# Patient Record
Sex: MALE | Race: BLACK OR AFRICAN AMERICAN | NOT HISPANIC OR LATINO | Employment: FULL TIME | URBAN - METROPOLITAN AREA
[De-identification: names, ages, dates, MRNs, and addresses within clinical notes are randomized per-mention and may not be internally consistent; named-entity substitution may affect disease eponyms.]

---

## 2017-01-31 DIAGNOSIS — M54.2 NECK PAIN: ICD-10-CM

## 2017-01-31 RX ORDER — METHYLPREDNISOLONE 4 MG/1
TABLET ORAL
Qty: 1 EACH | Refills: 0 | Status: SHIPPED | OUTPATIENT
Start: 2017-01-31 | End: 2017-02-10

## 2017-01-31 NOTE — TELEPHONE ENCOUNTER
Patient called, he said for the past 5 days he has been waking up with the right side of his neck swollen and right shoulder and arm aching.   He has been using ice which does help bring the swelling down.      He request a MDP through his pharmacy.  Can we refill this for him?  His last one was early December along with x rays that we ordered.  Also, should we order any new scans for him?

## 2017-02-10 ENCOUNTER — OFFICE VISIT (OUTPATIENT)
Dept: NEUROSURGERY | Facility: CLINIC | Age: 47
End: 2017-02-10

## 2017-02-10 VITALS
WEIGHT: 175 LBS | SYSTOLIC BLOOD PRESSURE: 101 MMHG | HEIGHT: 70 IN | BODY MASS INDEX: 25.05 KG/M2 | DIASTOLIC BLOOD PRESSURE: 65 MMHG | HEART RATE: 85 BPM

## 2017-02-10 DIAGNOSIS — M50.31 DEGENERATION OF INTERVERTEBRAL DISC OF HIGH CERVICAL REGION: ICD-10-CM

## 2017-02-10 DIAGNOSIS — M54.2 NECK PAIN: Primary | ICD-10-CM

## 2017-02-10 PROCEDURE — 99213 OFFICE O/P EST LOW 20 MIN: CPT | Performed by: NEUROLOGICAL SURGERY

## 2017-02-10 NOTE — PROGRESS NOTES
Subjective   Patient ID: Dave Flynn is a 47 y.o. male is here today for follow-up after ACDF C4-C7 on 2/9/16.      Over the past few months, the patient has had increased neck pain and has been treated with MDP x2.  X-rays of the cervical spine were also ordered which were negative per Renetta.    Today, the patient notes that he continues to have pain in his neck and right shoulder and arm.    He notes that MDP really helped with his pain symptoms when he was taking the medication but his pain symptoms eventually returned and have been getting worse.    Neck Pain    This is a chronic problem. The current episode started more than 1 month ago. The problem occurs daily. The problem has been gradually worsening. Pertinent negatives include no fever.       The following portions of the patient's history were reviewed and updated as appropriate: allergies, current medications, past family history, past medical history, past social history, past surgical history and problem list.    Review of Systems   Constitutional: Negative for fever.   Musculoskeletal: Positive for neck pain.   Neurological: Negative for syncope.   All other systems reviewed and are negative.    It is been about a year since I did a ACDF at C4-C5, C5-C6, C6-C7.  He's been having more pain in the right side of his neck.  The right arm really doesn't bother him I could once did.  He had some questions about his surgery which I answered.  He still has a little bit of mild difficulty with his voice.  He doesn't feel he can speak as forcefully as before.  I still think there is time for that to get better.  He doesn't have any swallowing problems anymore.  He works with Dr. Mercado.  Most of his problems of the right side of the neck.  He has a job which does involve a lot of moving of the head and looking up that contributes to it.  He is a .  Dr. Mercado had talked about doing facet injections which I think are good idea.  And possibly a  radiofrequency ablation.  He will work with Dr. Mercado.  I stressed with him that we are dealing with a chronic lifelong condition that is certainly treatable but not curable.  I think he understands that better.  We'll see him in 6 months to see how his voice is doing.  Objective   Physical Exam   Constitutional: He is oriented to person, place, and time. He appears well-developed and well-nourished.   HENT:   Head: Normocephalic and atraumatic.   Eyes: Conjunctivae and EOM are normal. Pupils are equal, round, and reactive to light.   Fundoscopic exam:       The right eye shows no papilledema. The right eye shows venous pulsations.        The left eye shows no papilledema. The left eye shows venous pulsations.   Neck: Carotid bruit is not present.   Neurological: He is oriented to person, place, and time. He has a normal Finger-Nose-Finger Test and a normal Heel to Shin Test. Gait normal.   Reflex Scores:       Tricep reflexes are 2+ on the right side and 2+ on the left side.       Bicep reflexes are 2+ on the right side and 2+ on the left side.       Brachioradialis reflexes are 2+ on the right side and 2+ on the left side.       Patellar reflexes are 2+ on the right side and 2+ on the left side.       Achilles reflexes are 2+ on the right side and 2+ on the left side.  Psychiatric: His speech is normal.     Neurologic Exam     Mental Status   Oriented to person, place, and time.   Registration of memory: Good recent and remote memory.   Attention: normal. Concentration: normal.   Speech: speech is normal   Level of consciousness: alert  Knowledge: consistent with education.     Cranial Nerves     CN II   Visual fields full to confrontation.   Visual acuity: normal    CN III, IV, VI   Pupils are equal, round, and reactive to light.  Extraocular motions are normal.     CN V   Facial sensation intact.   Right corneal reflex: normal  Left corneal reflex: normal    CN VII   Facial expression full, symmetric.   Right  facial weakness: none  Left facial weakness: none    CN VIII   Hearing: intact    CN IX, X   Palate: symmetric    CN XI   Right sternocleidomastoid strength: normal  Left sternocleidomastoid strength: normal    CN XII   Tongue: not atrophic  Tongue deviation: none    Motor Exam   Muscle bulk: normal  Right arm tone: normal  Left arm tone: normal  Right leg tone: normal  Left leg tone: normal    Strength   Strength 5/5 except as noted.     Sensory Exam   Light touch normal.     Gait, Coordination, and Reflexes     Gait  Gait: normal    Coordination   Finger to nose coordination: normal  Heel to shin coordination: normal    Reflexes   Right brachioradialis: 2+  Left brachioradialis: 2+  Right biceps: 2+  Left biceps: 2+  Right triceps: 2+  Left triceps: 2+  Right patellar: 2+  Left patellar: 2+  Right achilles: 2+  Left achilles: 2+  Right : 2+  Left : 2+      Assessment/Plan   Independent Review of Radiographic Studies:    Plain x-rays done 12 systems 7/16 show good placement of the cages the plates and screws for 5 to C5 6 to C6-C7.  I agree with the report.      Medical Decision Making:    He will continue working with Dr. Mercado.  Facet injections are reasonable idea and possibly an ablation afterwards.  He will take tizanidine as needed.  We'll see him in 6 months manily to see how the voice is doing.  He doesn't feel it is returning to normal.      Dave was seen today for neck pain.    Diagnoses and all orders for this visit:    Neck pain    Degeneration of intervertebral disc of high cervical region    Return in about 6 months (around 8/10/2017).

## 2017-02-10 NOTE — PATIENT INSTRUCTIONS
Steps to Quit Smoking   Smoking tobacco can be harmful to your health and can affect almost every organ in your body. Smoking puts you, and those around you, at risk for developing many serious chronic diseases. Quitting smoking is difficult, but it is one of the best things that you can do for your health. It is never too late to quit.  WHAT ARE THE BENEFITS OF QUITTING SMOKING?  When you quit smoking, you lower your risk of developing serious diseases and conditions, such as:  · Lung cancer or lung disease, such as COPD.  · Heart disease.  · Stroke.  · Heart attack.  · Infertility.  · Osteoporosis and bone fractures.  Additionally, symptoms such as coughing, wheezing, and shortness of breath may get better when you quit. You may also find that you get sick less often because your body is stronger at fighting off colds and infections. If you are pregnant, quitting smoking can help to reduce your chances of having a baby of low birth weight.  HOW DO I GET READY TO QUIT?  When you decide to quit smoking, create a plan to make sure that you are successful. Before you quit:  · Pick a date to quit. Set a date within the next two weeks to give you time to prepare.  · Write down the reasons why you are quitting. Keep this list in places where you will see it often, such as on your bathroom mirror or in your car or wallet.  · Identify the people, places, things, and activities that make you want to smoke (triggers) and avoid them. Make sure to take these actions:    Throw away all cigarettes at home, at work, and in your car.    Throw away smoking accessories, such as ashtrays and lighters.    Clean your car and make sure to empty the ashtray.    Clean your home, including curtains and carpets.  · Tell your family, friends, and coworkers that you are quitting. Support from your loved ones can make quitting easier.  · Talk with your health care provider about your options for quitting smoking.  · Find out what treatment  "options are covered by your health insurance.  WHAT STRATEGIES CAN I USE TO QUIT SMOKING?   Talk with your healthcare provider about different strategies to quit smoking. Some strategies include:  · Quitting smoking altogether instead of gradually lessening how much you smoke over a period of time. Research shows that quitting \"cold turkey\" is more successful than gradually quitting.  · Attending in-person counseling to help you build problem-solving skills. You are more likely to have success in quitting if you attend several counseling sessions. Even short sessions of 10 minutes can be effective.  · Finding resources and support systems that can help you to quit smoking and remain smoke-free after you quit. These resources are most helpful when you use them often. They can include:    Online chats with a counselor.    Telephone quitlines.    Printed self-help materials.    Support groups or group counseling.    Text messaging programs.    Mobile phone applications.  · Taking medicines to help you quit smoking. (If you are pregnant or breastfeeding, talk with your health care provider first.) Some medicines contain nicotine and some do not. Both types of medicines help with cravings, but the medicines that include nicotine help to relieve withdrawal symptoms. Your health care provider may recommend:    Nicotine patches, gum, or lozenges.    Nicotine inhalers or sprays.    Non-nicotine medicine that is taken by mouth.  Talk with your health care provider about combining strategies, such as taking medicines while you are also receiving in-person counseling. Using these two strategies together makes you more likely to succeed in quitting than if you used either strategy on its own.  If you are pregnant or breastfeeding, talk with your health care provider about finding counseling or other support strategies to quit smoking. Do not take medicine to help you quit smoking unless told to do so by your health care " provider.  WHAT THINGS CAN I DO TO MAKE IT EASIER TO QUIT?  Quitting smoking might feel overwhelming at first, but there is a lot that you can do to make it easier. Take these important actions:  · Reach out to your family and friends and ask that they support and encourage you during this time. Call telephone quitlines, reach out to support groups, or work with a counselor for support.  · Ask people who smoke to avoid smoking around you.  · Avoid places that trigger you to smoke, such as bars, parties, or smoke-break areas at work.  · Spend time around people who do not smoke.  · Lessen stress in your life, because stress can be a smoking trigger for some people. To lessen stress, try:    Exercising regularly.    Deep-breathing exercises.    Yoga.    Meditating.    Performing a body scan. This involves closing your eyes, scanning your body from head to toe, and noticing which parts of your body are particularly tense. Purposefully relax the muscles in those areas.  · Download or purchase mobile phone or tablet apps (applications) that can help you stick to your quit plan by providing reminders, tips, and encouragement. There are many free apps, such as QuitGuide from the CDC (Centers for Disease Control and Prevention). You can find other support for quitting smoking (smoking cessation) through smokefree.gov and other websites.  HOW WILL I FEEL WHEN I QUIT SMOKING?  Within the first 24 hours of quitting smoking, you may start to feel some withdrawal symptoms. These symptoms are usually most noticeable 2-3 days after quitting, but they usually do not last beyond 2-3 weeks. Changes or symptoms that you might experience include:  · Mood swings.  · Restlessness, anxiety, or irritation.  · Difficulty concentrating.  · Dizziness.  · Strong cravings for sugary foods in addition to nicotine.  · Mild weight gain.  · Constipation.  · Nausea.  · Coughing or a sore throat.  · Changes in how your medicines work in your  body.  · A depressed mood.  · Difficulty sleeping (insomnia).  After the first 2-3 weeks of quitting, you may start to notice more positive results, such as:  · Improved sense of smell and taste.  · Decreased coughing and sore throat.  · Slower heart rate.  · Lower blood pressure.  · Clearer skin.  · The ability to breathe more easily.  · Fewer sick days.  Quitting smoking is very challenging for most people. Do not get discouraged if you are not successful the first time. Some people need to make many attempts to quit before they achieve long-term success. Do your best to stick to your quit plan, and talk with your health care provider if you have any questions or concerns.     This information is not intended to replace advice given to you by your health care provider. Make sure you discuss any questions you have with your health care provider.     Document Released: 12/12/2002 Document Revised: 05/03/2016 Document Reviewed: 05/03/2016  [employer] Interactive Patient Education ©2016 [employer]  Obesity  Obesity is defined as having too much total body fat and a body mass index (BMI) of 30 or more. BMI is an estimate of body fat and is calculated from your height and weight. BMI is typically calculated by your health care provider during regular wellness visits. Obesity happens when you consume more calories than you can burn by exercising or performing daily physical tasks. Prolonged obesity can cause major illnesses or emergencies, such as:  · Stroke.  · Heart disease.  · Diabetes.  · Cancer.  · Arthritis.  · High blood pressure (hypertension).  · High cholesterol.  · Sleep apnea.  · Erectile dysfunction.  · Infertility problems.  CAUSES   · Regularly eating unhealthy foods.  · Physical inactivity.  · Certain disorders, such as an underactive thyroid (hypothyroidism), Cushing's syndrome, and polycystic ovarian syndrome.  · Certain medicines, such as steroids, some depression medicines, and  antipsychotics.  · Genetics.  · Lack of sleep.  DIAGNOSIS  A health care provider can diagnose obesity after calculating your BMI. Obesity will be diagnosed if your BMI is 30 or higher.  There are other methods of measuring obesity levels. Some other methods include measuring your skinfold thickness, your waist circumference, and comparing your hip circumference to your waist circumference.  TREATMENT   A healthy treatment program includes some or all of the following:  · Long-term dietary changes.  · Exercise and physical activity.  · Behavioral and lifestyle changes.  · Medicine only under the supervision of your health care provider. Medicines may help, but only if they are used with diet and exercise programs.  If your BMI is 40 or higher, your health care provider may recommend specialized surgery or programs to help with weight loss.  An unhealthy treatment program includes:  · Fasting.  · Fad diets.  · Supplements and drugs.  These choices do not succeed in long-term weight control.  HOME CARE INSTRUCTIONS  · Exercise and perform physical activity as directed by your health care provider. To increase physical activity, try the following:    Use stairs instead of elevators.    Park farther away from store entrances.    Garden, bike, or walk instead of watching television or using the computer.  · Eat healthy, low-calorie foods and drinks on a regular basis. Eat more fruits and vegetables. Use low-calorie cookbooks or take healthy cooking classes.  · Limit fast food, sweets, and processed snack foods.  · Eat smaller portions.  · Keep a daily journal of everything you eat. There are many free websites to help you with this. It may be helpful to measure your foods so you can determine if you are eating the correct portion sizes.  · Avoid drinking alcohol. Drink more water and drinks without calories.  · Take vitamins and supplements only as recommended by your health care provider.  · Weight-loss support groups,  registered dietitians, counselors, and stress reduction education can also be very helpful.  SEEK IMMEDIATE MEDICAL CARE IF:  · You have chest pain or tightness.  · You have trouble breathing or feel short of breath.  · You have weakness or leg numbness.  · You feel confused or have trouble talking.  · You have sudden changes in your vision.     This information is not intended to replace advice given to you by your health care provider. Make sure you discuss any questions you have with your health care provider.     Document Released: 01/25/2006 Document Revised: 01/08/2016 Document Reviewed: 01/23/2013  [employer] Interactive Patient Education ©2016 [employer]

## 2017-03-13 ENCOUNTER — TRANSCRIBE ORDERS (OUTPATIENT)
Dept: ADMINISTRATIVE | Facility: HOSPITAL | Age: 47
End: 2017-03-13

## 2017-03-13 DIAGNOSIS — R10.9 BILATERAL FLANK PAIN: Primary | ICD-10-CM

## 2017-08-14 ENCOUNTER — OFFICE VISIT (OUTPATIENT)
Dept: NEUROSURGERY | Facility: CLINIC | Age: 47
End: 2017-08-14

## 2017-08-14 VITALS
HEIGHT: 70 IN | DIASTOLIC BLOOD PRESSURE: 79 MMHG | HEART RATE: 78 BPM | BODY MASS INDEX: 22.19 KG/M2 | SYSTOLIC BLOOD PRESSURE: 128 MMHG | WEIGHT: 155 LBS

## 2017-08-14 DIAGNOSIS — M50.31 DEGENERATION OF INTERVERTEBRAL DISC OF HIGH CERVICAL REGION: ICD-10-CM

## 2017-08-14 DIAGNOSIS — M54.2 NECK PAIN: Primary | ICD-10-CM

## 2017-08-14 DIAGNOSIS — R49.0 HOARSENESS OF VOICE: ICD-10-CM

## 2017-08-14 PROCEDURE — 99213 OFFICE O/P EST LOW 20 MIN: CPT | Performed by: NEUROLOGICAL SURGERY

## 2017-08-14 NOTE — PATIENT INSTRUCTIONS
Steps to Quit Smoking   Smoking tobacco can be harmful to your health and can affect almost every organ in your body. Smoking puts you, and those around you, at risk for developing many serious chronic diseases. Quitting smoking is difficult, but it is one of the best things that you can do for your health. It is never too late to quit.  WHAT ARE THE BENEFITS OF QUITTING SMOKING?  When you quit smoking, you lower your risk of developing serious diseases and conditions, such as:  · Lung cancer or lung disease, such as COPD.  · Heart disease.  · Stroke.  · Heart attack.  · Infertility.  · Osteoporosis and bone fractures.  Additionally, symptoms such as coughing, wheezing, and shortness of breath may get better when you quit. You may also find that you get sick less often because your body is stronger at fighting off colds and infections. If you are pregnant, quitting smoking can help to reduce your chances of having a baby of low birth weight.  HOW DO I GET READY TO QUIT?  When you decide to quit smoking, create a plan to make sure that you are successful. Before you quit:  · Pick a date to quit. Set a date within the next two weeks to give you time to prepare.  · Write down the reasons why you are quitting. Keep this list in places where you will see it often, such as on your bathroom mirror or in your car or wallet.  · Identify the people, places, things, and activities that make you want to smoke (triggers) and avoid them. Make sure to take these actions:    Throw away all cigarettes at home, at work, and in your car.    Throw away smoking accessories, such as ashtrays and lighters.    Clean your car and make sure to empty the ashtray.    Clean your home, including curtains and carpets.  · Tell your family, friends, and coworkers that you are quitting. Support from your loved ones can make quitting easier.  · Talk with your health care provider about your options for quitting smoking.  · Find out what treatment  "options are covered by your health insurance.  WHAT STRATEGIES CAN I USE TO QUIT SMOKING?   Talk with your healthcare provider about different strategies to quit smoking. Some strategies include:  · Quitting smoking altogether instead of gradually lessening how much you smoke over a period of time. Research shows that quitting \"cold turkey\" is more successful than gradually quitting.  · Attending in-person counseling to help you build problem-solving skills. You are more likely to have success in quitting if you attend several counseling sessions. Even short sessions of 10 minutes can be effective.  · Finding resources and support systems that can help you to quit smoking and remain smoke-free after you quit. These resources are most helpful when you use them often. They can include:    Online chats with a counselor.    Telephone quitlines.    Printed self-help materials.    Support groups or group counseling.    Text messaging programs.    Mobile phone applications.  · Taking medicines to help you quit smoking. (If you are pregnant or breastfeeding, talk with your health care provider first.) Some medicines contain nicotine and some do not. Both types of medicines help with cravings, but the medicines that include nicotine help to relieve withdrawal symptoms. Your health care provider may recommend:    Nicotine patches, gum, or lozenges.    Nicotine inhalers or sprays.    Non-nicotine medicine that is taken by mouth.  Talk with your health care provider about combining strategies, such as taking medicines while you are also receiving in-person counseling. Using these two strategies together makes you more likely to succeed in quitting than if you used either strategy on its own.  If you are pregnant or breastfeeding, talk with your health care provider about finding counseling or other support strategies to quit smoking. Do not take medicine to help you quit smoking unless told to do so by your health care " provider.  WHAT THINGS CAN I DO TO MAKE IT EASIER TO QUIT?  Quitting smoking might feel overwhelming at first, but there is a lot that you can do to make it easier. Take these important actions:  · Reach out to your family and friends and ask that they support and encourage you during this time. Call telephone quitlines, reach out to support groups, or work with a counselor for support.  · Ask people who smoke to avoid smoking around you.  · Avoid places that trigger you to smoke, such as bars, parties, or smoke-break areas at work.  · Spend time around people who do not smoke.  · Lessen stress in your life, because stress can be a smoking trigger for some people. To lessen stress, try:    Exercising regularly.    Deep-breathing exercises.    Yoga.    Meditating.    Performing a body scan. This involves closing your eyes, scanning your body from head to toe, and noticing which parts of your body are particularly tense. Purposefully relax the muscles in those areas.  · Download or purchase mobile phone or tablet apps (applications) that can help you stick to your quit plan by providing reminders, tips, and encouragement. There are many free apps, such as QuitGuide from the CDC (Centers for Disease Control and Prevention). You can find other support for quitting smoking (smoking cessation) through smokefree.gov and other websites.  HOW WILL I FEEL WHEN I QUIT SMOKING?  Within the first 24 hours of quitting smoking, you may start to feel some withdrawal symptoms. These symptoms are usually most noticeable 2-3 days after quitting, but they usually do not last beyond 2-3 weeks. Changes or symptoms that you might experience include:  · Mood swings.  · Restlessness, anxiety, or irritation.  · Difficulty concentrating.  · Dizziness.  · Strong cravings for sugary foods in addition to nicotine.  · Mild weight gain.  · Constipation.  · Nausea.  · Coughing or a sore throat.  · Changes in how your medicines work in your  body.  · A depressed mood.  · Difficulty sleeping (insomnia).  After the first 2-3 weeks of quitting, you may start to notice more positive results, such as:  · Improved sense of smell and taste.  · Decreased coughing and sore throat.  · Slower heart rate.  · Lower blood pressure.  · Clearer skin.  · The ability to breathe more easily.  · Fewer sick days.  Quitting smoking is very challenging for most people. Do not get discouraged if you are not successful the first time. Some people need to make many attempts to quit before they achieve long-term success. Do your best to stick to your quit plan, and talk with your health care provider if you have any questions or concerns.     This information is not intended to replace advice given to you by your health care provider. Make sure you discuss any questions you have with your health care provider.     Document Released: 12/12/2002 Document Revised: 05/03/2016 Document Reviewed: 05/03/2016  [employer] Interactive Patient Education ©2017 [employer]

## 2017-08-14 NOTE — PROGRESS NOTES
Subjective   Patient ID: Dave Flynn is a 47 y.o. male is here today for follow-up of neck pain.    At the patient's last visit, he was encouraged to continue working with Dr. Mercado.  He was encouraged to continue his Tizanidine.    Today, the patient notes that he has been treated with BASSAM and RFA of his cervical spine, which seemed to help for 3-4 months.  He notes that his pain symptoms have begun to return.  He notes that he is not currently scheduled for a followup with Dr. Mercado.     He notes that his right hand fingers are numb and the left hand fingers tingle.  He notes that his symptoms have began to increase since last week.      Neck Pain    This is a chronic problem. The current episode started more than 1 month ago. The problem occurs daily. The problem has been unchanged. Pertinent negatives include no fever.       The following portions of the patient's history were reviewed and updated as appropriate: allergies, current medications, past family history, past medical history, past social history, past surgical history and problem list.    Review of Systems   Constitutional: Negative for fever.   Musculoskeletal: Positive for neck pain.   Neurological: Negative for syncope.   All other systems reviewed and are negative.    It's been about 18 months since I did an ACDF at C4-C5, C5-C6, and C6-C7.  The arms are generally doing well.  He still has persistent right-sided neck pain.  He got an ablation of the neck by Dr. Mercado and it helped.  But that was about 6 months ago.  The effects have started to wear off.  He did not want to return to see Dr. Mercado and asked me if he can be referred to another pain physician.  I told him I would do so.  He still also has hoarseness of voice.  He can swallow just fine.  The quality of his voice has changed he said.  I don't know if there is really a whole lot to do about that but we will send him to ENT for direct laryngoscopy and for further advice.  Again, I  don't know if there is much else to do.  His neck on the right side is the main source of his pain.  He continues to work as a  which is a physical job.  I stressed the importance of doing exercises and showed him how to do the chin tuck and the wall stretch.  He will do those.  We'll have him come back in a few months to re-group.      Objective   Physical Exam   Constitutional: He is oriented to person, place, and time. He appears well-developed and well-nourished.   HENT:   Head: Normocephalic and atraumatic.   Eyes: Conjunctivae and EOM are normal. Pupils are equal, round, and reactive to light.   Fundoscopic exam:       The right eye shows no papilledema. The right eye shows venous pulsations.        The left eye shows no papilledema. The left eye shows venous pulsations.   Neck: Carotid bruit is not present.   Neurological: He is oriented to person, place, and time. He has a normal Finger-Nose-Finger Test and a normal Heel to Shin Test. Gait normal.   Reflex Scores:       Tricep reflexes are 2+ on the right side and 2+ on the left side.       Bicep reflexes are 2+ on the right side and 2+ on the left side.       Brachioradialis reflexes are 2+ on the right side and 2+ on the left side.       Patellar reflexes are 2+ on the right side and 2+ on the left side.       Achilles reflexes are 2+ on the right side and 2+ on the left side.  Psychiatric: His speech is normal.     Neurologic Exam     Mental Status   Oriented to person, place, and time.   Registration of memory: Good recent and remote memory.   Attention: normal. Concentration: normal.   Speech: speech is normal   Level of consciousness: alert  Knowledge: consistent with education.     Cranial Nerves     CN II   Visual fields full to confrontation.   Visual acuity: normal    CN III, IV, VI   Pupils are equal, round, and reactive to light.  Extraocular motions are normal.     CN V   Facial sensation intact.   Right corneal reflex:  normal  Left corneal reflex: normal    CN VII   Facial expression full, symmetric.   Right facial weakness: none  Left facial weakness: none    CN VIII   Hearing: intact    CN IX, X   Palate: symmetric    CN XI   Right sternocleidomastoid strength: normal  Left sternocleidomastoid strength: normal    CN XII   Tongue: not atrophic  Tongue deviation: none    Motor Exam   Muscle bulk: normal  Right arm tone: normal  Left arm tone: normal  Right leg tone: normal  Left leg tone: normal    Strength   Strength 5/5 except as noted.     Sensory Exam   Light touch normal.     Gait, Coordination, and Reflexes     Gait  Gait: normal    Coordination   Finger to nose coordination: normal  Heel to shin coordination: normal    Reflexes   Right brachioradialis: 2+  Left brachioradialis: 2+  Right biceps: 2+  Left biceps: 2+  Right triceps: 2+  Left triceps: 2+  Right patellar: 2+  Left patellar: 2+  Right achilles: 2+  Left achilles: 2+  Right : 2+  Left : 2+      Assessment/Plan   Independent Review of Radiographic Studies:    I reviewed plain x-rays done 12/17/16 show good position of the cages and the plate set C4-C5 C5-C6 and C6 7.      Medical Decision Making:    We will refer him to a new pain group for possible repeat ablation of the neck.  We'll also send him to the ENT doctors for evaluation of his vocal cords.  I don't think there is really much to do other than to wait it out but he doesn't feel his voice has improved well over a year from surgery.      Dave was seen today for neck pain.    Diagnoses and all orders for this visit:    Neck pain  -     Ambulatory Referral to Pain Management    Hoarseness of voice  -     Ambulatory Referral to ENT (Otolaryngology)    Degeneration of intervertebral disc of high cervical region  -     Ambulatory Referral to Pain Management    Return in about 2 months (around 10/14/2017).

## 2017-08-24 ENCOUNTER — OFFICE VISIT (OUTPATIENT)
Dept: PAIN MEDICINE | Facility: CLINIC | Age: 47
End: 2017-08-24

## 2017-08-24 VITALS
WEIGHT: 171 LBS | TEMPERATURE: 98.5 F | SYSTOLIC BLOOD PRESSURE: 124 MMHG | HEART RATE: 84 BPM | DIASTOLIC BLOOD PRESSURE: 83 MMHG | OXYGEN SATURATION: 98 % | RESPIRATION RATE: 16 BRPM | HEIGHT: 70 IN | BODY MASS INDEX: 24.48 KG/M2

## 2017-08-24 DIAGNOSIS — M47.812 FACET ARTHROPATHY, CERVICAL: ICD-10-CM

## 2017-08-24 DIAGNOSIS — G89.29 OTHER CHRONIC PAIN: Primary | ICD-10-CM

## 2017-08-24 DIAGNOSIS — M50.30 DEGENERATIVE CERVICAL DISC: ICD-10-CM

## 2017-08-24 LAB
POC AMPHETAMINES: NEGATIVE
POC BARBITURATES: NEGATIVE
POC BENZODIAZEPHINES: NEGATIVE
POC COCAINE: NEGATIVE
POC METHADONE: NEGATIVE
POC METHAMPHETAMINE SCREEN URINE: NEGATIVE
POC OPIATES: NEGATIVE
POC OXYCODONE: NEGATIVE
POC PHENCYCLIDINE: NEGATIVE
POC PROPOXYPHENE: NEGATIVE
POC THC: NEGATIVE
POC TRICYCLIC ANTIDEPRESSANTS: NEGATIVE

## 2017-08-24 PROCEDURE — 99204 OFFICE O/P NEW MOD 45 MIN: CPT | Performed by: NURSE PRACTITIONER

## 2017-08-24 PROCEDURE — 80305 DRUG TEST PRSMV DIR OPT OBS: CPT | Performed by: NURSE PRACTITIONER

## 2017-08-24 NOTE — PROGRESS NOTES
"CHIEF COMPLAINT  Pt is referred here per Dr Martinez for neck pain,having had a cervical fusion per Juan about 18 months ago. Pt does report moderate relief,from a \"pressure\" pain with head pain to an aching/throbbing pain now.  Pt has had 1 series of  Cervical facets and a Cervical RF in 2/2017 with significant relief for 6 months.  Pt states did not feel \"safe\"in a \"non hospital environment\" after RF,having had elevated blood pressure post RF on day of procedure.  Hx of PT post fusion,now doing at home exercises.  Hx of hydrocodone for about 1 year per Dr Mercado until 1/2017.    Subjective   Dave Flynn is a 47 y.o. male.   He presents to the office for evaluation of neck pain. He was referred here by Dr. Fred Martinez.     Patient reports 4-5 year history of neck pain.  Initially he went to his PCP and was referred to Dr. Martinez.  He was sent to physical therapy and also a series of cervical BASSAM's without improvement in his pain.  Ultimately he underwent a 3 level ACDF C4-C7 by Dr. Martinez 2/9/16.  Following surgery he reports that the pain is different, the Following surgery he worked with Dr. Mercado and completed both cervical BASSAM's as well as cervical MBB and facet block. He reports 6 months of significant benefit from cervical MBB but the benefit has started to wane.  He has not been back to Dr. Mercado since his procedure over 6 months ago and does not wish to return as he was not happy with the office environment. He has been referred to our office to repeat cervical radiofrequency ablation.      He continues to report neck pain, right side is worse than left side.  He also reports tingling a bilateral fingers again the right side is worse than the left, symptom has been present since prior to surgery.  He also reports aching and headaches in the occipital region.  Pain is exacerbated by his job.  He works as a  and does perform a physical job.  Specific triggers include " looking up and stretching his neck.  Pain is improved some with rest.  He was previously prescribed hydrocodone by Dr. Mercado when she reports did seem to help dull the pain but only provided temporary relief.  He takes over-the-counter naproxen and/or ibuprofen and has previously refused stronger NSAIDs due to fear what he read online about cardiovascular risks.  He was prescribed Neurontin prior to surgery which did seem to help some with nerve pain.  Overall he does not seem to be interested in medication management.    Patient works full-time as a .  Patient is a previous smoker, he quit about a year ago.  Drinks alcohol only socially.  Denies use of marijuana and other illicit drugs.    Neck Pain    This is a chronic problem. The current episode started more than 1 year ago. The problem occurs constantly. The pain is associated with nothing. The pain is present in the left side, right side, midline and occipital region. The quality of the pain is described as aching and burning. The pain is at a severity of 3/10. The symptoms are aggravated by position and twisting. Associated symptoms include headaches and numbness (bilat. hands/fingers). Pertinent negatives include no chest pain. He has tried NSAIDs, oral narcotics, neck support, muscle relaxants, ice and heat for the symptoms. The treatment provided mild relief.        Current Outpatient Prescriptions:   •  atenolol (TENORMIN) 100 MG tablet, Take by mouth., Disp: , Rfl:   •  atenolol-chlorthalidone (TENORETIC) 50-25 MG per tablet, Take by mouth., Disp: , Rfl:   •  atorvastatin (LIPITOR) 20 MG tablet, Take by mouth., Disp: , Rfl:   •  sildenafil (VIAGRA) 100 MG tablet, Take by mouth., Disp: , Rfl:   •  tiZANidine (ZANAFLEX) 4 MG tablet, Take 4 mg by mouth at night as needed for muscle spasms., Disp: , Rfl:   •  zolpidem (AMBIEN) 10 MG tablet, Take by mouth., Disp: , Rfl:   •  HYDROcodone-acetaminophen (NORCO)  MG per tablet, , Disp: ,  Rfl:   •  ibuprofen (ADVIL,MOTRIN) 800 MG tablet, Take 1 tablet by mouth every 8 (eight) hours as needed for mild pain (1-3)., Disp: 90 tablet, Rfl: 0    The following portions of the patient's history were reviewed and updated as appropriate: allergies, current medications, past family history, past medical history, past social history, past surgical history and problem list.    REVIEW OF PERTINENT MEDICAL DATA    The patient referral received from Dr. Fred Martinez for neck pain and cervical disc degeneration.  Referral for possible repeat for radio frequency ablation of the neck.    Reviewed the office visit note from 8/14/2017 with Dr. Fred Martinez  Patient following up for neck pain.  Has been treated with cervical epidurals as well as RFA in the past which seemed to help 3-4 months.  He is noting his maintenance returning today.  Previously was seen by Dr. Mercado.  Patient actually 18 months out from anterior cervical discectomy and fusion at C4-C5, C5-C6, and C6-C7 arms are generally doing well but continues to have persistent right-sided neck pain.  Patient reported a cervical radiofrequency ablation with Dr. Rogelio Butler 6 months ago and Effexor started to wear off.  Patient is not wanting to return to Dr. Mercado and wants to be referred elsewhere.    Beck Depression Inventory is 4      SINAN          X-ray of the cervical spine 12/7/2016 showed cervical fusion hardware unchanged in alignment is normal.    The myelogram cervical spine from 5/15/2015 report  Multilevel cervical degenerative disc disease.    Review of Systems   Constitutional: Positive for activity change (decreased). Negative for appetite change.   Respiratory: Negative for apnea and chest tightness.    Cardiovascular: Negative for chest pain.   Gastrointestinal: Negative for constipation, diarrhea and nausea.   Musculoskeletal: Positive for neck pain.   Neurological: Positive for numbness (bilat. hands/fingers) and headaches.  "Negative for dizziness and light-headedness.   Psychiatric/Behavioral: Positive for sleep disturbance. Negative for suicidal ideas.     Vitals:    08/24/17 1258   BP: 124/83   Pulse: 84   Resp: 16   Temp: 98.5 °F (36.9 °C)   SpO2: 98%   Weight: 171 lb (77.6 kg)   Height: 69.5\" (176.5 cm)   PainSc: 3  Comment: neck pain ranges from 3-8/10   PainLoc: Neck       Objective   Physical Exam   Constitutional: He is oriented to person, place, and time. He appears well-developed and well-nourished. He is cooperative. No distress.   HENT:   Head: Normocephalic and atraumatic.   Right Ear: External ear normal.   Left Ear: External ear normal.   Nose: Nose normal.   Eyes: Conjunctivae and lids are normal. Pupils are equal, round, and reactive to light.   Neck: Trachea normal and normal range of motion. Neck supple.   Cardiovascular: Normal rate, regular rhythm and normal heart sounds.    Pulmonary/Chest: Effort normal and breath sounds normal. No respiratory distress.   Abdominal: Soft. Normal appearance.   Musculoskeletal: He exhibits no deformity.        Cervical back: He exhibits decreased range of motion, tenderness, bony tenderness, pain and spasm.   Occipital area tenderness  +cervical facet tenderness/loading   Neurological: He is alert and oriented to person, place, and time. He has normal strength and normal reflexes. No cranial nerve deficit. Coordination and gait normal.   Reflex Scores:       Tricep reflexes are 2+ on the right side and 2+ on the left side.       Bicep reflexes are 2+ on the right side and 2+ on the left side.       Brachioradialis reflexes are 2+ on the right side and 2+ on the left side.  Skin: Skin is warm, dry and intact. He is not diaphoretic.   Psychiatric: He has a normal mood and affect. His speech is normal and behavior is normal. Judgment and thought content normal. Cognition and memory are normal.   Nursing note and vitals reviewed.      Assessment/Plan   Dave was seen today for neck " "pain.    Diagnoses and all orders for this visit:    Other chronic pain    Degenerative cervical disc    Facet arthropathy, cervical  -     Case Request      --- Bilateral C2-C5 MBB X 1. Goal will be to repeat cervical RFL if diagnostically positive.  No blood thinners.    -------  Education about Medial Branch Blockade and RF Therapy:    This medial branch blockade (MBB) suggested is intended for diagnostic purposes, with the intent of offering the patient Radiofrequency thermal rhizotomy (RF) if the MBB is diagnostically effective.  The diagnostic blockade is necessary to determine the likelihood that RF therapy could be efficacious in providing long term relief to the patient.    Medial branches are sensory nerve branches that connect to a facet joint and transmit sensations & pain signals from that joint.  Facet is a term for the type of joints found in the spine.  Medial branches are the nerves that go to a facet, and therefore are also sometimes called \"facet joint nerves\" (FJNs).      In a medial branch blockade procedure, xray fluoroscopy is used to verify the locations of the outside of the joint lines which are being targeted.  Under xray guidance, needles are placed to these areas.  Contrast dye is injected to confirm proper placement, with dye flowing over the joint area, and to ensure that the dye does not flow into unintended areas such as a vein.  When this is confirmed, local anesthetic is injected to block the medial branch at that joint level.      If MBBs are diagnostically successful in blocking pain, then the patient is most likely a great candidate for Radiofrequency of those facet joint nerves.  In the RF procedure, needles are placed to the joint lines in the same fashion, and after testing, the needle tips are heated to thermally treat the nerves, blocking the nerves by in essence damaging the nerves with the heat treatment.       Medically, a successful RF procedure should provide a patient " with 50% pain relief or more for at least 6 months.  Clinical experience suggests that successful patients receive relief more in the range of 12 months on average.  We also discussed that a fortunate minority of patients receive therapeutic success from the MBB, and may not require RF ablation.  If a patient receives more than 8 weeks of relief from MBB, then occasional repeat MBB for therapeutic purposes is a very reasonable alternative therapy.  This course of therapy is consistent with our LCDs according to our CMS  in the area, and therefore other insurance providers should follow accordingly.  We will monitor our patients to screen for these therapeutic responders and will offer RF therapy only when necessary.      We discussed that MBB & RF are not without risks.  Guidelines regarding anticoagulant use & neuraxial procedures will be respected.  Patients that are ill or otherwise may be at risk for sepsis will not have their spines accessed by neuraxial injections of any type.  This patient will not be offered these therapies if there is an increased risk.   We discussed that there is a risk of postprocedural pain and also a risk of worsening of clinical picture with these procedures as with any neuraxial procedure.    -------    --- Routine UDS in office today as part of new patient evaluation.  The specimen was viewed and the immunoassay result reviewed and is negative.  This specimen will be sent to [facility] for confirmation.     --- Could consider stronger NSAID or re-trial of Gabapentin in the future   --- Follow-up after procedure         SINAN REPORT  SINAN report has been reviewed and scanned into the patient's chart.  Date of last SINAN : 8/23/2017

## 2017-09-07 ENCOUNTER — DOCUMENTATION (OUTPATIENT)
Dept: PAIN MEDICINE | Facility: CLINIC | Age: 47
End: 2017-09-07

## 2017-09-07 ENCOUNTER — OUTSIDE FACILITY SERVICE (OUTPATIENT)
Dept: PAIN MEDICINE | Facility: CLINIC | Age: 47
End: 2017-09-07

## 2017-09-07 PROCEDURE — 64491 INJ PARAVERT F JNT C/T 2 LEV: CPT | Performed by: ANESTHESIOLOGY

## 2017-09-07 PROCEDURE — 64490 INJ PARAVERT F JNT C/T 1 LEV: CPT | Performed by: ANESTHESIOLOGY

## 2017-09-07 NOTE — PROGRESS NOTES
Bilateral C2-4 Cervical Medial Branch Blockade  [facility]      PREOPERATIVE DIAGNOSIS:  Cervical spondylosis without myelopathy   POSTOPERATIVE DIAGNOSIS:  Same as preoperative diagnosis    PROCEDURE:    Cervical Facet Nerve (medial branch) Blocks, with Fluoroscopy:  LEVELS C2, C3, C4 to block facet joints C2-3, C3-4 bilaterally  • [id number]  - Bilateral Cervical Facet blocks, 1st level  • 41492-08  - Bilateral Cervical Facet blocks, 2nd level    PRE-PROCEDURE DISCUSSION WITH PATIENT:    Risks and complications were discussed with the patient prior to starting the procedure and informed consent was obtained.    SURGEON:  Toby Barrientos MD    REASON FOR PROCEDURE:    The patient complains of pain that seems to have a significant axial component Previous clinically significant therapeutic effect after prior Radiofrequency procedure Tenderness of the affected facet joints on exam under fluoroscopy Headaches are noted which seem to have a cervicogenic component    SEDATION:  Versed 8mg and Fentanyl 200 mcg IV  ANESTHETIC:   Marcaine 0.5%  STEROID:  Dexamethasone 6mg  TOTAL VOLUME OF SOLUTION:  6mL    DESCRIPTON OF PROCEDURE:  After obtaining informed consent, the patient was placed in the prone position. IV access was obtained.  EKG, blood pressure, and pulse oximeter were monitored and all sedation was administered by an RN under my direct guidance.  The cervical area was prepped with Chloraprep and draped with sterile barrier. Under fluoroscopic guidance the waists of the C2 through C4 vertebra on each side were identified. Skin and subcutaneous tissue was then anesthetized with 1% lidocaine 1mL at each point. Then spinal needles were introduced under fluoroscopic guidance at the waist of these vertebra on one side. After confirming the position of the needle under fluoroscopic PA and lateral views, and confirming negative aspiration of blood and CSF, 0.25 mL of Omnipaque was injected.  Proper  spread and lack of vascular uptake was demonstrated.  A solution was prepared as above, and 1 mL of that solution was injected very slowly each level.   In similar fashion, this procedure was repeated at C2, C3, and C4 levels on the contralateral side.  Needles were removed intact from all levels.  Vitals were stable throughout.     ESTIMATED BLOOD LOSS:  minimal  SPECIMENS:  None    COMPLICATIONS:    No complications were noted., There was no indication of vascular uptake on live injection of contrast dye. and There was no indication of intrathecal uptake on live injection of contrast dye.    TOLERANCE & DISCHARGE CONDITION:    The patient tolerated the procedure well.  The patient was transported to the recovery area without difficulties.  The patient was discharged to home under the care of family in stable and satisfactory condition.  The patient did notice improvement in pain on extension and rotation of the cervical spine.    PLAN OF CARE:  1. The patient was given our standard instruction sheet.  2. We discussed that Cervical Medial Branch Blockade is a diagnostic procedure in consideration for radiofrequency ablation if two diagnostic procedures proved to be positive for significant benefit.  If sustained relief of six to eight weeks is obtained, then an alternative plan could be therapeutic cervical medial branch blocks on an as-needed basis.  3. The patient is asked to keep a pain log hourly for 8 hours postoperatively today.  4. The patient will Return to clinic 1 wk.  5. The patient will resume all medications as per the medication reconciliation sheet.

## 2017-09-21 ENCOUNTER — OFFICE VISIT (OUTPATIENT)
Dept: PAIN MEDICINE | Facility: CLINIC | Age: 47
End: 2017-09-21

## 2017-09-21 VITALS
HEIGHT: 70 IN | SYSTOLIC BLOOD PRESSURE: 119 MMHG | HEART RATE: 90 BPM | OXYGEN SATURATION: 95 % | TEMPERATURE: 98.2 F | WEIGHT: 176.8 LBS | BODY MASS INDEX: 25.31 KG/M2 | RESPIRATION RATE: 16 BRPM | DIASTOLIC BLOOD PRESSURE: 75 MMHG

## 2017-09-21 DIAGNOSIS — M50.30 DEGENERATIVE CERVICAL DISC: ICD-10-CM

## 2017-09-21 DIAGNOSIS — M50.31 DEGENERATION OF INTERVERTEBRAL DISC OF HIGH CERVICAL REGION: ICD-10-CM

## 2017-09-21 DIAGNOSIS — M47.812 FACET ARTHROPATHY, CERVICAL: ICD-10-CM

## 2017-09-21 DIAGNOSIS — G89.29 OTHER CHRONIC PAIN: Primary | ICD-10-CM

## 2017-09-21 DIAGNOSIS — G56.03 BILATERAL CARPAL TUNNEL SYNDROME: ICD-10-CM

## 2017-09-21 PROCEDURE — 99213 OFFICE O/P EST LOW 20 MIN: CPT | Performed by: NURSE PRACTITIONER

## 2017-09-21 RX ORDER — ZOLPIDEM TARTRATE 12.5 MG/1
TABLET, FILM COATED, EXTENDED RELEASE ORAL
COMMUNITY
Start: 2017-08-31 | End: 2018-05-04

## 2017-09-21 RX ORDER — BROMPHENIRAMINE MALEATE, PSEUDOEPHEDRINE HYDROCHLORIDE, AND DEXTROMETHORPHAN HYDROBROMIDE 2; 30; 10 MG/5ML; MG/5ML; MG/5ML
SYRUP ORAL
COMMUNITY
Start: 2017-09-18 | End: 2017-10-16

## 2017-09-21 NOTE — PROGRESS NOTES
CHIEF COMPLAINT    F/U neck pain. Post CMBB, pt states he had one week of significant relief. It has since come back a little but not to where the pain once was. The numbness in his hands is getting worse and radiating up his forearms.     Subjective   Dave Flynn is a 47 y.o. male  who presents to the office for follow-up of procedure.  He completed a Bilateral CMBB   on  9-07-17 performed by Dr. Barrientos for management of neck pain. Patient reports 80% relief from the procedure for one week. He has had previous positive response with cervical RFL.      Patient also reporting bilateral carpal tunnel symptoms. Left side much worse than right. Has history of release surgery in 2008 with a hand surgeon in [place].  He is requesting to establish care with someone locally.      Neck Pain    This is a chronic problem. The current episode started more than 1 year ago. The problem occurs constantly. The pain is associated with nothing. The pain is present in the left side, right side, midline and occipital region. The quality of the pain is described as aching and burning. The pain is at a severity of 2/10. The symptoms are aggravated by position and twisting. Associated symptoms include headaches and numbness (bilat. hands/fingers). Pertinent negatives include no chest pain or fever. He has tried NSAIDs, oral narcotics, neck support, muscle relaxants, ice and heat (cervical MBB--temporary significant relief) for the symptoms. The treatment provided mild relief.      PEG Assessment   What number best describes your pain on average in the past week?4  What number best describes how, during the past week, pain has interfered with your enjoyment of life?3  What number best describes how, during the past week, pain has interfered with your general activity?  3    The following portions of the patient's history were reviewed and updated as appropriate: allergies, current medications, past family history, past medical history,  "past social history, past surgical history and problem list.    Review of Systems   Constitutional: Negative for activity change, appetite change, chills, fatigue and fever.   Respiratory: Negative for apnea, chest tightness and shortness of breath.    Cardiovascular: Negative for chest pain.   Gastrointestinal: Negative for constipation, diarrhea and nausea.   Genitourinary: Negative for difficulty urinating.   Musculoskeletal: Positive for neck pain.   Neurological: Positive for numbness (bilat. hands/fingers) and headaches. Negative for dizziness and light-headedness.   Psychiatric/Behavioral: Positive for sleep disturbance (occ). Negative for agitation, confusion, hallucinations and suicidal ideas. The patient is not nervous/anxious.        Vitals:    09/21/17 1435   BP: 119/75   Pulse: 90   Resp: 16   Temp: 98.2 °F (36.8 °C)   SpO2: 95%   Weight: 176 lb 12.8 oz (80.2 kg)   Height: 69.5\" (176.5 cm)   PainSc:   2   PainLoc: Neck     Objective   Physical Exam   Constitutional: He is oriented to person, place, and time. He appears well-developed and well-nourished.   HENT:   Head: Normocephalic.   Eyes: Conjunctivae are normal.   Cardiovascular: Normal rate.    Pulmonary/Chest: Effort normal. No respiratory distress.   Musculoskeletal:        Cervical back: He exhibits decreased range of motion, tenderness and pain.   +tenderness cervical facets/+cervical facet loading, +bilateral occipital tenderness    Neurological: He is alert and oriented to person, place, and time. He has normal strength. Gait normal.   Skin: Skin is warm and dry.   Psychiatric: He has a normal mood and affect. His behavior is normal.   Nursing note and vitals reviewed.      Assessment/Plan   Dave was seen today for neck pain.    Diagnoses and all orders for this visit:    Other chronic pain    Facet arthropathy, cervical  -     Case Request    Degenerative cervical disc    Degeneration of intervertebral disc of high cervical " region    Bilateral carpal tunnel syndrome  -     Ambulatory Referral to Hand Surgery      --- Bilateral C2-C4 RFL   --------  Education about Radiofrequency Lesioning of Medial Branches:    The medial branch blockade was intended for diagnostic purposes, with the intent of offering the patient Radiofrequency thermal rhizotomy if the MBB is diagnostically effective.  The diagnostic blockade is necessary to determine the likelihood that RF therapy could be efficacious in providing long term relief to the patient.  As indicated above, diagnostic efficacy was established.      In the RF procedure, needles are placed to the joint lines in the same fashion, and after testing, the needle tips are heated to thermally treat the nerves, blocking the nerves by in essence damaging the nerves with the heat treatment.      Medically, a successful RF procedure should provide a patient with 50% pain relief or more for at least 6 months.  We estimate a likelihood of about an 80% chance that medical success will be realized.  We discussed & educated once again that not all patients have a medically successful result, and the patient voices understanding.  However, our clinical experience suggests that successful patients receive relief more in the range of 12 months on average.  (We also discussed that a fortunate minority of patients receive therapeutic success from the MBB, and may not require RF ablation.  If a patient receives more than 8 weeks of relief from MBB, then occasional repeat MBB for therapeutic purposes is a very reasonable alternative therapy.  This course of therapy is consistent with our LCDs according to our CMS  in the area, and therefore other insurance providers should follow accordingly.  We will monitor our patients to screen for these therapeutic responders and will offer RF therapy only when necessary.  However, in this clinical scenario, this therapeutic result was not realized, and therefore  Radiofrequency Lesioning is medically necessary.)      We discussed that MBB & RF are not without risks.  Guidelines regarding anticoagulant use & neuraxial procedures will be respected.  Patients that are ill or otherwise may be at risk for sepsis will not have their spines accessed by neuraxial injections of any type.  This patient will not be offered these therapies if there is an increased risk.   We discussed that there is a risk of postprocedural pain and also a risk of worsening of clinical picture with these procedures as with any neuraxial procedure.  All invasive procedures have risks including but not limited to bleeding, infection, injury, nerve injury, paralysis, coma, death, lack of pain relief, and worsening of clinical picture.      In this education session, all of these topics were covered and the patient voiced understanding.    ---------    --- Follow-up after procedure         SINAN REPORT    As part of the patient's treatment plan, I am prescribing controlled substances. The patient has been made aware of appropriate use of such medications, including potential risk of somnolence, limited ability to drive and/or work safely, and the potential for dependence or overdose. It has also bee made clear that these medications are for use by this patient only, without concomitant use of alcohol or other substances unless prescribed.     Patient has completed prescribing agreement detailing terms of continued prescribing of controlled substances, including monitoring SINAN reports, urine drug screening, and pill counts if necessary. The patient is aware that inappropriate use will results in cessation of prescribing such medications.    SINAN report has been reviewed and scanned into the patient's chart.    Date of last SINAN : 9-19-17    History and physical exam exhibit continued safe and appropriate use of controlled substances.       EMR Dragon/Transcription disclaimer:   Much of this encounter  note is an electronic transcription/translation of spoken language to printed text. The electronic translation of spoken language may permit erroneous, or at times, nonsensical words or phrases to be inadvertently transcribed; Although I have reviewed the note for such errors, some may still exist.

## 2017-10-16 ENCOUNTER — OFFICE VISIT (OUTPATIENT)
Dept: ORTHOPEDIC SURGERY | Facility: CLINIC | Age: 47
End: 2017-10-16

## 2017-10-16 VITALS — BODY MASS INDEX: 25.34 KG/M2 | TEMPERATURE: 99.4 F | WEIGHT: 177 LBS | HEIGHT: 70 IN

## 2017-10-16 DIAGNOSIS — M79.641 BILATERAL HAND PAIN: Primary | ICD-10-CM

## 2017-10-16 DIAGNOSIS — M79.642 BILATERAL HAND PAIN: Primary | ICD-10-CM

## 2017-10-16 DIAGNOSIS — G56.03 BILATERAL CARPAL TUNNEL SYNDROME: ICD-10-CM

## 2017-10-16 PROCEDURE — 20526 THER INJECTION CARP TUNNEL: CPT | Performed by: ORTHOPAEDIC SURGERY

## 2017-10-16 PROCEDURE — 73130 X-RAY EXAM OF HAND: CPT | Performed by: ORTHOPAEDIC SURGERY

## 2017-10-16 PROCEDURE — 99203 OFFICE O/P NEW LOW 30 MIN: CPT | Performed by: ORTHOPAEDIC SURGERY

## 2017-10-16 RX ORDER — VARENICLINE TARTRATE 1 MG/1
TABLET, FILM COATED ORAL
Refills: 5 | COMMUNITY
Start: 2017-09-22 | End: 2018-03-09

## 2017-10-16 RX ADMIN — METHYLPREDNISOLONE ACETATE 80 MG: 80 INJECTION, SUSPENSION INTRA-ARTICULAR; INTRALESIONAL; INTRAMUSCULAR; SOFT TISSUE at 10:27

## 2017-10-16 RX ADMIN — LIDOCAINE HYDROCHLORIDE 1 ML: 10 INJECTION, SOLUTION INFILTRATION; PERINEURAL at 10:27

## 2017-10-16 RX ADMIN — BUPIVACAINE HYDROCHLORIDE 1 ML: 5 INJECTION, SOLUTION PERINEURAL at 10:27

## 2017-10-17 RX ORDER — BUPIVACAINE HYDROCHLORIDE 5 MG/ML
1 INJECTION, SOLUTION PERINEURAL
Status: COMPLETED | OUTPATIENT
Start: 2017-10-16 | End: 2017-10-16

## 2017-10-17 RX ORDER — LIDOCAINE HYDROCHLORIDE 10 MG/ML
1 INJECTION, SOLUTION INFILTRATION; PERINEURAL
Status: COMPLETED | OUTPATIENT
Start: 2017-10-16 | End: 2017-10-16

## 2017-10-17 RX ORDER — METHYLPREDNISOLONE ACETATE 80 MG/ML
80 INJECTION, SUSPENSION INTRA-ARTICULAR; INTRALESIONAL; INTRAMUSCULAR; SOFT TISSUE
Status: COMPLETED | OUTPATIENT
Start: 2017-10-16 | End: 2017-10-16

## 2017-10-17 NOTE — PROGRESS NOTES
Patient: Dave Flynn    YOB: 1970    Medical Record Number: [id number]    Chief Complaints:  Bilateral hand pain and numbness    History of Present Illness:     47 y.o. male patient who presents for evaluation of both hands.  He has a several month history of intermittent numbness and tingling in his thumb, index, and middle fingers bilaterally.  He tells me the left is worse than the right.  He denies any precipitating event or factor.  He describes his pain as moderate, intermittent, and burning.  The symptoms tend to be worse at night, in the morning, and while driving.  He has not noticed any alleviating factors.  Of note, he has a history of right carpal tunnel syndrome for which he underwent surgery a number of years ago in [place].  The surgery was successful for him until recently.  He tells me his current symptoms are similar to those that he was having prior to his surgery.    Allergies: No Known Allergies    Home Medications:      Current Outpatient Prescriptions:   •  atenolol-chlorthalidone (TENORETIC) 50-25 MG per tablet, Take by mouth., Disp: , Rfl:   •  atorvastatin (LIPITOR) 20 MG tablet, Take by mouth., Disp: , Rfl:   •  CHANTIX CONTINUING MONTH RICARDA 1 MG tablet, TK 1 T PO  BID, Disp: , Rfl: 5  •  zolpidem CR (AMBIEN CR) 12.5 MG CR tablet, , Disp: , Rfl:     Past Medical History:   Diagnosis Date   • Hypercholesterolemia    • Hypertension    • Migraine    • Neck pain    • Peripheral neuropathy        Past Surgical History:   Procedure Laterality Date   • HAND SURGERY Right 05/2008   • NECK SURGERY  02/09/2016    [facility]- C4-C5,C5-C6,C6-C7-Dr. Martinez        Social History     Occupational History   • Not on file.     Social History Main Topics   • Smoking status: Former Smoker     Packs/day: 0.50     Quit date: 12/2016   • Smokeless tobacco: Never Used   • Alcohol use Yes      Comment: social   • Drug use: No   • Sexual activity: Not on file      Social History     Social History  "Narrative       Family History   Problem Relation Age of Onset   • No Known Problems Other        Review of Systems:      Constitutional: Denies fever, shaking or chills   Eyes: Denies change in visual acuity   HEENT: Denies nasal congestion or sore throat   Respiratory: Denies cough or shortness of breath   Cardiovascular: Denies chest pain or edema  Endocrine: Denies tremors, palpitations, intolerance of heat or cold, polyuria, polydipsia.  GI: Denies abdominal pain, nausea, vomiting, bloody stools or diarrhea  : Denies frequency, urgency, incontinence, retention, or nocturia.  Musculoskeletal: Denies numbness tingling or loss of motor function except as above  Integument: Denies rash, lesion or ulceration   Neurologic: Denies headache or focal weakness, deficits  Heme: Denies epistaxis, spontaneous or excessive bleeding, epistaxis, hematuria, melena, fatigue, enlarged or tender lymph nodes.      All other pertinent positives and negatives as noted above in HPI.    Physical Exam: 47 y.o. male  Vitals:    10/16/17 1525   Temp: 99.4 °F (37.4 °C)   TempSrc: Temporal Artery    Weight: 177 lb (80.3 kg)   Height: 69.5\" (176.5 cm)       General:  Patient is awake and alert.  Appears in no acute distress or discomfort.    Psych:  Affect and demeanor are appropriate.    Eyes:  Conjunctiva and sclera appear grossly normal.  Eyes track well and EOM seem to be intact.    Ears:  No gross abnormalities.  Hearing adequate for the exam.    Cardiovascular:  Regular rate and rhythm.    Lungs:  Good chest expansion.  Breathing unlabored.    Lymph:  No palpable masses or adenopathy in Either upper extremity    Left upper extremity:   Skin is benign.  No obvious swellings, masses or atrophy.  No palpable masses or adenopathy.  No focal areas of exquisite TTP.  Full elbow, wrist ROM.  No instability.  Positive Tinel's, negative Phalen's over carpal tunnel.   Does not have weakness with thumb abduction, .  Normal sensation in " median nerve distribution.  Good radial pulse.  Brisk cap refill    The right upper extremity is also examined.  He has a well-healed surgical scar.  Again, he has a positive Tinel's over the carpal tunnel but a negative Phalen's.  No weakness.  Sensation is normal.  Brisk cap refill.         Radiology:   AP, oblique, and lateral views of both hands are ordered by myself and reviewed to evaluate the patient's complaint.  No comparison films are immediately available.  The x-rays show no obvious acute abnormalities, lesions, masses, significant degenerative changes, or other concerning findings.    Assessment:  Bilateral carpal tunnel syndrome    Plan:  We discussed all available treatments for this condition in detail, both surgical and non-surgical.  He has only had symptoms for the last month or so and it sounds like they are intermittent.  As such, I think he is a good candidate for conservative treatment.  He was very interested in discussing surgical options.  I have advised him against that at this time.  I recommend that we try injections of both carpal tunnels as well as use of night splints.  He was fitted for the splints today.  The risks, benefits, and alternatives to the injections were discussed.  Both injections were performed as described below without complication.  Going forward, I will release him to follow-up as needed.  If his symptoms persist or recur, I told him to let me know and I will refer him for nerve studies.    Medium Joint Arthrocentesis  Date/Time: 10/16/2017 10:27 AM  Consent given by: patient  Site marked: site marked  Timeout: Immediately prior to procedure a time out was called to verify the correct patient, procedure, equipment, support staff and site/side marked as required   Supporting Documentation  Indications: pain   Procedure Details  Location: wrist (Right carpal tunnel) -   Preparation: Patient was prepped and draped in the usual sterile fashion  Needle size: 25  G  Approach: volar  Medications administered: 1 mL lidocaine 1 %; 80 mg methylPREDNISolone acetate 80 MG/ML; 1 mL bupivacaine  Patient tolerance: patient tolerated the procedure well with no immediate complications    Medium Joint Arthrocentesis  Date/Time: 10/16/2017 10:27 AM  Consent given by: patient  Site marked: site marked  Timeout: Immediately prior to procedure a time out was called to verify the correct patient, procedure, equipment, support staff and site/side marked as required   Supporting Documentation  Indications: pain   Procedure Details  Location: wrist (Left carpal tunnel) -   Preparation: Patient was prepped and draped in the usual sterile fashion  Needle size: 25 G  Approach: volar  Medications administered: 1 mL lidocaine 1 %; 80 mg methylPREDNISolone acetate 80 MG/ML; 1 mL bupivacaine  Patient tolerance: patient tolerated the procedure well with no immediate complications            Edilberto Virk MD    10/16/2017    CC to Miriam Mercado Reyes, MD

## 2017-10-31 ENCOUNTER — OUTSIDE FACILITY SERVICE (OUTPATIENT)
Dept: PAIN MEDICINE | Facility: CLINIC | Age: 47
End: 2017-10-31

## 2017-10-31 ENCOUNTER — DOCUMENTATION (OUTPATIENT)
Dept: PAIN MEDICINE | Facility: CLINIC | Age: 47
End: 2017-10-31

## 2017-10-31 PROCEDURE — 99152 MOD SED SAME PHYS/QHP 5/>YRS: CPT | Performed by: ANESTHESIOLOGY

## 2017-10-31 PROCEDURE — 64634 DESTROY C/TH FACET JNT ADDL: CPT | Performed by: ANESTHESIOLOGY

## 2017-10-31 PROCEDURE — 64633 DESTROY CERV/THOR FACET JNT: CPT | Performed by: ANESTHESIOLOGY

## 2017-11-02 NOTE — PROGRESS NOTES
Cervical Facet Nerve (Medial Branch) Radiofrequency Lesioning  (Two Needles Per Level technique)    [facility]      PREOPERATIVE DIAGNOSIS:  Cervical spondylosis without myelopathy   POSTOPERATIVE DIAGNOSIS:  Same as preoperative diagnosis    PROCEDURE:    Cervical Facet Nerve (medial branch) RF, with Fluoroscopy:      LEVELS: bilateral  C2, C3 and C4    PRE-PROCEDURE DISCUSSION WITH PATIENT:    Risks and complications were discussed with the patient prior to starting the procedure and informed consent was obtained.  We discussed various topics, including but not limited to bleeding, infection, injury, nerve injury, paralysis, coma, death, postprocedural soreness or painful flare, worsening of clinical picture, lack of pain relief.  We discussed the previous positive diagnostic nature of medial branch blockades.      SURGEON:  Toby Barrientos MD    REASON FOR PROCEDURE:    This patient had previously diagnostic CMBB of the same levels.  Neck pain and headache.    SEDATION:  Versed 8mg and Fentanyl 200 mcg IV and Ketorolac 30 mg IV  TIME OF PROCEDURE:  After administration of the IV sedative, the intraoperative procedure time was 29 minutes.     ANESTHETIC:   Lidocaine 2%  STEROID:   NONE  TOTAL VOLUME OF SOLUTION:  12 mL      DESCRIPTON OF PROCEDURE:  After obtaining informed consent, the patient was placed in the prone position. IV access was obtained.  EKG, blood pressure, and pulse oximeter were monitored and any & all sedation was administered by an RN under my direct guidance.  The cervical area was prepped with Chloraprep and draped with sterile barrier.     Under fluoroscopic guidance the waists of the above mentioned vertebra were identified and marked.  Skin and subcutaneous tissue were then anesthetized with 1% lidocaine 1mL at each point.  Then RF cannula needles were sequentially introduced under fluoroscopic guidance to the waists of these vertebrae contacting periosteum on the  lateral edge of the vertebra on the AP fluroscopic view. After confirming the position of the needle under fluoroscopic PA and lateral views, confirming the needle position in the center of the trapezoid at each level, a second needle was placed in parallel about 2 mm from the first at each level.  Similar approach was used, and the two needles were confirmed to be in the center of each trapezoid, parallel and about 2mm apart, and after confirming negative aspiration of blood and CSF, testing was initiated.  There was a lack of radicular stimulation on each needle at 3v and 2Hz.      Then, in each needle, 1mL of the aforementioned local anesthetic was instilled.  After 2 minutes had elapsed, Radiofrequency thermal lesioning was initiated for 2 minutes at 80 degrees Celsius.      Needles were removed intact from all levels.  Vitals were stable throughout.       ESTIMATED BLOOD LOSS:  minimal  SPECIMENS:  None    COMPLICATIONS:    No complications were noted.    TOLERANCE & DISCHARGE CONDITION:    The patient tolerated the procedure well.  The patient was transported to the recovery area without difficulties.  The patient was discharged to home under the care of family in stable and satisfactory condition.  The patient did notice improvement in pain on extension and rotation of the cervical spine.      PLAN OF CARE:  1. The patient was given our standard instruction sheet.  2. We discussed that Cervical Medial Branch Blockade is a diagnostic procedure in consideration for radiofrequency ablation if two diagnostic procedures proved to be positive for significant benefit.  If sustained relief of six to eight weeks is obtained, then an alternative plan could be therapeutic cervical medial branch blocks on an as-needed basis.  3. The patient is asked to keep a pain log hourly for 8 hours postoperatively today.  4. The patient will Return to clinic 6 wks.  5. The patient will resume all medications as per the medication  reconciliation sheet.

## 2017-11-27 ENCOUNTER — TELEPHONE (OUTPATIENT)
Dept: NEUROSURGERY | Facility: CLINIC | Age: 47
End: 2017-11-27

## 2017-11-27 ENCOUNTER — APPOINTMENT (OUTPATIENT)
Dept: MRI IMAGING | Facility: HOSPITAL | Age: 47
End: 2017-11-27

## 2017-11-27 ENCOUNTER — HOSPITAL ENCOUNTER (EMERGENCY)
Facility: HOSPITAL | Age: 47
Discharge: HOME | End: 2017-11-27
Attending: EMERGENCY MEDICINE | Admitting: EMERGENCY MEDICINE

## 2017-11-27 VITALS
DIASTOLIC BLOOD PRESSURE: 93 MMHG | OXYGEN SATURATION: 93 % | RESPIRATION RATE: 15 BRPM | SYSTOLIC BLOOD PRESSURE: 133 MMHG | TEMPERATURE: 98.3 F | WEIGHT: 170 LBS | HEART RATE: 69 BPM | BODY MASS INDEX: 25.18 KG/M2 | HEIGHT: 69 IN

## 2017-11-27 DIAGNOSIS — M50.20 CERVICAL DISC HERNIATION: Primary | ICD-10-CM

## 2017-11-27 LAB
ALBUMIN SERPL-MCNC: 3.9 G/DL (ref 3.5–5.2)
ALBUMIN/GLOB SERPL: 1.3 G/DL
ALP SERPL-CCNC: 59 U/L (ref 39–117)
ALT SERPL W P-5'-P-CCNC: 24 U/L (ref 1–41)
ANION GAP SERPL CALCULATED.3IONS-SCNC: 12.3 MMOL/L
AST SERPL-CCNC: 21 U/L (ref 1–40)
BASOPHILS # BLD AUTO: 0.01 10*3/MM3 (ref 0–0.2)
BASOPHILS NFR BLD AUTO: 0.2 % (ref 0–1.5)
BILIRUB SERPL-MCNC: 0.3 MG/DL (ref 0.1–1.2)
BUN BLD-MCNC: 15 MG/DL (ref 6–20)
BUN/CREAT SERPL: 16.7 (ref 7–25)
CALCIUM SPEC-SCNC: 8.8 MG/DL (ref 8.6–10.5)
CHLORIDE SERPL-SCNC: 102 MMOL/L (ref 98–107)
CO2 SERPL-SCNC: 24.7 MMOL/L (ref 22–29)
CREAT BLD-MCNC: 0.9 MG/DL (ref 0.76–1.27)
DEPRECATED RDW RBC AUTO: 45.6 FL (ref 37–54)
EOSINOPHIL # BLD AUTO: 0.12 10*3/MM3 (ref 0–0.7)
EOSINOPHIL NFR BLD AUTO: 2.1 % (ref 0.3–6.2)
ERYTHROCYTE [DISTWIDTH] IN BLOOD BY AUTOMATED COUNT: 14 % (ref 11.5–14.5)
GFR SERPL CREATININE-BSD FRML MDRD: 110 ML/MIN/1.73
GLOBULIN UR ELPH-MCNC: 3 GM/DL
GLUCOSE BLD-MCNC: 100 MG/DL (ref 65–99)
HCT VFR BLD AUTO: 39.8 % (ref 40.4–52.2)
HGB BLD-MCNC: 13.8 G/DL (ref 13.7–17.6)
IMM GRANULOCYTES # BLD: 0 10*3/MM3 (ref 0–0.03)
IMM GRANULOCYTES NFR BLD: 0 % (ref 0–0.5)
LYMPHOCYTES # BLD AUTO: 1.94 10*3/MM3 (ref 0.9–4.8)
LYMPHOCYTES NFR BLD AUTO: 34.7 % (ref 19.6–45.3)
MCH RBC QN AUTO: 30.5 PG (ref 27–32.7)
MCHC RBC AUTO-ENTMCNC: 34.7 G/DL (ref 32.6–36.4)
MCV RBC AUTO: 87.9 FL (ref 79.8–96.2)
MONOCYTES # BLD AUTO: 0.75 10*3/MM3 (ref 0.2–1.2)
MONOCYTES NFR BLD AUTO: 13.4 % (ref 5–12)
NEUTROPHILS # BLD AUTO: 2.77 10*3/MM3 (ref 1.9–8.1)
NEUTROPHILS NFR BLD AUTO: 49.6 % (ref 42.7–76)
PLATELET # BLD AUTO: 238 10*3/MM3 (ref 140–500)
PMV BLD AUTO: 9.9 FL (ref 6–12)
POTASSIUM BLD-SCNC: 4 MMOL/L (ref 3.5–5.2)
PROT SERPL-MCNC: 6.9 G/DL (ref 6–8.5)
RBC # BLD AUTO: 4.53 10*6/MM3 (ref 4.6–6)
SODIUM BLD-SCNC: 139 MMOL/L (ref 136–145)
WBC NRBC COR # BLD: 5.59 10*3/MM3 (ref 4.5–10.7)

## 2017-11-27 PROCEDURE — 96374 THER/PROPH/DIAG INJ IV PUSH: CPT

## 2017-11-27 PROCEDURE — 80053 COMPREHEN METABOLIC PANEL: CPT | Performed by: NURSE PRACTITIONER

## 2017-11-27 PROCEDURE — 99284 EMERGENCY DEPT VISIT MOD MDM: CPT

## 2017-11-27 PROCEDURE — 85025 COMPLETE CBC W/AUTO DIFF WBC: CPT | Performed by: NURSE PRACTITIONER

## 2017-11-27 PROCEDURE — 0 GADOBENATE DIMEGLUMINE 529 MG/ML SOLUTION: Performed by: EMERGENCY MEDICINE

## 2017-11-27 PROCEDURE — 72156 MRI NECK SPINE W/O & W/DYE: CPT

## 2017-11-27 PROCEDURE — A9577 INJ MULTIHANCE: HCPCS | Performed by: EMERGENCY MEDICINE

## 2017-11-27 PROCEDURE — 25010000002 KETOROLAC TROMETHAMINE PER 15 MG: Performed by: NURSE PRACTITIONER

## 2017-11-27 RX ORDER — SODIUM CHLORIDE 0.9 % (FLUSH) 0.9 %
10 SYRINGE (ML) INJECTION AS NEEDED
Status: DISCONTINUED | OUTPATIENT
Start: 2017-11-27 | End: 2017-11-27 | Stop reason: HOSPADM

## 2017-11-27 RX ORDER — OXYCODONE AND ACETAMINOPHEN 10; 325 MG/1; MG/1
1 TABLET ORAL EVERY 4 HOURS PRN
Qty: 12 TABLET | Refills: 0 | Status: SHIPPED | OUTPATIENT
Start: 2017-11-27 | End: 2017-11-28 | Stop reason: SDUPTHER

## 2017-11-27 RX ORDER — METHYLPREDNISOLONE 4 MG/1
TABLET ORAL
Qty: 21 EACH | Refills: 0 | Status: SHIPPED | OUTPATIENT
Start: 2017-11-27 | End: 2017-12-11

## 2017-11-27 RX ORDER — MELOXICAM 15 MG/1
15 TABLET ORAL DAILY
COMMUNITY
Start: 2017-10-24 | End: 2018-04-19

## 2017-11-27 RX ORDER — GABAPENTIN 600 MG/1
600 TABLET ORAL 3 TIMES DAILY
Qty: 9 TABLET | Refills: 0 | Status: SHIPPED | OUTPATIENT
Start: 2017-11-27 | End: 2017-11-28 | Stop reason: SDUPTHER

## 2017-11-27 RX ORDER — KETOROLAC TROMETHAMINE 30 MG/ML
30 INJECTION, SOLUTION INTRAMUSCULAR; INTRAVENOUS ONCE
Status: COMPLETED | OUTPATIENT
Start: 2017-11-27 | End: 2017-11-27

## 2017-11-27 RX ADMIN — GADOBENATE DIMEGLUMINE 15 ML: 529 INJECTION, SOLUTION INTRAVENOUS at 14:09

## 2017-11-27 RX ADMIN — KETOROLAC TROMETHAMINE 30 MG: 30 INJECTION, SOLUTION INTRAMUSCULAR at 12:00

## 2017-11-27 NOTE — DISCHARGE INSTRUCTIONS
Medications as ordered  Ice to neck  Keep scheduled appointment with Dr Barrientos on Thursday  You have an appointment with Dr Martinez on December 13th at 12:30- call the office if you develop weakness, loss of bowel/bladder function or any changes in symptoms  Return to er for increased pain, weakness, loss of bowel/bladder function or any new or worsening symptoms

## 2017-11-27 NOTE — ED PROVIDER NOTES
EMERGENCY DEPARTMENT ENCOUNTER    CHIEF COMPLAINT  Chief Complaint: neck pain  History given by: patient  History limited by: N/A  Room Number: 27/27  PMD: Miriam Mercado Reyes, MD  Pain Management- Dr Barrientos (has appt on 11/30/17)  Orthopedic surgeon- Dr Virk    HPI:  Pt is a 47 y.o. male who presents with worsening of chronic neck pain that started about 4 weeks ago after he underwent bilateral cervical facet nerve radiofrequency ablation on 10/31/17 at Dr Barrientos's office for treatment of cervical spondylosis. It radiates to the occiput and BUE and is exacerbated by head/neck movement and BUE movement. He denies any new injury or trauma, any other headache, vision changes, dizziness, back pain, acute change in chronic intermittent BUE numbness/tingling, motor loss, bladder dysfunction, bowel dysfunction, saddle anesthesia, fevers, chills, chest pain, trouble breathing, N/V/D, pain and difficulty with urination, and abd pain. He notes that the neck pain has not characteristically changed since prior to the procedure but is now more intense. He also reports that he underwent 2 epidurals to his neck prior to the cervical facet nerve radiofrequency ablation. Pt states that he called Dr Barrientos's office this morning and was referred to ED for further evaluation. He also notes that he is not on any pain medication. Past Medical History of chronic neck pain, chronic intermittent BUE numbness/tingling, and HTN.     Duration: started about 4 weeks ago   Timing: constant  Location: neck  Radiation: occiput, BUE  Quality: pain  Intensity/Severity: moderate  Progression: worsening  Associated Symptoms: none  Aggravating Factors: head/neck movement, BUE movement  Alleviating Factors: none  Previous Episodes: Pt notes that the neck pain has not characteristically changed since prior to the cervical facet nerve radiofrequency ablation on 10/31/17 but is now more intense.  Treatment before arrival: Pt states that he called  Dr Barrientos's office this morning and was referred to ED for further evaluation.    PAST MEDICAL HISTORY  Active Ambulatory Problems     Diagnosis Date Noted   • Degeneration of intervertebral disc of mid-cervical region 05/17/2016   • Neck pain 02/10/2017   • Degeneration of intervertebral disc of high cervical region 02/10/2017   • Hoarseness of voice 08/14/2017   • Chronic pain 08/24/2017   • Degenerative cervical disc 08/24/2017   • Facet arthropathy, cervical 08/24/2017     Resolved Ambulatory Problems     Diagnosis Date Noted   • Follow-up examination, following other surgery 02/23/2016     Past Medical History:   Diagnosis Date   • Hypercholesterolemia    • Hypertension    • Migraine    • Neck pain    • Peripheral neuropathy        PAST SURGICAL HISTORY  Past Surgical History:   Procedure Laterality Date   • HAND SURGERY Right 05/2008   • NECK SURGERY  02/09/2016    ACDF- C4-C5,C5-C6,C6-C7-Dr. Martinez        FAMILY HISTORY  Family History   Problem Relation Age of Onset   • No Known Problems Other        SOCIAL HISTORY  Social History     Social History   • Marital status:      Spouse name: N/A   • Number of children: N/A   • Years of education: N/A     Occupational History   • Not on file.     Social History Main Topics   • Smoking status: Former Smoker     Packs/day: 0.50     Quit date: 12/2016   • Smokeless tobacco: Never Used   • Alcohol use Yes      Comment: social   • Drug use: No   • Sexual activity: Not on file     Other Topics Concern   • Not on file     Social History Narrative         ALLERGIES  Review of patient's allergies indicates no known allergies.    REVIEW OF SYSTEMS  Review of Systems   Constitutional: Negative for chills and fever.   HENT: Negative for sore throat.    Eyes: Negative for visual disturbance.   Respiratory: Negative for shortness of breath.    Cardiovascular: Negative for chest pain.   Gastrointestinal: Negative for abdominal pain, diarrhea, nausea and vomiting.         No bowel dysfunction   Genitourinary: Negative for difficulty urinating and dysuria.        No bladder dysfunction   Musculoskeletal: Positive for neck pain (worsening of chronic neck pain that radiates to occiput and BUE). Negative for back pain.   Skin: Negative for rash.   Neurological: Positive for headaches (worsening of chronic neck pain radiates to occiput, no other headache). Negative for dizziness and weakness. Numbness: chronic intermittent BUE numbness and tingling (no acute change)        No saddle anesthesia   Psychiatric/Behavioral: The patient is not nervous/anxious.        PHYSICAL EXAM  ED Triage Vitals   Temp Heart Rate Resp BP SpO2   11/27/17 0945 11/27/17 0945 11/27/17 0945 11/27/17 1006 11/27/17 0945   98.3 °F (36.8 °C) 75 18 129/88 100 % WNL       Physical Exam   Constitutional: He is oriented to person, place, and time and well-developed, well-nourished, and in no distress. No distress.   HENT:   Head: Normocephalic and atraumatic.   Mouth/Throat: Mucous membranes are normal.   Eyes: EOM are normal. Pupils are equal, round, and reactive to light. No scleral icterus.   Neck: Neck supple.   c-spine tenderness   Cardiovascular: Normal rate, regular rhythm and normal heart sounds.    Pulses:       Radial pulses are 2+ on the right side, and 2+ on the left side.   Pulmonary/Chest: Effort normal and breath sounds normal. No respiratory distress.   Musculoskeletal: Normal range of motion.   No t-spine or l-spine tenderness, normal capillary refill to BUE, NV intact distally to BUE   Neurological: He is alert and oriented to person, place, and time. He has normal motor skills and normal sensation.   Equal  strength bilaterally, sensation and motor function intact to BUE   Skin: Skin is warm and dry.   Psychiatric: Mood and affect normal.   Nursing note and vitals reviewed.      LAB RESULTS  Recent Results (from the past 24 hour(s))   Comprehensive Metabolic Panel    Collection Time: 11/27/17  12:01 PM   Result Value Ref Range    Glucose 100 (H) 65 - 99 mg/dL    BUN 15 6 - 20 mg/dL    Creatinine 0.90 0.76 - 1.27 mg/dL    Sodium 139 136 - 145 mmol/L    Potassium 4.0 3.5 - 5.2 mmol/L    Chloride 102 98 - 107 mmol/L    CO2 24.7 22.0 - 29.0 mmol/L    Calcium 8.8 8.6 - 10.5 mg/dL    Total Protein 6.9 6.0 - 8.5 g/dL    Albumin 3.90 3.50 - 5.20 g/dL    ALT (SGPT) 24 1 - 41 U/L    AST (SGOT) 21 1 - 40 U/L    Alkaline Phosphatase 59 39 - 117 U/L    Total Bilirubin 0.3 0.1 - 1.2 mg/dL    eGFR  African Amer 110 >60 mL/min/1.73    Globulin 3.0 gm/dL    A/G Ratio 1.3 g/dL    BUN/Creatinine Ratio 16.7 7.0 - 25.0    Anion Gap 12.3 mmol/L   CBC Auto Differential    Collection Time: 11/27/17 12:01 PM   Result Value Ref Range    WBC 5.59 4.50 - 10.70 10*3/mm3    RBC 4.53 (L) 4.60 - 6.00 10*6/mm3    Hemoglobin 13.8 13.7 - 17.6 g/dL    Hematocrit 39.8 (L) 40.4 - 52.2 %    MCV 87.9 79.8 - 96.2 fL    MCH 30.5 27.0 - 32.7 pg    MCHC 34.7 32.6 - 36.4 g/dL    RDW 14.0 11.5 - 14.5 %    RDW-SD 45.6 37.0 - 54.0 fl    MPV 9.9 6.0 - 12.0 fL    Platelets 238 140 - 500 10*3/mm3    Neutrophil % 49.6 42.7 - 76.0 %    Lymphocyte % 34.7 19.6 - 45.3 %    Monocyte % 13.4 (H) 5.0 - 12.0 %    Eosinophil % 2.1 0.3 - 6.2 %    Basophil % 0.2 0.0 - 1.5 %    Immature Grans % 0.0 0.0 - 0.5 %    Neutrophils, Absolute 2.77 1.90 - 8.10 10*3/mm3    Lymphocytes, Absolute 1.94 0.90 - 4.80 10*3/mm3    Monocytes, Absolute 0.75 0.20 - 1.20 10*3/mm3    Eosinophils, Absolute 0.12 0.00 - 0.70 10*3/mm3    Basophils, Absolute 0.01 0.00 - 0.20 10*3/mm3    Immature Grans, Absolute 0.00 0.00 - 0.03 10*3/mm3       I ordered the above labs and reviewed the results      RADIOLOGY         MRI Cervical Spine With & Without Contrast (Final result) Result time: 11/27/17 16:18:28     Procedure changed from MRI Cervical Spine With Contrast          Final result by Axel Estevez MD (11/27/17 16:18:28)     Narrative:     MR SCAN OF THE CERVICAL SPINE WITHOUT AND WITH  INTRAVENOUS CONTRAST     HISTORY: Previous multilevel fusion. Has severe neck pain.     FINDINGS:  The MR scan was performed as an emergency procedure with  sagittal and axial images and includes T1 images without and with  intravenous contrast. The following findings are present:  1. The patient has previous anterior fusion extending from C4 to C7 with  interbody grafts and anterior plate with screws. At C3-4 above the  fusion, there is a large broad-based disc herniation causing flattening  deformity of the spinal cord. The T2 sequence shows some evidence of  myelomalacia and cord edema related to the cord compression at this  level. There is also slight bilateral foraminal encroachment with  lateral facet spurring, right greater than left.  2. At C2-3, there is a large right paracentral disc herniation extending  into the right neural foramen with very severe right foraminal  encroachment.  3. The cervicomedullary junction is normal in position. The remainder of  the spinal cord is unremarkable.  4. At C7-T1 below the fusion, there is a localized right paracentral  disc herniation causing localized flattening of the anterior margin of  the spinal cord on the right. There is no foraminal encroachment.     This report was finalized on 11/27/2017 4:18 PM by Dr. Forrest Estevez MD.              I ordered the above noted radiological studies and reviewed the images on the PACS system.  Spoke with Dr. Estevez (radiologist) regarding MRI c-spine scan results        MEDICAL RECORD REVIEW  Pt underwent cervical facet nerve radiofrequency ablation with fluoroscopy by Dr Barrientos on 10/31/17. Pt was seen by Dr Virk on 10/16/17 for bilateral hand pain and numbness and underwent steroid injections of bilateral carpal tunnels.         PROGRESS AND CONSULTS  11:13 AM- Reviewed pt's history and workup with Dr. Viera.  At bedside evaluation, they agree with the plan of care.  11:16 AM- Ordered toradol for pain.   3:44 PM-  MRI c-spine shows cervical disc herniations. Sent call out to on call neurosurgeon for consult.   4:06 PM- Discussed case with DREW Chicas for Dr Martinez (neurosurgeon)  Reviewed history, exam, results and treatments.  Discussed concerns and plan of care. Renetta has reviewed MRI c-spine. She will speak to Dr Martinez and then call me back.   4:18 PM- APRN Renetta Aviva states that they will see pt in the office on 12/13/17 at 1230. Renetta would like for pt to be prescribed neurontin, medrol dosepack, and enough pain medication until he can follow up with Dr Barrientos (pain management specialist) on 11/30/17. Renetta would also like for pt to call their office to arrange for an earlier appointment if he develops any new neurologic deficits or worsening sx. Renetta states that she will write a note stating that she has provided me with permission to prescribe pain medication from ED.   4:21 PM- Rechecked pt. He is resting comfortably and is in no acute distress. Reviewed implications of results (including stable labs, MRI c-spine findings (cervical disc herniations)), diagnosis, meds, responsibility to follow up, warning signs and symptoms of possible worsening, potential complications and reasons to return to ER with patient.  Discussed all results and noted any abnormalities with patient. Discussed the importance of and the absolute need to recheck abnormalities (cervical disc herniations) and condition with pain management specialist on 11/30/17 as scheduled and with referred neurosurgeon on 12/13/17 at 1230. Informed pt of plan to prescribe neurontin, steroid pack, and short course of pain medication for neck pain. Advised pt to apply ice to neck. Instructed pt to call referred neurosurgeon's office and to [facility] if he develops increased pain, weakness, loss of bowel/bladder function, or any new or worsening symptoms.  Discussed plan for discharge, as there is no emergent indication for admission.  Pt is agreeable and  "understands need for follow up and repeat testing.  Pt is aware that discharge does not mean that nothing is wrong but it indicates no emergency is present.  Pt is discharged with instructions to follow up with primary care doctor to have their blood pressure rechecked.       DIAGNOSIS  Final diagnoses:   Cervical disc herniation       FOLLOW UP   Toby Barrientos MD  [address]  [facility] [address]  [facility] [id number] [phone]      keep scheduled appointment on Thursday    Fred Martinez MD  [address] [facility] [address]  [facility] [id number] [phone]      You have an appointment on December 13th at 12:30      RX     Medication List         gabapentin 600 MG tablet   Commonly known as:  NEURONTIN   Take 1 tablet by mouth 3 (Three) Times a Day.       MethylPREDNISolone 4 MG tablet   Commonly known as:  MEDROL (RICARDA)   Take as directed on package instructions.       oxyCODONE-acetaminophen  MG per tablet   Commonly known as:  PERCOCET   Take 1 tablet by mouth Every 4 (Four) Hours As Needed for Moderate Pain .           [facility] report [id number] reviewed.  Risks, benefits, alternatives discussed with patient.  Pt consents to treatment and agrees to follow up with PMD tomorrow for further care and any other prescriptions.           COURSE & MEDICAL DECISION MAKING  Pertinent Labs and Imaging studies that were ordered and reviewed are noted above.  Results were reviewed/discussed with the patient and they were also made aware of online assess.   Pt also made aware that some labs, such as cultures, will not be resulted during ER visit and follow up with PMD is necessary.     MEDICATIONS GIVEN IN ER  Medications   sodium chloride 0.9 % flush 10 mL (not administered)   ketorolac (TORADOL) injection 30 mg (30 mg Intravenous Given 11/27/17 1200)   gadobenate dimeglumine (MULTIHANCE) injection 15 mL (15 mL Intravenous Given 11/27/17 1409)       /93  Pulse 69  Temp 98.3 °F (36.8 °C)  Resp 15  Ht 69\" (175.3 cm) "  Wt 170 lb (77.1 kg)  SpO2 93%  BMI 25.1 kg/m2      I personally reviewed the past medical history, past surgical history, social history, family history, current medications and allergies as they appear in this chart.  The scribe's note accurately reflects the work and decisions made by me.     Documentation assistance provided by rafi Patel for GIOVANNA Martínez on 11/27/2017 at 4:47 PM. Information recorded by the scribe was done at my direction and has been verified and validated by me.     Drew Patel  11/27/17 2384       Peggy Nielsen, DREW  11/27/17 1150

## 2017-11-27 NOTE — ED NOTES
"Pt to ED for chronic neck pain x2 years, states he sees pain management. States on 10/31 \"they burned the nerves in my neck\" and it hasn't been the same since then. Pt c/o mostly right sided neck pain and stiffness with pain moving down right arm. Pt on Mobic, denies improvement of symptoms. Pt unable to get into pain management Dr. Singleton until this Thursday. Pt also called Dr. Gregorio's office and unable to get in, referred to ED     Bonnie Craft RN  11/27/17 7999    "

## 2017-11-27 NOTE — ED PROVIDER NOTES
"History    The patient presents to the ED complaining of progressively worsening acute-on-chronic \"dull\" diffuse neck pain which seemed to intensify after he underwent bilateral cervical facet nerve radiofrequency ablation on 10/31/17.  Patient explains that he has chronic numbness of his bilateral hands but he denies any recent fever, nausea, vomiting, incontinence of bowel / bladder or saddle anesthesia. The patient has h/o cervical fusion by  and he has no other complaints at this time.    On physical exam, AOx3, normal heart and lungs,  diffuse tenderness c spine and posterior neck, equal sensation BLE and BUE, no focal weakness     Given the recent procedure on the patient's neck and intensified pain, I plan to MRI the patient's C spine for further evaluation.      1542  Reviewed MRI C spine - The patient has previous anterior fusion extending from C4 to C7 with interbody grafts and anterior plate with screws. At C3-4 above the fusion, there is a large broad-based disc herniation causing flattening  deformity of the spinal cord. The T2 sequence shows some evidence of myelomalacia and cord edema related to the cord compression at this level. There is also slight bilateral foraminal encroachment with lateral facet spurring, right greater than left. At C2-3, there is a large right paracentral disc herniation extending into the right neural foramen with very severe right foraminal encroachment. The cervicomedullary junction is normal in position. The remainder of  the spinal cord is unremarkable. At C7-T1 below the fusion, there is a localized right paracentral disc herniation causing localized flattening of the anterior margin of the spinal cord on the right. There is no foraminal encroachment.. Independently viewed by me. Interpreted by radiologist.     1544  I suggested consulting  (neurosurgery).       I supervised care provided by the midlevel provider.  We have discussed this " patient's history, physical exam, and treatment plan. I have reviewed the note and personally saw and examined the patient and agree with the plan of care.    Documentation assistance provided by rafi Fernández for .  Information recorded by the dutchibmagui was done at my direction and has been verified and validated by me.          Jeffrey Fernández  11/27/17 1544       Bradford Viera MD  11/27/17 1947

## 2017-11-27 NOTE — TELEPHONE ENCOUNTER
Got a call from ER.  Pt presented with increased neck pain. No arm pain, no weakness. Has some hand N/T.  No bowel or bladder incontinence.      MRI done today. Shows progression of cervical stenosis above his previous fusion, some cord compression. Dr. Martinez reviewed the films as well.     Ok for pt to go home. He has an appt this week with pain mgmt.  ER is going to give him some MDP, gabapentin.  Will offer some pain meds until he can see Dr. Barrientos.  Otherwise pt will call with any arm pain, weakness, increased N/T or incontinence and follow up with Dr. Martinez on 12/13 at 12:30pm.

## 2017-11-28 ENCOUNTER — OFFICE VISIT (OUTPATIENT)
Dept: PAIN MEDICINE | Facility: CLINIC | Age: 47
End: 2017-11-28

## 2017-11-28 VITALS
DIASTOLIC BLOOD PRESSURE: 85 MMHG | RESPIRATION RATE: 18 BRPM | BODY MASS INDEX: 25.2 KG/M2 | HEART RATE: 78 BPM | WEIGHT: 176 LBS | HEIGHT: 70 IN | OXYGEN SATURATION: 98 % | SYSTOLIC BLOOD PRESSURE: 138 MMHG | TEMPERATURE: 98.1 F

## 2017-11-28 DIAGNOSIS — M50.30 DEGENERATIVE CERVICAL DISC: ICD-10-CM

## 2017-11-28 DIAGNOSIS — M50.320 DEGENERATION OF INTERVERTEBRAL DISC OF MID-CERVICAL REGION, UNSPECIFIED SPINAL LEVEL: ICD-10-CM

## 2017-11-28 DIAGNOSIS — G89.29 OTHER CHRONIC PAIN: Primary | ICD-10-CM

## 2017-11-28 DIAGNOSIS — M50.20 CERVICAL DISC HERNIATION: ICD-10-CM

## 2017-11-28 DIAGNOSIS — M47.812 FACET ARTHROPATHY, CERVICAL: ICD-10-CM

## 2017-11-28 PROCEDURE — 99214 OFFICE O/P EST MOD 30 MIN: CPT | Performed by: NURSE PRACTITIONER

## 2017-11-28 RX ORDER — OXYCODONE AND ACETAMINOPHEN 10; 325 MG/1; MG/1
1 TABLET ORAL EVERY 4 HOURS PRN
Qty: 18 TABLET | Refills: 0 | Status: SHIPPED | OUTPATIENT
Start: 2017-11-28 | End: 2017-12-06 | Stop reason: SDUPTHER

## 2017-11-28 RX ORDER — GABAPENTIN 600 MG/1
600 TABLET ORAL 3 TIMES DAILY
Qty: 90 TABLET | Refills: 1 | Status: SHIPPED | OUTPATIENT
Start: 2017-11-28 | End: 2018-04-19 | Stop reason: SDUPTHER

## 2017-11-28 NOTE — PROGRESS NOTES
"CHIEF COMPLAINT  Patient states that his neck pain has increased since the RF. He was in the ER last night and states the MRI shows a herniated disc C3-C4.  Initial Evaluation by Flaquita RUSSELL    Subjective   Dave Flynn is a 47 y.o. male  who presents to the office for follow-up of procedure.  He completed a Cervical RF   on  10/31/17 performed by Dr. Da Silva for management of neck pain. Patient reports no relief from the procedure.     Patient was initially evaluated by Karen RUSSELL on 8-24-17 for neck pain. Was ordered to have bilateral C2-C5 MBB x1 with consideration for RFL (previous RFL with significant relief). Dr. Da Silva performed bilateral C2-C4 RFL on 10-31-17. Patient presented to ED yesterday complaining of progressive worsening of dull diffuse neck pain which seemed to intensify after RFA on 10-31-17.  He has chronic numbness in bilateral hands.  Denies any fever.  Has a history of cervical fusion by Dr. Martinez.  Ordered MRI of cervical spine.  MRI cervical spine shows cervical disc herniations.  [place] called to neurosurgery for consult.  Discussed with Renetta EATON from Dr. Martinez. She would like for patient to be prescribed neurontin, MDP and pain medication until he can see Dr. da silva. Also call and arrange earlier appointment with Dr. Martinez.  Scheduled with Dr. Martinez on 12-13-17.     Reviewed with staff this morning. He called yesterday morning saying he had intense pain. He was offered an appointment for today. However, he was NOT referred to the ER by this office. He also called Dr. Martinez's office and was referred to ER. He also called PCP, but they called not get into their office until tomorrow.     He states following the procedure, he did not have much relief. His pain started to increase the week of Thanksgiving. Does not remember any specific trauma to area. He states \"thanksgiving day,i could barely hold my head up because of the pain.\"     He " "went to Er last night. Was given steroids, gabapentin and oxycodone.  Is no longer taking Meloxicam(previously prescribed by Dr. Reyes).     Complains of pain in his neck.  Also also complains of numbness in RUE. Today his pain is 7/10VAS. Describes the pain as continuous. Pain increases with certain movements; pain decreases with rest and medication.  He was taking meloxicam 15 mg daily but is no longer taking this. He started gabapentin 600 mg TID and is also taking percocet 10/325 1q4hrs., as well as the steroids as directed.  Denies any side effects from the regimen. It does help his pain minimally. ADL's by self. Denies any fever or chills.     Has been seen by hand specialist and had injections in both hands.     He did state \"Did this RFA cause my herniated disc? I didn't have this problem before the procedure.\"  Neck Pain    This is a chronic problem. The current episode started more than 1 year ago. The problem occurs constantly. Progression since onset: worse since last office visit. The pain is associated with nothing. The pain is present in the left side, right side, midline and occipital region. The quality of the pain is described as aching and burning. The pain is at a severity of 7/10. The pain is severe. The symptoms are aggravated by position and twisting. Associated symptoms include headaches, numbness and weakness. Pertinent negatives include no chest pain or fever. He has tried NSAIDs, oral narcotics, neck support, muscle relaxants, ice and heat (cervical MBB--temporary significant relief) for the symptoms. The treatment provided mild relief.      PEG Assessment   What number best describes your pain on average in the past week?7  What number best describes how, during the past week, pain has interfered with your enjoyment of life?10  What number best describes how, during the past week, pain has interfered with your general activity?  10    The following portions of the patient's history were " "reviewed and updated as appropriate: allergies, current medications, past family history, past medical history, past social history, past surgical history and problem list.    Review of Systems   Constitutional: Negative for chills and fever.   Respiratory: Negative for shortness of breath.    Cardiovascular: Negative for chest pain.   Gastrointestinal: Negative for constipation, diarrhea, nausea and vomiting.   Genitourinary: Negative for difficulty urinating, dysuria and enuresis.   Musculoskeletal: Positive for neck pain.   Neurological: Positive for weakness, numbness and headaches. Negative for dizziness and light-headedness.   Psychiatric/Behavioral: Positive for sleep disturbance. Negative for confusion, hallucinations, self-injury and suicidal ideas. The patient is not nervous/anxious.        Vitals:    11/28/17 0803   BP: 138/85   Pulse: 78   Resp: 18   Temp: 98.1 °F (36.7 °C)   SpO2: 98%   Weight: 176 lb (79.8 kg)   Height: 69.5\" (176.5 cm)   PainSc:   7   PainLoc: Neck     Objective   Physical Exam   Constitutional: He is oriented to person, place, and time. He appears well-developed and well-nourished.   HENT:   Head: Normocephalic.   Eyes: Conjunctivae are normal.   Neck: Trachea normal. Neck supple. Spinous process tenderness present. No muscular tenderness present. Decreased range of motion present.   Cardiovascular: Normal rate, regular rhythm and normal heart sounds.    Pulmonary/Chest: Effort normal and breath sounds normal. No respiratory distress.   Musculoskeletal:        Cervical back: He exhibits decreased range of motion, tenderness and pain.   +tenderness cervical facets/+cervical facet loading, +bilateral occipital tenderness    Neurological: He is alert and oriented to person, place, and time. No cranial nerve deficit. Gait normal.   Reflex Scores:       Bicep reflexes are 1+ on the right side and 1+ on the left side.       Brachioradialis reflexes are 1+ on the right side and 1+ on the " left side.  Skin: Skin is warm and dry.   Psychiatric: He has a normal mood and affect. His behavior is normal.   Nursing note and vitals reviewed.        Assessment/Plan   Dave was seen today for neck pain.    Diagnoses and all orders for this visit:    Other chronic pain    Facet arthropathy, cervical    Degenerative cervical disc    Degeneration of intervertebral disc of mid-cervical region, unspecified spinal level    Cervical disc herniation    Other orders  -     oxyCODONE-acetaminophen (PERCOCET)  MG per tablet; Take 1 tablet by mouth Every 4 (Four) Hours As Needed for Moderate Pain .  -     gabapentin (NEURONTIN) 600 MG tablet; Take 1 tablet by mouth 3 (Three) Times a Day.      --- The urine drug screen confirmation from 8-24-17 has been reviewed and the result is appropriate based on patient history and SINAN report  --- Acute supply of Percocet 10/325 1q4hrs #18 which includes only a 3 day supply by [place] statutes. Discussed medication with the patient.  Included in this discussion was the potential for side effects and adverse events.  Patient verbalized understanding and wished to proceed.  Prescription will be given to patient. This is not the long-term plan of care.  --- Continue with MDP as prescribed.  --- Prescription for gabapentin 600 mg TID. Discussed medication with the patient.  Included in this discussion was the potential for side effects and adverse events.  Patient verbalized understanding and wished to proceed.  Prescription will be given to patient.  The patient will be started on a trial of gabapentin. he will be started on gabapentin 300 mg once nightly for one week. Will then increase to twice daily for one week. Patient will then increase to three times daily as tolerated. Reviewed potential side effects, including somnolence and dizziness.   --- Follow-up with Dr. Martinez as scheduled.   --- Could consider SCS if no surgery warranted with Dr. Martinez.    --- Reviewed  extensively the RFL procedure.   --- Follow-up as scheduled. Or sooner if needed.         SINAN REPORT    As part of the patient's treatment plan, I am prescribing controlled substances. The patient has been made aware of appropriate use of such medications, including potential risk of somnolence, limited ability to drive and/or work safely, and the potential for dependence or overdose. It has also bee made clear that these medications are for use by this patient only, without concomitant use of alcohol or other substances unless prescribed.     Patient has completed prescribing agreement detailing terms of continued prescribing of controlled substances, including monitoring SINAN reports, urine drug screening, and pill counts if necessary. The patient is aware that inappropriate use will results in cessation of prescribing such medications.    SINAN report has been reviewed and scanned into the patient's chart.    Date of last SINAN : 11-27-17    History and physical exam exhibit continued safe and appropriate use of controlled substances.       EMR Dragon/Transcription disclaimer:   Much of this encounter note is an electronic transcription/translation of spoken language to printed text. The electronic translation of spoken language may permit erroneous, or at times, nonsensical words or phrases to be inadvertently transcribed; Although I have reviewed the note for such errors, some may still exist.

## 2017-12-06 RX ORDER — OXYCODONE AND ACETAMINOPHEN 10; 325 MG/1; MG/1
1 TABLET ORAL EVERY 4 HOURS PRN
Qty: 18 TABLET | Refills: 0 | Status: SHIPPED | OUTPATIENT
Start: 2017-12-06 | End: 2017-12-11 | Stop reason: SDUPTHER

## 2017-12-06 NOTE — TELEPHONE ENCOUNTER
Medication Refill Request    Date of phone call: 12/6/17    Medication being requested: Oxycodone-apap 10 sig: q4hrs PRN  Qty: 18    Date of last visit: 11/28/17    Date of last refill: 11/28/17    SINAN up to date?: 11/27/17    Next Follow up?: 12/11/17    Any new pertinent information? (i.e, new medication allergies, new use of medications, change in patient's health or condition, non-compliance or inconsistency with prescribing agreement?): n/a

## 2017-12-11 ENCOUNTER — OFFICE VISIT (OUTPATIENT)
Dept: PAIN MEDICINE | Facility: CLINIC | Age: 47
End: 2017-12-11

## 2017-12-11 VITALS
WEIGHT: 171 LBS | TEMPERATURE: 98.3 F | SYSTOLIC BLOOD PRESSURE: 117 MMHG | DIASTOLIC BLOOD PRESSURE: 80 MMHG | HEART RATE: 80 BPM | OXYGEN SATURATION: 97 % | RESPIRATION RATE: 16 BRPM | HEIGHT: 69 IN | BODY MASS INDEX: 25.33 KG/M2

## 2017-12-11 DIAGNOSIS — M54.2 NECK PAIN: ICD-10-CM

## 2017-12-11 DIAGNOSIS — M50.20 CERVICAL DISC HERNIATION: ICD-10-CM

## 2017-12-11 DIAGNOSIS — G89.29 OTHER CHRONIC PAIN: Primary | ICD-10-CM

## 2017-12-11 PROCEDURE — 99213 OFFICE O/P EST LOW 20 MIN: CPT | Performed by: ANESTHESIOLOGY

## 2017-12-11 RX ORDER — OXYCODONE AND ACETAMINOPHEN 10; 325 MG/1; MG/1
1 TABLET ORAL EVERY 4 HOURS PRN
Qty: 126 TABLET | Refills: 0 | Status: SHIPPED | OUTPATIENT
Start: 2017-12-11 | End: 2017-12-27 | Stop reason: SDUPTHER

## 2017-12-11 NOTE — PROGRESS NOTES
CHIEF COMPLAINT  Pt continues to have no relief of neck pain since 10/31/17 cervical RF;  Pt had a cervical MRI on 11/28/17 at [facility]    Subjective   Dave Flynn is a 47 y.o. male  who presents to the office for follow-up.He has a history of chronic neck pain, which has worsened. Patient was diagnosed with new cervical disc herniation on 11-28-17. Was treated with MDP and Neurontin. Was also given a temporary prescription for Percocet from ER and then again from Flaquita John APRN. Was evaluated by Flaquita John on 11-28-17. Has an appointment with Dr. Martinez 12-13-17.      Neck Pain    This is a chronic problem. The current episode started more than 1 year ago. The problem occurs constantly. Progression since onset: worse since last office visit. The pain is associated with nothing. The pain is present in the left side, right side, midline and occipital region. The quality of the pain is described as aching and burning. The pain is at a severity of 8/10 (ranges from 4-9/10VAS). The pain is severe. The symptoms are aggravated by position and twisting. Associated symptoms include headaches, numbness (hands bilat.) and weakness (hands bilat.). Pertinent negatives include no chest pain or fever. He has tried NSAIDs, oral narcotics, neck support, muscle relaxants, ice and heat (cervical MBB--temporary significant relief) for the symptoms. The treatment provided mild relief.        MR SCAN OF THE CERVICAL SPINE WITHOUT AND WITH INTRAVENOUS CONTRAST  11-27-17      HISTORY: Previous multilevel fusion. Has severe neck pain.      FINDINGS:  The MR scan was performed as an emergency procedure with  sagittal and axial images and includes T1 images without and with  intravenous contrast. The following findings are present:  1. The patient has previous anterior fusion extending from C4 to C7 with  interbody grafts and anterior plate with screws. At C3-4 above the  fusion, there is a large broad-based disc herniation causing  flattening  deformity of the spinal cord. The T2 sequence shows some evidence of  myelomalacia and cord edema related to the cord compression at this  level. There is also slight bilateral foraminal encroachment with  lateral facet spurring, right greater than left.  2. At C2-3, there is a large right paracentral disc herniation extending  into the right neural foramen with very severe right foraminal  encroachment.  3. The cervicomedullary junction is normal in position. The remainder of  the spinal cord is unremarkable.  4. At C7-T1 below the fusion, there is a localized right paracentral  disc herniation causing localized flattening of the anterior margin of  the spinal cord on the right. There is no foraminal encroachment.      This report was finalized on 11/27/2017 4:18 PM by Dr. Forrest Estevez MD.    PEG Assessment   What number best describes your pain on average in the past week?9  What number best describes how, during the past week, pain has interfered with your enjoyment of life?9  What number best describes how, during the past week, pain has interfered with your general activity?  9    The following portions of the patient's history were reviewed and updated as appropriate: allergies, current medications, past family history, past medical history, past social history, past surgical history and problem list.    Review of Systems   Constitutional: Positive for activity change (decreased). Negative for appetite change, chills and fever.   Respiratory: Negative for apnea and shortness of breath.    Cardiovascular: Negative for chest pain.   Gastrointestinal: Negative for constipation, diarrhea, nausea and vomiting.   Genitourinary: Negative for difficulty urinating, dysuria and enuresis.   Musculoskeletal: Positive for neck pain.   Neurological: Positive for dizziness (episodic,since taking gabapentin), weakness (hands bilat.), light-headedness, numbness (hands bilat.) and headaches.  "  Psychiatric/Behavioral: Positive for sleep disturbance. Negative for confusion, hallucinations, self-injury and suicidal ideas. The patient is not nervous/anxious.        Vitals:    12/11/17 1540   BP: 117/80   Pulse: 80   Resp: 16   Temp: 98.3 °F (36.8 °C)   SpO2: 97%   Weight: 77.6 kg (171 lb)   Height: 176 cm (69.29\")   PainSc: 8  Comment: neck pain ranges from 4-9/10   PainLoc: Neck     Objective   Physical Exam        Assessment/Plan   Dave was seen today for neck pain.    Diagnoses and all orders for this visit:    Other chronic pain    Cervical disc herniation    Neck pain    Other orders  -     oxyCODONE-acetaminophen (PERCOCET)  MG per tablet; Take 1 tablet by mouth Every 4 (Four) Hours As Needed for Severe Pain .      --- The urine drug screen confirmation from 8-24-17 has been reviewed and the result is appropriate based on patient history and SINAN report  --- Reviewed cervical MRI.. See above.   --- Follow-up 3 wks  -- he will see Dr. Martinez on Wednesday         SINAN REPORT    As part of the patient's treatment plan, I am prescribing controlled substances. The patient has been made aware of appropriate use of such medications, including potential risk of somnolence, limited ability to drive and/or work safely, and the potential for dependence or overdose. It has also bee made clear that these medications are for use by this patient only, without concomitant use of alcohol or other substances unless prescribed.     Patient has completed prescribing agreement detailing terms of continued prescribing of controlled substances, including monitoring SINAN reports, urine drug screening, and pill counts if necessary. The patient is aware that inappropriate use will results in cessation of prescribing such medications.    SINAN report has been reviewed and scanned into the patient's chart.    Date of last SINAN : 12-7-17    History and physical exam exhibit continued safe and appropriate use " of controlled substances.      EMR Dragon/Transcription disclaimer:   Much of this encounter note is an electronic transcription/translation of spoken language to printed text. The electronic translation of spoken language may permit erroneous, or at times, nonsensical words or phrases to be inadvertently transcribed; Although I have reviewed the note for such errors, some may still exist.

## 2017-12-13 ENCOUNTER — OFFICE VISIT (OUTPATIENT)
Dept: NEUROSURGERY | Facility: CLINIC | Age: 47
End: 2017-12-13

## 2017-12-13 VITALS
HEIGHT: 69 IN | DIASTOLIC BLOOD PRESSURE: 83 MMHG | WEIGHT: 171 LBS | SYSTOLIC BLOOD PRESSURE: 123 MMHG | BODY MASS INDEX: 25.33 KG/M2 | HEART RATE: 79 BPM

## 2017-12-13 DIAGNOSIS — M50.31 DEGENERATION OF INTERVERTEBRAL DISC OF HIGH CERVICAL REGION: ICD-10-CM

## 2017-12-13 DIAGNOSIS — M54.2 NECK PAIN: Primary | ICD-10-CM

## 2017-12-13 DIAGNOSIS — M50.20 CERVICAL HERNIATED DISC: ICD-10-CM

## 2017-12-13 PROCEDURE — 99213 OFFICE O/P EST LOW 20 MIN: CPT | Performed by: NEUROLOGICAL SURGERY

## 2017-12-13 RX ORDER — TIZANIDINE 2 MG/1
2 TABLET ORAL EVERY 8 HOURS PRN
Qty: 90 TABLET | Refills: 3 | Status: SHIPPED | OUTPATIENT
Start: 2017-12-13 | End: 2018-04-19 | Stop reason: SDUPTHER

## 2017-12-13 RX ORDER — METHYLPREDNISOLONE 4 MG/1
TABLET ORAL
Qty: 21 TABLET | Refills: 0 | Status: SHIPPED | OUTPATIENT
Start: 2017-12-13 | End: 2017-12-27

## 2017-12-13 NOTE — PROGRESS NOTES
Subjective   Patient ID: Dave Flynn is a 47 y.o. male is here today for follow-up on neck pain.    At the patient's last visit he reported numbness in the fingers on the right hand and tingling in the fingers on the left hand. The patient was referred to pain management and ENT.    Today the patient reports that his neck pain has gotten worse. He states that the pain radiates into his shoulders. He has had one epidural and states that he has not gotten any relief.    He has seen the ENT since his last visit and states that he had a polyp removed.    Neck Pain    This is a chronic problem. The current episode started more than 1 month ago. The problem occurs daily. The problem has been gradually worsening. Associated symptoms include headaches, numbness and tingling.       The following portions of the patient's history were reviewed and updated as appropriate: allergies, current medications, past family history, past medical history, past social history, past surgical history and problem list.    Review of Systems   Musculoskeletal: Positive for neck pain.   Neurological: Positive for tingling, numbness and headaches.   All other systems reviewed and are negative.    I have not seen him in 4 months. He did go to see Dr. Cortez of ENT and had a vocal cord nodule removed and feels his voice is back to normal. In retrospect, then, his voice issues were related to that and not so much the retraction time of the surgery. He has been working with Dr. Barrientos and had a radiofrequency lesioning which actually hurt him more. He has been having mainly neck pain and a little bit of posterior shoulder pain, but no radiating arm pain. He went to the emergency room where he got a new MRI in late 11/2017, which I discussed with him. It did show adjacent level cervical disk disease and herniation with cord impingement, but no clinical myelopathy. He certainly has neck pain which is mechanical as well as some C4 pain which is  radicular, but he has got normal reflexes and he has good use of his hands. I do not think he needs surgery because of the concern of impending paralysis, but it might come to it because of increased pain. He has got a new job which is less physical and he does not feel like he is in a position that he can have another surgery which is understandable. The Medrol Dosepak he was given in the emergency room seemed to help and then he asked for another, which is not unreasonable. Will try some muscle relaxants and have him come back in 4 months. It would not surprise me if we had to do surgery at some point because of progressive pain and C4 radiculopathy. If that is the case, we could do it with an anterior approach using a diversions cage at C3-C4.       Objective   Physical Exam   Constitutional: He is oriented to person, place, and time. He appears well-developed and well-nourished.   HENT:   Head: Normocephalic and atraumatic.   Eyes: Conjunctivae and EOM are normal. Pupils are equal, round, and reactive to light.   Fundoscopic exam:       The right eye shows no papilledema. The right eye shows venous pulsations.        The left eye shows no papilledema. The left eye shows venous pulsations.   Neck: Carotid bruit is not present.   Neurological: He is oriented to person, place, and time. He has a normal Finger-Nose-Finger Test and a normal Heel to Shin Test. Gait normal.   Reflex Scores:       Tricep reflexes are 2+ on the right side and 2+ on the left side.       Bicep reflexes are 2+ on the right side and 2+ on the left side.       Brachioradialis reflexes are 2+ on the right side and 2+ on the left side.       Patellar reflexes are 2+ on the right side and 2+ on the left side.       Achilles reflexes are 2+ on the right side and 2+ on the left side.  Psychiatric: His speech is normal.     Neurologic Exam     Mental Status   Oriented to person, place, and time.   Registration of memory: Good recent and remote  memory.   Attention: normal. Concentration: normal.   Speech: speech is normal   Level of consciousness: alert  Knowledge: consistent with education.     Cranial Nerves     CN II   Visual fields full to confrontation.   Visual acuity: normal    CN III, IV, VI   Pupils are equal, round, and reactive to light.  Extraocular motions are normal.     CN V   Facial sensation intact.   Right corneal reflex: normal  Left corneal reflex: normal    CN VII   Facial expression full, symmetric.   Right facial weakness: none  Left facial weakness: none    CN VIII   Hearing: intact    CN IX, X   Palate: symmetric    CN XI   Right sternocleidomastoid strength: normal  Left sternocleidomastoid strength: normal    CN XII   Tongue: not atrophic  Tongue deviation: none    Motor Exam   Muscle bulk: normal  Right arm tone: normal  Left arm tone: normal  Right leg tone: normal  Left leg tone: normal    Strength   Strength 5/5 except as noted.     Sensory Exam   Light touch normal.     Gait, Coordination, and Reflexes     Gait  Gait: normal    Coordination   Finger to nose coordination: normal  Heel to shin coordination: normal    Reflexes   Right brachioradialis: 2+  Left brachioradialis: 2+  Right biceps: 2+  Left biceps: 2+  Right triceps: 2+  Left triceps: 2+  Right patellar: 2+  Left patellar: 2+  Right achilles: 2+  Left achilles: 2+  Right : 2+  Left : 2+      Assessment/Plan   Independent Review of Radiographic Studies:    The repeat MRI done 11/27/2017 was reviewed. He is fused from C4 to C7. He does have a herniated disk at C3-C4 with some bilateral root impingement and some central cord compression. There is even some discussion about some mild cord edema. I agree with the results.       Medical Decision Making:    Again, despite the herniated disk above his fusion at C3-C4, he is not myelopathic. He has mainly neck pain and some degree of C4 root compression. Both he and I would like to avoid another surgery,  particularly from the front. Will try another Medrol Dosepak, which seemed to help him from the emergency room. Will try muscle relaxants consisting of tizanidine and will come and see me in 4 months. If worse comes to worse, we can consider another ACDF at C3-C4 using a Divergence cage.       Dave was seen today for neck pain.    Diagnoses and all orders for this visit:    Neck pain    Cervical herniated disc    Degeneration of intervertebral disc of high cervical region    Other orders  -     tiZANidine (ZANAFLEX) 2 MG tablet; Take 1 tablet by mouth Every 8 (Eight) Hours As Needed for Muscle Spasms.  -     MethylPREDNISolone (MEDROL, RICARDA,) 4 MG tablet; Take as directed on package instructions.    Return in about 4 months (around 4/13/2018).

## 2017-12-27 ENCOUNTER — OFFICE VISIT (OUTPATIENT)
Dept: PAIN MEDICINE | Facility: CLINIC | Age: 47
End: 2017-12-27

## 2017-12-27 VITALS
BODY MASS INDEX: 26.07 KG/M2 | SYSTOLIC BLOOD PRESSURE: 127 MMHG | TEMPERATURE: 98.2 F | HEIGHT: 69 IN | HEART RATE: 80 BPM | DIASTOLIC BLOOD PRESSURE: 80 MMHG | RESPIRATION RATE: 16 BRPM | WEIGHT: 176 LBS | OXYGEN SATURATION: 97 %

## 2017-12-27 DIAGNOSIS — M47.812 FACET ARTHROPATHY, CERVICAL: ICD-10-CM

## 2017-12-27 DIAGNOSIS — M54.12 CERVICAL RADICULOPATHY: ICD-10-CM

## 2017-12-27 DIAGNOSIS — M50.31 DEGENERATION OF INTERVERTEBRAL DISC OF HIGH CERVICAL REGION: ICD-10-CM

## 2017-12-27 DIAGNOSIS — G89.29 OTHER CHRONIC PAIN: ICD-10-CM

## 2017-12-27 DIAGNOSIS — M50.320 DEGENERATION OF INTERVERTEBRAL DISC OF MID-CERVICAL REGION, UNSPECIFIED SPINAL LEVEL: Primary | ICD-10-CM

## 2017-12-27 DIAGNOSIS — M79.18 MYOFASCIAL PAIN SYNDROME, CERVICAL: ICD-10-CM

## 2017-12-27 DIAGNOSIS — M54.2 CERVICALGIA: ICD-10-CM

## 2017-12-27 PROCEDURE — 99214 OFFICE O/P EST MOD 30 MIN: CPT | Performed by: ANESTHESIOLOGY

## 2017-12-27 RX ORDER — OXYCODONE AND ACETAMINOPHEN 10; 325 MG/1; MG/1
1 TABLET ORAL EVERY 4 HOURS PRN
Qty: 126 TABLET | Refills: 0 | Status: SHIPPED | OUTPATIENT
Start: 2017-12-27 | End: 2018-02-14 | Stop reason: SDUPTHER

## 2017-12-27 RX ORDER — LIDOCAINE 50 MG/G
2 PATCH TOPICAL EVERY 24 HOURS
Qty: 60 PATCH | Refills: 0 | Status: SHIPPED | OUTPATIENT
Start: 2017-12-27

## 2017-12-27 NOTE — PROGRESS NOTES
CHIEF COMPLAINT  Pt is here to discuss treatment plan since being seen by Dr Martinez on 12/13/17.  Pt states has had no relief from 10/31/17 Cervical RF.    Subjective   Dave Flynn is a 47 y.o. male  who presents to the office for follow-up.He has a history of neck pain.      Neck Pain    This is a chronic problem. The current episode started more than 1 year ago. The problem occurs constantly. The problem has been unchanged. The pain is associated with nothing. The pain is present in the left side, right side, midline and occipital region. The quality of the pain is described as aching and burning. Pain scale: ranges from 4-9/10VAS. The pain is severe. The symptoms are aggravated by position and twisting. Associated symptoms include headaches, numbness (hands bilat.) and weakness (hands bilat.occasionally). Pertinent negatives include no chest pain or fever. He has tried NSAIDs, neck support, muscle relaxants, ice, heat, home exercises and acetaminophen (cervical MBB--temporary significant relief) for the symptoms. The treatment provided no relief (No relief after RF however).        PEG Assessment   What number best describes your pain on average in the past week?8  What number best describes how, during the past week, pain has interfered with your enjoyment of life?8  What number best describes how, during the past week, pain has interfered with your general activity?  8      The following portions of the patient's history were reviewed and updated as appropriate: allergies, current medications, past family history, past medical history, past social history, past surgical history and problem list.    Review of Systems   Constitutional: Positive for activity change (decreased). Negative for appetite change, chills and fever.   Respiratory: Negative for apnea and shortness of breath.    Cardiovascular: Negative for chest pain.   Gastrointestinal: Negative for constipation, diarrhea, nausea and vomiting.  "  Genitourinary: Negative for difficulty urinating, dysuria and enuresis.   Musculoskeletal: Positive for neck pain.   Neurological: Positive for weakness (hands bilat.occasionally), numbness (hands bilat.) and headaches. Negative for dizziness (episodic,since taking gabapentin) and light-headedness.   Psychiatric/Behavioral: Positive for sleep disturbance. Negative for confusion, hallucinations, self-injury and suicidal ideas. The patient is not nervous/anxious.          Vitals:    12/27/17 1430   BP: 127/80   Pulse: 80   Resp: 16   Temp: 98.2 °F (36.8 °C)   SpO2: 97%   Weight: 79.8 kg (176 lb)   Height: 176 cm (69.29\")   PainSc: 5  Comment: neck pain centrally ranges from 5-9/10   PainLoc: Neck         Objective   Physical Exam   Constitutional: He is oriented to person, place, and time. He appears well-developed and well-nourished.   HENT:   Head: Normocephalic and atraumatic.   Eyes: Conjunctivae are normal.   Neck: Trachea normal. Neck supple. Spinous process tenderness present. No muscular tenderness present. Decreased range of motion present.   Pulmonary/Chest: Effort normal.   Musculoskeletal:        Cervical back: He exhibits decreased range of motion, tenderness and pain. He exhibits no bony tenderness, no edema and no spasm.   Neurological: He is alert and oriented to person, place, and time. No cranial nerve deficit. Gait normal.   Reflex Scores:       Bicep reflexes are 1+ on the right side and 1+ on the left side.       Brachioradialis reflexes are 1+ on the right side and 1+ on the left side.  Pos spurling bilat which increases N/t in the hands     Psychiatric: He has a normal mood and affect.   Nursing note and vitals reviewed.          Assessment/Plan   Dave was seen today for neck pain.    Diagnoses and all orders for this visit:    Degeneration of intervertebral disc of mid-cervical region, unspecified spinal level    Degeneration of intervertebral disc of high cervical region    Other chronic " pain    Cervical radiculopathy  -     Case Request    Other orders  -     lidocaine (LIDODERM) 5 %; Place 2 patches on the skin Daily. Remove & Discard patch within 12 hours or as directed by MD  -     oxyCODONE-acetaminophen (PERCOCET)  MG per tablet; Take 1 tablet by mouth Every 4 (Four) Hours As Needed for Severe Pain .        --- Follow-up for procedure... Cervical epidural steroid injection, x2, 2 wks apart    -- acute treatment with Percocet    -- office in about a month (i.e. 2 wks after 2nd ABRAM)               SINAN REPORT    As part of the patient's treatment plan, I am prescribing controlled substances. The patient has been made aware of appropriate use of such medications, including potential risk of somnolence, limited ability to drive and/or work safely, and the potential for dependence or overdose. It has also bee made clear that these medications are for use by this patient only, without concomitant use of alcohol or other substances unless prescribed.     Patient has completed prescribing agreement detailing terms of continued prescribing of controlled substances, including monitoring SINAN reports, urine drug screening, and pill counts if necessary. The patient is aware that inappropriate use will results in cessation of prescribing such medications.    SINAN report has been reviewed and scanned into the patient's chart.    Date of last SINAN : as above    History and physical exam exhibit continued safe and appropriate use of controlled substances.      EMR Dragon/Transcription disclaimer:   Much of this encounter note is an electronic transcription/translation of spoken language to printed text. The electronic translation of spoken language may permit erroneous, or at times, nonsensical words or phrases to be inadvertently transcribed; Although I have reviewed the note for such errors, some may still exist.

## 2017-12-28 ENCOUNTER — PRIOR AUTHORIZATION (OUTPATIENT)
Dept: PAIN MEDICINE | Facility: CLINIC | Age: 47
End: 2017-12-28

## 2018-01-25 ENCOUNTER — OUTSIDE FACILITY SERVICE (OUTPATIENT)
Dept: PAIN MEDICINE | Facility: CLINIC | Age: 48
End: 2018-01-25

## 2018-01-25 ENCOUNTER — DOCUMENTATION (OUTPATIENT)
Dept: PAIN MEDICINE | Facility: CLINIC | Age: 48
End: 2018-01-25

## 2018-01-25 PROCEDURE — 62321 NJX INTERLAMINAR CRV/THRC: CPT | Performed by: ANESTHESIOLOGY

## 2018-01-29 NOTE — PROGRESS NOTES
Cervical Epidural Steroid Injection @ C3-C4  [facility]    PREOPERATIVE DIAGNOSIS:  Cervical Radiculopathy, Cervical Degenerative Disc Disease and Cervical Disc Displacement  POSTOPERATIVE DIAGNOSIS:  Same as preop diagnosis    PROCEDURE:   Cervical Epidural Steroid Injection, Therapeutic Translaminar Injection, with epidurogram, at  C3-C4 level    PRE-PROCEDURE DISCUSSION WITH PATIENT:    Risks and complications were discussed with the patient prior to starting the procedure and informed consent was obtained.  We discussed various topics including but not limited to bleeding, infection, injury, paralysis, nerve injury, dural puncture, coma, death, worsening of clinical picture, lack of pain relief, and postprocedural soreness.    SURGEON:  Toby Barrientos MD    REASON FOR PROCEDURE:   Degenerative changes are noted in the area., Radiating pattern of pain is likely consistent with degenerative changes in the area. and significant disc displacements at C23 and C34 are more c.w the painful area than the c7t1 herniation    SEDATION:  Versed 4mg & Fentanyl 100 mcg IV  ANESTHETIC:  Marcaine 0.25%  STEROID:   Methylprednisolone (DEPO MEDROL) 80mg/ml    DESCRIPTON OF PROCEDURE:    After obtaining informed consent, I.V. was started in the preop area.   The patient was taken to the operating room and placed in the prone position.  EKG, blood pressure, and pulse oximeter were monitored throughout, and sedation was provided as needed by the RN under my guidance. All pressure points were well padded.  The cervicothoracic spine area was prepped with Chloraprep and draped in a sterile fashion.  Under fluoroscopic guidance, the aforementioned interlaminar space was identified. Skin and subcutaneous tissues were anesthetized with 1% lidocaine in the middle of the space. A 20-gauge Tuohy needle was introduced through the skin and advanced to this interlaminar space and into the epidural space under fluoroscopic  guidance and verified with loss-of-resistance technique to air.  After confirming the position of the needle with the fluoroscope with all the views, and after aspiration was confirmed negative for blood and CSF, 1.5 mL of Omnipaque was injected.  After seeing appropriate epidurogram with lateral and PA views, a total of 3 cc solution was injected, consisting of 1cc of local anesthetic as above, with normal saline and injectable steroid as above.       ESTIMATED BLOOD LOSS:  <5 mL  SPECIMENS:  None    COMPLICATIONS:     No complications were noted., There was no indication of vascular uptake on live injection of contrast dye., There was no indication of intrathecal uptake on live injection of contrast dye. and There was indication that a dural puncture occurred.    TOLERANCE & DISCHARGE CONDITION:    The patient tolerated the procedure well.  The patient was transported to the recovery area without difficulties.  The patient was discharged to home under the care of family in stable and satisfactory condition.    PLAN OF CARE:  1. The patient was given our standard instruction sheet.  2. The patient will Repeat injection 2 wks.  3. The patient will resume all medications as per the medication reconciliation sheet.

## 2018-02-14 RX ORDER — OXYCODONE AND ACETAMINOPHEN 10; 325 MG/1; MG/1
1 TABLET ORAL EVERY 6 HOURS PRN
Qty: 84 TABLET | Refills: 0 | Status: SHIPPED | OUTPATIENT
Start: 2018-02-14 | End: 2018-04-04 | Stop reason: SDUPTHER

## 2018-02-14 NOTE — TELEPHONE ENCOUNTER
Medication Refill Request    Date of phone call: 2/14/18    Medication being requested: Oxycodone-apap 10 sig: q4hrs  Qty: 126    Date of last visit: 12/27/17    Date of last refill: 12/27/17    SINAN up to date?: 12/26/17    Next Follow up?: n/a    Any new pertinent information? (i.e, new medication allergies, new use of medications, change in patient's health or condition, non-compliance or inconsistency with prescribing agreement?): n/a

## 2018-02-28 ENCOUNTER — TELEPHONE (OUTPATIENT)
Dept: PAIN MEDICINE | Facility: CLINIC | Age: 48
End: 2018-02-28

## 2018-03-09 ENCOUNTER — OFFICE VISIT (OUTPATIENT)
Dept: PAIN MEDICINE | Facility: CLINIC | Age: 48
End: 2018-03-09

## 2018-03-09 VITALS
HEIGHT: 69 IN | OXYGEN SATURATION: 97 % | WEIGHT: 185.8 LBS | SYSTOLIC BLOOD PRESSURE: 142 MMHG | HEART RATE: 73 BPM | BODY MASS INDEX: 27.52 KG/M2 | DIASTOLIC BLOOD PRESSURE: 92 MMHG

## 2018-03-09 DIAGNOSIS — G89.29 OTHER CHRONIC PAIN: Primary | ICD-10-CM

## 2018-03-09 DIAGNOSIS — M50.30 DEGENERATIVE CERVICAL DISC: ICD-10-CM

## 2018-03-09 DIAGNOSIS — M50.20 CERVICAL DISC HERNIATION: ICD-10-CM

## 2018-03-09 PROCEDURE — 99213 OFFICE O/P EST LOW 20 MIN: CPT | Performed by: NURSE PRACTITIONER

## 2018-03-09 NOTE — PROGRESS NOTES
CHIEF COMPLAINT  Here for follow up for chronic neck pain; completed ABRAM on 1/25/18 by Dr. Toby Barrientos. He reports about 90% relief for approximately two weeks.     Subjective   Dave Flynn is a 48 y.o. male  who presents to the office for follow-up of procedure.  He completed a cervical BASSAM   on  1/25/2018 performed by Dr. Barrientos for management of neck pain. Patient reports 90% relief from the procedure X 2-3 weeks with some ongoing but waning benefit.  Unfortunately he was unable to repeat the procedure as was planned/ordered due to cost.  He is going to call about a payment plan.      No relief with cervical RFL.      New MRI 11/27/17 showed cervical disc herniation.  Wants to put off more neck surgery as long as possible.  History of cervical fusion 2/2016 with Dr. Martinez. He returns to Dr. Martinez again on April 11.      Has been taking Percocet over the past few months prescribed by Dr. Barrientos.  He reports he was able to stop after taking the epidural, but had to start back recently.  He is taking about 3/day of Percocet 10/325.  He does report at least moderate reduction in pain and helps him get through the work day.  He denies adverse reactions.      He works full time a     He also continues with Gabapentin 600 mg TID and Meloxicam 15 mg daily both of which are helpful     Neck Pain    This is a chronic problem. The current episode started more than 1 year ago. The problem occurs constantly. The problem has been unchanged. The pain is associated with nothing. The pain is present in the left side, right side, midline and occipital region. The quality of the pain is described as aching and burning. The pain is at a severity of 5/10. The pain is severe. The symptoms are aggravated by position and twisting. Associated symptoms include headaches, numbness (hands bilat.) and weakness (hands bilat.occasionally). Pertinent negatives include no chest pain, fever or photophobia. He has  "tried NSAIDs, neck support, muscle relaxants, ice, heat, home exercises and acetaminophen for the symptoms.      PEG Assessment   What number best describes your pain on average in the past week?6  What number best describes how, during the past week, pain has interfered with your enjoyment of life?6  What number best describes how, during the past week, pain has interfered with your general activity?  6    The following portions of the patient's history were reviewed and updated as appropriate: allergies, current medications, past family history, past medical history, past social history, past surgical history and problem list.    Review of Systems   Constitutional: Negative for fever.   HENT: Negative for ear pain, hearing loss and mouth sores.    Eyes: Negative for photophobia and pain.   Respiratory: Negative for cough and choking.    Cardiovascular: Negative for chest pain.   Gastrointestinal: Negative for abdominal pain, constipation and diarrhea.   Genitourinary: Negative for dysuria, flank pain and hematuria.   Musculoskeletal: Positive for neck pain and neck stiffness. Negative for back pain.   Neurological: Positive for weakness (hands bilat.occasionally), numbness (hands bilat.) and headaches. Negative for dizziness, seizures and light-headedness.   Psychiatric/Behavioral: Negative for confusion, hallucinations and self-injury.     Vitals:    03/09/18 1507   BP: 142/92   BP Location: Left arm   Patient Position: Sitting   Pulse: 73   SpO2: 97%   Weight: 84.3 kg (185 lb 12.8 oz)   Height: 175.3 cm (69\")   PainSc:   5   PainLoc: Neck     Objective   Physical Exam   Constitutional: He is oriented to person, place, and time. He appears well-developed and well-nourished.   HENT:   Head: Normocephalic and atraumatic.   Eyes: Conjunctivae are normal.   Neck: Trachea normal. Neck supple. No muscular tenderness present. Decreased range of motion present.   Pulmonary/Chest: Effort normal.   Musculoskeletal:        " Cervical back: He exhibits decreased range of motion, tenderness and pain. He exhibits no bony tenderness, no edema and no spasm.   Neurological: He is alert and oriented to person, place, and time. No cranial nerve deficit. Gait normal.   Reflex Scores:       Tricep reflexes are 2+ on the right side and 2+ on the left side.       Bicep reflexes are 2+ on the right side and 2+ on the left side.       Brachioradialis reflexes are 2+ on the right side and 2+ on the left side.  Psychiatric: He has a normal mood and affect.   Nursing note and vitals reviewed.    Assessment/Plan   Diagnoses and all orders for this visit:    Other chronic pain    Cervical disc herniation    Degenerative cervical disc      --- Repeat ABRAM   --- F/u with neurosurgery as scheduled  --- does not need refill of Percocet today. Discussed that this is not part of the long term plan of care and he agrees.  If any future refills requested we will need to update the UDS and obtain a prescribing agreement.  he does have a significant history of cervical fusion, cervical disc herniation and demonstrates moderate improvement with multimodal therapy including opioid therapy, which allows him to engage in ADLs and family activities and continue to work full time. The continuation of opioid therapy is therefore not contraindicated. We will however respect the March 2016 CDC Guidelines and will plan to avoid overexposure to opioids and avoid dose escalation.  --- Follow-up after procedure/6 weeks         SINAN REPORT  SINAN report has been reviewed and scanned into the patient's chart.    Date of last SINAN : 3/8/2018    EMR Dragon/Transcription disclaimer:   Much of this encounter note is an electronic transcription/translation of spoken language to printed text. The electronic translation of spoken language may permit erroneous, or at times, nonsensical words or phrases to be inadvertently transcribed; Although I have reviewed the note for such  errors, some may still exist.

## 2018-03-13 ENCOUNTER — OUTSIDE FACILITY SERVICE (OUTPATIENT)
Dept: PAIN MEDICINE | Facility: CLINIC | Age: 48
End: 2018-03-13

## 2018-03-13 ENCOUNTER — DOCUMENTATION (OUTPATIENT)
Dept: PAIN MEDICINE | Facility: CLINIC | Age: 48
End: 2018-03-13

## 2018-03-13 PROCEDURE — 62321 NJX INTERLAMINAR CRV/THRC: CPT | Performed by: ANESTHESIOLOGY

## 2018-03-14 NOTE — PROGRESS NOTES
Cervical Epidural Steroid Injection @ C3-C4  [facility]    PREOPERATIVE DIAGNOSIS:  Cervical Degenerative Disc Disease, Cervical Disc Displacement, Other Chronic Pain and Right Cervical Radiculopathy  POSTOPERATIVE DIAGNOSIS:  Same as preop diagnosis    PROCEDURE:   Cervical Epidural Steroid Injection, Therapeutic Translaminar Injection, with epidurogram, at  C3-C4 level    PRE-PROCEDURE DISCUSSION WITH PATIENT:    Risks and complications were discussed with the patient prior to starting the procedure and informed consent was obtained.  We discussed various topics including but not limited to bleeding, infection, injury, paralysis, nerve injury, dural puncture, coma, death, worsening of clinical picture, lack of pain relief, and postprocedural soreness.    SURGEON:  Toby Barrientos MD    REASON FOR PROCEDURE:   Making some clinical improvement after the previous procedure., Degenerative changes are noted in the area. and Stenotic area is noted, and is likely contributing to this chronic &/or recurrent pain.     SEDATION:  Versed 6mg IV  ANESTHETIC:  Marcaine 0.25%  STEROID:   Methylprednisolone (DEPO MEDROL) 80mg/ml    DESCRIPTON OF PROCEDURE:    After obtaining informed consent, I.V. was started in the preop area.   The patient was taken to the operating room and placed in the prone position.  EKG, blood pressure, and pulse oximeter were monitored throughout, and sedation was provided as needed by the RN under my guidance. All pressure points were well padded.  The cervicothoracic spine area was prepped with Chloraprep and draped in a sterile fashion.  Under fluoroscopic guidance, the aforementioned interlaminar space was identified. Skin and subcutaneous tissues were anesthetized with 1% lidocaine in the middle of the space. A 20-gauge Tuohy needle was introduced through the skin and advanced to this interlaminar space and into the epidural space under fluoroscopic guidance and verified with  loss-of-resistance technique to air.  After confirming the position of the needle with the fluoroscope with all the views, and after aspiration was confirmed negative for blood and CSF, 1.5 mL of Omnipaque was injected.  After seeing appropriate epidurogram with lateral and PA views, a total of 3 cc solution was injected, consisting of 1cc of local anesthetic as above, with normal saline and injectable steroid as above.       ESTIMATED BLOOD LOSS:  <5 mL  SPECIMENS:  None    COMPLICATIONS:     No complications were noted. and There was no indication of intrathecal uptake on live injection of contrast dye.    TOLERANCE & DISCHARGE CONDITION:    The patient tolerated the procedure well.  The patient was transported to the recovery area without difficulties.  The patient was discharged to home under the care of family in stable and satisfactory condition.    PLAN OF CARE:  1. The patient was given our standard instruction sheet.  2. The patient will Plan for office visit in one month with neurosurgery..  3. The patient will resume all medications as per the medication reconciliation sheet.

## 2018-04-02 NOTE — TELEPHONE ENCOUNTER
Patient called and wanted to know about having another injection. Should he have another one or an office visit.?

## 2018-04-02 NOTE — TELEPHONE ENCOUNTER
Medication Refill Request    Date of phone call: 4/2/18    Medication being requested: Oxycodone-apap 10 sig: q6hrs  Qty: 84    Date of last visit: 3/9/18    Date of last refill: 2/14/18    SINAN up to date?: 3/8/18    Next Follow up?: n/a    Any new pertinent information? (i.e, new medication allergies, new use of medications, change in patient's health or condition, non-compliance or inconsistency with prescribing agreement?): n/a

## 2018-04-03 RX ORDER — OXYCODONE AND ACETAMINOPHEN 10; 325 MG/1; MG/1
1 TABLET ORAL EVERY 6 HOURS PRN
Qty: 84 TABLET | Refills: 0 | OUTPATIENT
Start: 2018-04-03

## 2018-04-03 NOTE — TELEPHONE ENCOUNTER
He just needs to schedule an office visit.  We can refill his medication at that time, he needs a prescribing agreement and UDS before we can fill more pain medication.  He can repeat ABRAM X 1, but will then be done after 7/25.  We can discuss in the office.

## 2018-04-03 NOTE — TELEPHONE ENCOUNTER
Spoke to patient and he states he wanted to see Dr. Barrientos and has to have the latest appointment of the day, I offered him 4/25/18 @ 3:40 pm with Dr. Barrientos and he states he cannot wait that long because he is waking up with severe headaches.    I offered him an appointment tomorrow 4/4/18 @ 3:40 pm with Barbara Jane and he took that appointment.

## 2018-04-04 ENCOUNTER — OFFICE VISIT (OUTPATIENT)
Dept: PAIN MEDICINE | Facility: CLINIC | Age: 48
End: 2018-04-04

## 2018-04-04 VITALS
BODY MASS INDEX: 26.22 KG/M2 | TEMPERATURE: 97.7 F | HEART RATE: 93 BPM | OXYGEN SATURATION: 99 % | SYSTOLIC BLOOD PRESSURE: 133 MMHG | RESPIRATION RATE: 16 BRPM | DIASTOLIC BLOOD PRESSURE: 91 MMHG | WEIGHT: 177 LBS | HEIGHT: 69 IN

## 2018-04-04 DIAGNOSIS — M54.12 CERVICAL RADICULOPATHY: ICD-10-CM

## 2018-04-04 DIAGNOSIS — G89.29 OTHER CHRONIC PAIN: Primary | ICD-10-CM

## 2018-04-04 DIAGNOSIS — Z79.891 ENCOUNTER FOR MONITORING OPIOID MAINTENANCE THERAPY: ICD-10-CM

## 2018-04-04 DIAGNOSIS — M50.20 CERVICAL DISC HERNIATION: ICD-10-CM

## 2018-04-04 DIAGNOSIS — Z51.81 ENCOUNTER FOR MONITORING OPIOID MAINTENANCE THERAPY: ICD-10-CM

## 2018-04-04 PROCEDURE — 80305 DRUG TEST PRSMV DIR OPT OBS: CPT | Performed by: NURSE PRACTITIONER

## 2018-04-04 PROCEDURE — 99214 OFFICE O/P EST MOD 30 MIN: CPT | Performed by: NURSE PRACTITIONER

## 2018-04-04 RX ORDER — OXYCODONE AND ACETAMINOPHEN 10; 325 MG/1; MG/1
1 TABLET ORAL EVERY 8 HOURS PRN
Qty: 90 TABLET | Refills: 0 | Status: SHIPPED | OUTPATIENT
Start: 2018-04-04 | End: 2018-05-03 | Stop reason: SDUPTHER

## 2018-04-04 NOTE — PROGRESS NOTES
CHIEF COMPLAINT  Pt states neck pain has significantly increased as a result of having no percocet for 4 days.    Subjective   Dave Flynn is a 48 y.o. male  who presents to the office for follow-up.He has a history of chronic neck pain.    He completed a ABRAM on 3/13/2018 with Dr. Barrientos. He reports significant relief for only 1.5 weeks.  He reported similar short term response following the 1/25/18 ABRAM.      No relief with cervical RFL 10/31/2017.       MRI 11/27/17 showed cervical disc herniation.  Wants to put off more neck surgery as long as possible.  History of cervical fusion 2/2016 with Dr. Martinez. He returns to Dr. Martinez again on April 11.       Has been taking Percocet over the past few months prescribed by Dr. Barrientos.  He is taking about 3/day of Percocet 10/325.  He does report at least moderate reduction in pain and helps him get through the work day.  He denies adverse reactions.  He also continues with Gabapentin 600 mg TID and Meloxicam 15 mg daily both of which are helpful      He works full time a     Patient reports that he and his wife have discussed and they cannot afford more surgery until the first of the year, he would like to maintain until then if possible. They are still paying off the previous surgery as well as multiple interventions.       Neck Pain    This is a chronic problem. The current episode started more than 1 year ago. The problem occurs constantly. The problem has been unchanged. The pain is associated with nothing. The pain is present in the left side, right side, midline and occipital region. The quality of the pain is described as aching and burning. The pain is at a severity of 8/10. The pain is severe. The symptoms are aggravated by position and twisting. Associated symptoms include headaches, numbness (hands bilat.) and weakness (hands bilat.occasionally). Pertinent negatives include no chest pain, fever or photophobia. He has tried  NSAIDs, neck support, muscle relaxants, ice, heat, home exercises and acetaminophen for the symptoms.      PEG Assessment   What number best describes your pain on average in the past week?8  What number best describes how, during the past week, pain has interfered with your enjoyment of life?8  What number best describes how, during the past week, pain has interfered with your general activity?  8    MRI REPORT REVIEWED:    MR SCAN OF THE CERVICAL SPINE WITHOUT AND WITH INTRAVENOUS CONTRAST     HISTORY: Previous multilevel fusion. Has severe neck pain.     FINDINGS:  The MR scan was performed as an emergency procedure with  sagittal and axial images and includes T1 images without and with  intravenous contrast. The following findings are present:  1. The patient has previous anterior fusion extending from C4 to C7 with  interbody grafts and anterior plate with screws. At C3-4 above the  fusion, there is a large broad-based disc herniation causing flattening  deformity of the spinal cord. The T2 sequence shows some evidence of  myelomalacia and cord edema related to the cord compression at this  level. There is also slight bilateral foraminal encroachment with  lateral facet spurring, right greater than left.  2. At C2-3, there is a large right paracentral disc herniation extending  into the right neural foramen with very severe right foraminal  encroachment.  3. The cervicomedullary junction is normal in position. The remainder of  the spinal cord is unremarkable.  4. At C7-T1 below the fusion, there is a localized right paracentral  disc herniation causing localized flattening of the anterior margin of  the spinal cord on the right. There is no foraminal encroachment.     This report was finalized on 11/27/2017 4:18 PM by Dr. Forrest Estevez MD.       [facility] [id number].I.R.[id number]. Score: Patient Selection for Chronic Opioid Analgesia   For each factor, rate the patient’s score from 1-3 based on the explanations in the right  hand column.       Diagnosis:    2 1 = Benign chronic condition with minimal objective findings or no definite medical diagnosis. Examples: fibromyalgia, migraine headaches, nonspecific back pain.   2 = Slowly progressive condition concordant with moderate pain, or fixed condition with moderate objective findings. Examples: failed back surgery syndrome, back pain with moderate degenerative changes, neuropathic pain.   3 = Advanced condition concordant with severe pain with objective findings. Examples: severe ischemic vascular disease, advanced neuropathy, severe spinal stenosis.    Intractability:    3 1 = Few therapies have been tried and the patient takes a passive role in his/her pain management process.   2 = Most customary treatments have been tried but the patient is not fully engaged in the pain management process, or barriers prevent (insurance, transportation, medical illness).   3 = Patient fully engaged in a spectrum of appropriate treatments but with inadequate response.    Risk:  (R = Total of P + C + R + S below)    Psychological:     3 1 = Serious personality dysfunction or mental illness interfering with care.   Example: personality disorder, severe affective disorder, significant personality issues.   2 = Personality or mental health interferes moderately. Example: depression or anxiety disorder.   3 = Good communication with clinic. No significant personality dysfunction or mental illness.    Chemical Health:     3 1 = Active or very recent use of illicit drugs, excessive alcohol, or prescription drug abuse.   2 = Chemical coper (uses medications to cope with stress) or history of CD in remission.   3 = No CD history. Not drug-focused or chemically reliant.      Reliability:     3 1 = History of numerous problems: medication misuse, missed appointments, rarely follows through.   2 = Occasional difficulties with compliance, but generally reliable.   3 = Highly reliable patient with meds,  appointments & treatment.    Social Support:     3 1 = Life in chaos. Little family support and few close relationships. Loss of most normal life roles.   2 = Reduction in some relationships and life roles.   3 = Supportive family/close relationships. Involved in work or school and no social isolation.    Efficacy score:    3 1 = Poor function or minimal pain relief despite moderate to high doses.   2 = Moderate benefit with function improved in a number of ways (or insufficient info - hasn’t tried opioid yet or very low doses or too short of a trial).   3 = Good improvement in pain and function and quality of life with stable doses over time.           20  Total score = D + I + R + E     Score 7-13: Not a suitable candidate for long-term opioid analgesia   Score 14-21: May be a candidate for long-term opioid analgesia     (Source: [facility] © 2005.)        The following portions of the patient's history were reviewed and updated as appropriate: allergies, current medications, past family history, past medical history, past social history, past surgical history and problem list.    Review of Systems   Constitutional: Positive for activity change (restricted). Negative for appetite change and fever.   HENT: Negative for ear pain, hearing loss and mouth sores.    Eyes: Negative for photophobia and pain.   Respiratory: Negative for apnea, cough, choking and shortness of breath.    Cardiovascular: Negative for chest pain.   Gastrointestinal: Negative for abdominal pain, constipation, diarrhea and nausea.   Genitourinary: Negative for difficulty urinating, dysuria, flank pain and hematuria.   Musculoskeletal: Positive for neck pain and neck stiffness. Negative for back pain.   Neurological: Positive for dizziness (gabapentin?), weakness (hands bilat.occasionally), numbness (hands bilat.) and headaches. Negative for seizures and light-headedness.   Psychiatric/Behavioral:  "Positive for sleep disturbance. Negative for confusion, hallucinations, self-injury and suicidal ideas.       Vitals:    04/04/18 1529   BP: 133/91   Pulse: 93   Resp: 16   Temp: 97.7 °F (36.5 °C)   SpO2: 99%   Weight: 80.3 kg (177 lb)   Height: 175.3 cm (69.02\")   PainSc: 8  Comment: neck pain ranges from 6-8/10   PainLoc: Neck     Objective   Physical Exam   Constitutional: He is oriented to person, place, and time. He appears well-developed and well-nourished.   HENT:   Head: Normocephalic and atraumatic.   Eyes: Conjunctivae are normal.   Neck: Trachea normal. Neck supple. No muscular tenderness present. Decreased range of motion present.   Cardiovascular: Normal rate.    Pulmonary/Chest: Effort normal. No respiratory distress.   Musculoskeletal:        Cervical back: He exhibits decreased range of motion, tenderness and pain. He exhibits no bony tenderness, no edema and no spasm.   Neurological: He is alert and oriented to person, place, and time. No cranial nerve deficit. Gait normal.   Reflex Scores:       Tricep reflexes are 2+ on the right side and 2+ on the left side.       Bicep reflexes are 2+ on the right side and 2+ on the left side.       Brachioradialis reflexes are 2+ on the right side and 2+ on the left side.  Psychiatric: He has a normal mood and affect.   Nursing note and vitals reviewed.    Assessment/Plan   Dave was seen today for neck pain.    Diagnoses and all orders for this visit:    Other chronic pain    Cervical disc herniation    Cervical radiculopathy    Encounter for monitoring opioid maintenance therapy    Other orders  -     oxyCODONE-acetaminophen (PERCOCET)  MG per tablet; Take 1 tablet by mouth Every 8 (Eight) Hours As Needed for Severe Pain  (note change is frequency).      --- Routine UDS in office today as part of monitoring requirements for controlled substances.  The specimen was viewed and the immunoassay result reviewed and is negative across the board (reports that " he ran out 4 days ago, last refill on [facility] was #84 on 3/2/18, this is consistent with patient report).  This specimen will be sent to [facility] laboratory for confirmation.     --- Refill Percocet. Patient appears stable with current regimen. No adverse effects. Regarding continuation of opioids, there is no evidence of aberrant behavior or any red flags.  The patient continues with appropriate response to opioid therapy. ADL's remain intact by self.   --- The patient has signed a copy of our prescribing agreement (initiation of therapy).  The has stated both verbal and written understanding that prescription pain medication may be stopped if - pain does not significantly decrease and/or function increase, there is evidence of diverting medication, if He obtained controlled substances from another licensed practitioner without my knowledge and approval, if I feel that it is in the patient's best interest, if He exhibits any aggressive behavior to any provider or staff member in this office.  The patient agrees to only use the medication exactly as prescribed, to fill controlled substances at the same pharmacy, to keep medication in the original container, and to store controlled substances in a locked cabinet or other secure storage unit. The patient agrees to notify the office immediately if medication is lost or stolen and will be asked to produce an official police report prior to replacing/continuing lost/stolen controlled substances.  The patient understands that there will be routine monitoring including urine drug screens, pill counts, and review of pharmacy report.  The patient understands that He may be asked to submit to random drug screen or pill count. The patient will not seek early refills.  The patient will not use illegal street drugs while receiving controlled substances from this practice.  The patient understands that failure to adhere with this agreement may result in cessation of therapy with  controlled substance prescribing.     --- Patient does not wish to continue opioid therapy as part of long term plan of care.  he does however have a significant history of cervical disc herniation, previous cervical fusion and demonstrates moderate improvement with multimodal therapy including opioid therapy, which allows him to engage in ADLs and family activities. The continuation of opioid therapy is therefore not contraindicated. We will however respect the March 2016 CDC Guidelines and will plan to avoid overexposure to opioids and avoid dose escalation.  --- F/u with Dr. Martinez as scheduled  --- Could consider third cervical BASSAM   --- Continue Gabapentin, Meloxicam. Refills not needed today.   --- Follow-up 1 month          [facility] REPORT    As part of the patient's treatment plan, I am prescribing controlled substances. The patient has been made aware of appropriate use of such medications, including potential risk of somnolence, limited ability to drive and/or work safely, and the potential for dependence or overdose. It has also bee made clear that these medications are for use by this patient only, without concomitant use of alcohol or other substances unless prescribed.     Patient has completed prescribing agreement detailing terms of continued prescribing of controlled substances, including monitoring [facility] reports, urine drug screening, and pill counts if necessary. The patient is aware that inappropriate use will results in cessation of prescribing such medications.    [facility] report has been reviewed and scanned into the patient's chart.    Date of last [facility] : 4/3/2018    History and physical exam exhibit continued safe and appropriate use of controlled substances.    EMR Dragon/Transcription disclaimer:   Much of this encounter note is an electronic transcription/translation of spoken language to printed text. The electronic translation of spoken language may permit erroneous, or at times,  nonsensical words or phrases to be inadvertently transcribed; Although I have reviewed the note for such errors, some may still exist.

## 2018-04-09 ENCOUNTER — RESULTS ENCOUNTER (OUTPATIENT)
Dept: PAIN MEDICINE | Facility: CLINIC | Age: 48
End: 2018-04-09

## 2018-04-09 DIAGNOSIS — M54.12 CERVICAL RADICULOPATHY: ICD-10-CM

## 2018-04-09 DIAGNOSIS — Z51.81 ENCOUNTER FOR MONITORING OPIOID MAINTENANCE THERAPY: ICD-10-CM

## 2018-04-09 DIAGNOSIS — G89.29 OTHER CHRONIC PAIN: ICD-10-CM

## 2018-04-09 DIAGNOSIS — M50.20 CERVICAL DISC HERNIATION: ICD-10-CM

## 2018-04-09 DIAGNOSIS — Z79.891 ENCOUNTER FOR MONITORING OPIOID MAINTENANCE THERAPY: ICD-10-CM

## 2018-04-18 RX ORDER — SILDENAFIL 100 MG/1
TABLET, FILM COATED ORAL
Refills: 5 | COMMUNITY
Start: 2018-03-18 | End: 2018-05-04

## 2018-04-18 NOTE — PROGRESS NOTES
Subjective   Patient ID: Dave Flynn is a 48 y.o. male is here today for follow-up on neck pain.    At the last visit the patient reported worsening neck pain that radiates into his shoulder. He reported not getting any relief from the epidural. The patient was prescribed Tizanidine 2 mg PRN and MDP at the last visit.    Today the patient reports that he has had some improvement in his symptoms after taking the MDP. He still has some neck pain that radiates into the right shoulder.    Neck Pain    This is a chronic problem. The current episode started more than 1 month ago. The problem occurs daily. The problem has been gradually improving. The pain is associated with nothing. Associated symptoms include headaches, numbness and tingling.       The following portions of the patient's history were reviewed and updated as appropriate: allergies, current medications, past family history, past medical history, past social history, past surgical history and problem list.    Review of Systems   Musculoskeletal: Positive for neck pain.   Neurological: Positive for tingling, numbness and headaches.   All other systems reviewed and are negative.    It's been 4 months since I've seen him.  He's been having more trouble with his hands.  He had a previous carpal tunnel release on the right but is having some numbness and tingling in his left hand as well.  He apparently went to see Dr. Virk at Dr. Barrientos's request and who gave him some injections into his wrists.  They helped a bit but the effect wore off.    He continues to have neck pain and right posterior shoulder pain. He is not myelopathic. He is radiculopathic. I still think this is from the C3-C4 level. He had quite a bit of voice problems after the surgery that did resolve after the nodule was removed from his vocal cords, but if we were to consider another surgery, I would be reluctant to do it from the front. I had mentioned this in a previous note. It would  probably be better to do it posteriorly if we were going to do it all, but he still does not feel quite ready for another surgery, and I was trying to discourage it if at all possible. We will focus on the hands since he has been having some numbness and tingling. He had a previous carpal tunnel release on the right in 2008. He has a positive Tinel sign bilaterally. He will be sent to the hand surgeons, and we will reassess in 4 months.        Objective   Physical Exam   Constitutional: He is oriented to person, place, and time. He appears well-developed and well-nourished.   HENT:   Head: Normocephalic and atraumatic.   Eyes: Conjunctivae and EOM are normal. Pupils are equal, round, and reactive to light.   Fundoscopic exam:       The right eye shows no papilledema. The right eye shows venous pulsations.        The left eye shows no papilledema. The left eye shows venous pulsations.   Neck: Carotid bruit is not present.   Neurological: He is oriented to person, place, and time. He has a normal Finger-Nose-Finger Test and a normal Heel to Shin Test. Gait normal.   Reflex Scores:       Tricep reflexes are 2+ on the right side and 2+ on the left side.       Bicep reflexes are 2+ on the right side and 2+ on the left side.       Brachioradialis reflexes are 2+ on the right side and 2+ on the left side.       Patellar reflexes are 2+ on the right side and 2+ on the left side.       Achilles reflexes are 2+ on the right side and 2+ on the left side.  Psychiatric: His speech is normal.     Neurologic Exam     Mental Status   Oriented to person, place, and time.   Registration of memory: Good recent and remote memory.   Attention: normal. Concentration: normal.   Speech: speech is normal   Level of consciousness: alert  Knowledge: consistent with education.     Cranial Nerves     CN II   Visual fields full to confrontation.   Visual acuity: normal    CN III, IV, VI   Pupils are equal, round, and reactive to  light.  Extraocular motions are normal.     CN V   Facial sensation intact.   Right corneal reflex: normal  Left corneal reflex: normal    CN VII   Facial expression full, symmetric.   Right facial weakness: none  Left facial weakness: none    CN VIII   Hearing: intact    CN IX, X   Palate: symmetric    CN XI   Right sternocleidomastoid strength: normal  Left sternocleidomastoid strength: normal    CN XII   Tongue: not atrophic  Tongue deviation: none    Motor Exam   Muscle bulk: normal  Right arm tone: normal  Left arm tone: normal  Right leg tone: normal  Left leg tone: normal    Strength   Strength 5/5 except as noted.     Sensory Exam   Light touch normal.     Gait, Coordination, and Reflexes     Gait  Gait: normal    Coordination   Finger to nose coordination: normal  Heel to shin coordination: normal    Reflexes   Right brachioradialis: 2+  Left brachioradialis: 2+  Right biceps: 2+  Left biceps: 2+  Right triceps: 2+  Left triceps: 2+  Right patellar: 2+  Left patellar: 2+  Right achilles: 2+  Left achilles: 2+  Right : 2+  Left : 2+      Assessment/Plan   Independent Review of Radiographic Studies:    Reviewed the cervical MRI done 11/27/17.  He is fused from C4 to C7.  He has adjacent level cervical disc herniation at C3-C4 with bilateral root impingement and some central cord compression.  Agree with the report.      Medical Decision Making:    We'll still try to stay way from reoperative neck surgery.  I'll send him to the hand surgeons for an evaluation of his bilateral carpal tunnel syndrome and reassess in 4 months.  I refilled his Zanaflex and gabapentin.      Dave was seen today for neck pain.    Diagnoses and all orders for this visit:    Degeneration of intervertebral disc of high cervical region    Cervical herniated disc    Bilateral carpal tunnel syndrome  -     Ambulatory Referral to Hand Surgery    Other orders  -     tiZANidine (ZANAFLEX) 2 MG tablet; Take 1 tablet by mouth Every 8  (Eight) Hours As Needed for Muscle Spasms.  -     gabapentin (NEURONTIN) 600 MG tablet; Take 1 tablet by mouth 3 (Three) Times a Day.      Return in about 4 months (around 8/19/2018).

## 2018-04-19 ENCOUNTER — OFFICE VISIT (OUTPATIENT)
Dept: NEUROSURGERY | Facility: CLINIC | Age: 48
End: 2018-04-19

## 2018-04-19 VITALS
HEART RATE: 91 BPM | DIASTOLIC BLOOD PRESSURE: 79 MMHG | HEIGHT: 69 IN | WEIGHT: 177 LBS | BODY MASS INDEX: 26.22 KG/M2 | SYSTOLIC BLOOD PRESSURE: 118 MMHG

## 2018-04-19 DIAGNOSIS — M50.20 CERVICAL HERNIATED DISC: ICD-10-CM

## 2018-04-19 DIAGNOSIS — M50.31 DEGENERATION OF INTERVERTEBRAL DISC OF HIGH CERVICAL REGION: Primary | ICD-10-CM

## 2018-04-19 DIAGNOSIS — G56.03 BILATERAL CARPAL TUNNEL SYNDROME: ICD-10-CM

## 2018-04-19 PROCEDURE — 99214 OFFICE O/P EST MOD 30 MIN: CPT | Performed by: NEUROLOGICAL SURGERY

## 2018-04-19 RX ORDER — TIZANIDINE 2 MG/1
2 TABLET ORAL EVERY 8 HOURS PRN
Qty: 90 TABLET | Refills: 5 | Status: SHIPPED | OUTPATIENT
Start: 2018-04-19 | End: 2018-09-28 | Stop reason: HOSPADM

## 2018-04-19 RX ORDER — GABAPENTIN 600 MG/1
600 TABLET ORAL 3 TIMES DAILY
Qty: 90 TABLET | Refills: 5 | Status: SHIPPED | OUTPATIENT
Start: 2018-04-19 | End: 2018-11-21

## 2018-05-03 RX ORDER — OXYCODONE AND ACETAMINOPHEN 10; 325 MG/1; MG/1
1 TABLET ORAL EVERY 8 HOURS PRN
Qty: 90 TABLET | Refills: 0 | Status: SHIPPED | OUTPATIENT
Start: 2018-05-03 | End: 2018-05-31 | Stop reason: SDUPTHER

## 2018-05-04 ENCOUNTER — OFFICE VISIT (OUTPATIENT)
Dept: PAIN MEDICINE | Facility: CLINIC | Age: 48
End: 2018-05-04

## 2018-05-04 VITALS
WEIGHT: 178 LBS | TEMPERATURE: 98.4 F | BODY MASS INDEX: 26.36 KG/M2 | RESPIRATION RATE: 16 BRPM | DIASTOLIC BLOOD PRESSURE: 90 MMHG | HEART RATE: 83 BPM | HEIGHT: 69 IN | SYSTOLIC BLOOD PRESSURE: 151 MMHG | OXYGEN SATURATION: 98 %

## 2018-05-04 DIAGNOSIS — M54.12 CERVICAL RADICULOPATHY: ICD-10-CM

## 2018-05-04 DIAGNOSIS — Z79.891 ENCOUNTER FOR MONITORING OPIOID MAINTENANCE THERAPY: ICD-10-CM

## 2018-05-04 DIAGNOSIS — Z51.81 ENCOUNTER FOR MONITORING OPIOID MAINTENANCE THERAPY: ICD-10-CM

## 2018-05-04 DIAGNOSIS — G89.29 OTHER CHRONIC PAIN: Primary | ICD-10-CM

## 2018-05-04 DIAGNOSIS — M50.20 CERVICAL DISC HERNIATION: ICD-10-CM

## 2018-05-04 PROCEDURE — 99213 OFFICE O/P EST LOW 20 MIN: CPT | Performed by: NURSE PRACTITIONER

## 2018-05-04 RX ORDER — ZOLPIDEM TARTRATE 10 MG/1
10 TABLET ORAL NIGHTLY PRN
COMMUNITY
End: 2019-08-01

## 2018-05-04 NOTE — PROGRESS NOTES
CHIEF COMPLAINT  F/U neck pain, pt states it has been manageable. He has been sent to hand specialists for carpal tunnel so he may have to have surgery. States he will be having a nerve test.    Subjective   Dave Flynn is a 48 y.o. male  who presents to the office for follow-up.He has a history of neck pain.    He completed a ABRAM on 3/13/2018 with Dr. Barrientos. He reports significant relief for only 1.5 weeks.  He reported similar short term response following the 1/25/18 ABRAM.  No relief with cervical RFL 10/31/2017.      MRI 11/27/17 showed cervical disc herniation.  Wants to put off more neck surgery as long as possible.  History of cervical fusion 2/2016 with Dr. Martinez. Saw Dr. Martinez 4/19/2018, holding off on more surgery for now, will f/u in another 3 months.       Pain today 5/10 in severity.  He continues with 3/day of Percocet 10/325.  He does report at least moderate reduction in pain and helps him get through the work day.  He denies adverse reactions.  He also continues with Gabapentin 600 mg TID and Meloxicam 15 mg daily both of which are helpful      He works full time a      Patient reports that he and his wife have discussed and they cannot afford more surgery until the first of the year, he would like to maintain until then if possible. They are still paying off the previous surgery as well as multiple interventions.      Seeing Randi, looking at possible carpal tunnel release, waiting on EMG.      Neck Pain    This is a chronic problem. The current episode started more than 1 year ago. The problem occurs constantly. The problem has been unchanged. The pain is associated with nothing. The pain is present in the left side, right side, midline and occipital region. The quality of the pain is described as aching and burning. The pain is at a severity of 5/10. The pain is severe. The symptoms are aggravated by position and twisting. Associated symptoms include  headaches (back of the head), numbness (hands bilat.) and weakness (hands bilat.occasionally). Pertinent negatives include no chest pain, fever or photophobia. He has tried NSAIDs, neck support, muscle relaxants, ice, heat, home exercises and acetaminophen (Percocet) for the symptoms. The treatment provided moderate relief.      PEG Assessment   What number best describes your pain on average in the past week?5  What number best describes how, during the past week, pain has interfered with your enjoyment of life?5  What number best describes how, during the past week, pain has interfered with your general activity?  5    The following portions of the patient's history were reviewed and updated as appropriate: allergies, current medications, past family history, past medical history, past social history, past surgical history and problem list.    Review of Systems   Constitutional: Positive for activity change (restricted). Negative for appetite change and fever.   HENT: Negative for ear pain, hearing loss and mouth sores.    Eyes: Negative for photophobia and pain.   Respiratory: Negative for apnea, cough, choking and shortness of breath.    Cardiovascular: Negative for chest pain.   Gastrointestinal: Negative for abdominal pain, constipation, diarrhea and nausea.   Genitourinary: Negative for difficulty urinating, dysuria, flank pain and hematuria.   Musculoskeletal: Positive for neck pain and neck stiffness. Negative for back pain.   Neurological: Positive for dizziness (gabapentin?), weakness (hands bilat.occasionally), numbness (hands bilat.) and headaches (back of the head). Negative for seizures and light-headedness.   Psychiatric/Behavioral: Positive for sleep disturbance. Negative for agitation, confusion, hallucinations, self-injury and suicidal ideas.       Vitals:    05/04/18 1442   BP: 151/90   Pulse: 83   Resp: 16   Temp: 98.4 °F (36.9 °C)   SpO2: 98%   Weight: 80.7 kg (178 lb)   Height: 175.3 cm  "(69.02\")   PainSc:   5   PainLoc: Neck     Objective   Physical Exam   Constitutional: He is oriented to person, place, and time. He appears well-developed and well-nourished.   HENT:   Head: Normocephalic and atraumatic.   Eyes: Conjunctivae are normal.   Neck: Trachea normal. Neck supple. No muscular tenderness present. Decreased range of motion present.   Cardiovascular: Normal rate.    Pulmonary/Chest: Effort normal. No respiratory distress.   Musculoskeletal:        Cervical back: He exhibits decreased range of motion, tenderness and pain. He exhibits no bony tenderness, no edema and no spasm.   Neurological: He is alert and oriented to person, place, and time. No cranial nerve deficit. Gait normal.   Reflex Scores:       Tricep reflexes are 2+ on the right side and 2+ on the left side.       Bicep reflexes are 2+ on the right side and 2+ on the left side.       Brachioradialis reflexes are 2+ on the right side and 2+ on the left side.  Psychiatric: He has a normal mood and affect.   Nursing note and vitals reviewed.    Assessment/Plan   Dave was seen today for neck pain.    Diagnoses and all orders for this visit:    Other chronic pain    Cervical disc herniation    Cervical radiculopathy    Encounter for monitoring opioid maintenance therapy      --- Refill Percocet. Patient appears stable with current regimen. No adverse effects. Regarding continuation of opioids, there is no evidence of aberrant behavior or any red flags.  The patient continues with appropriate response to opioid therapy. ADL's remain intact by self.   --- The urine drug screen confirmation from 4/4/2018 has been reviewed and the result is appropriate based on patient history and SINAN report  --- Notify office if hand surgery scheduled   --- Follow-up 2 months        SINAN REPORT  As part of the patient's treatment plan, I am prescribing controlled substances. The patient has been made aware of appropriate use of such medications, " including potential risk of somnolence, limited ability to drive and/or work safely, and the potential for dependence or overdose. It has also bee made clear that these medications are for use by this patient only, without concomitant use of alcohol or other substances unless prescribed.     Patient has completed prescribing agreement detailing terms of continued prescribing of controlled substances, including monitoring SINAN reports, urine drug screening, and pill counts if necessary. The patient is aware that inappropriate use will results in cessation of prescribing such medications.    SINAN report has been reviewed and scanned into the patient's chart.    Date of last SINAN : 5/3/2018    History and physical exam exhibit continued safe and appropriate use of controlled substances.    EMR Dragon/Transcription disclaimer:   Much of this encounter note is an electronic transcription/translation of spoken language to printed text. The electronic translation of spoken language may permit erroneous, or at times, nonsensical words or phrases to be inadvertently transcribed; Although I have reviewed the note for such errors, some may still exist.

## 2018-05-31 RX ORDER — OXYCODONE AND ACETAMINOPHEN 10; 325 MG/1; MG/1
1 TABLET ORAL EVERY 8 HOURS PRN
Qty: 90 TABLET | Refills: 0 | Status: SHIPPED | OUTPATIENT
Start: 2018-05-31 | End: 2018-06-28 | Stop reason: SDUPTHER

## 2018-05-31 NOTE — TELEPHONE ENCOUNTER
Medication Refill Request    Date of phone call: 5-30-18    Medication being requested: Oxycodone-apap  mg sig: Take 1 tablet by mouth every 8 hours as needed  Qty: 90 tablets    Date of last visit: 5-04-18    Date of last refill: 5-03-18    SINAN up to date?: 5-03-18    Next Follow up?: 7-02-18    Any new pertinent information? (i.e, new medication allergies, new use of medications, change in patient's health or condition, non-compliance or inconsistency with prescribing agreement?): pt wants to  next monday

## 2018-06-28 RX ORDER — OXYCODONE AND ACETAMINOPHEN 10; 325 MG/1; MG/1
1 TABLET ORAL EVERY 8 HOURS PRN
Qty: 90 TABLET | Refills: 0 | Status: SHIPPED | OUTPATIENT
Start: 2018-06-28 | End: 2018-07-26 | Stop reason: SDUPTHER

## 2018-07-02 ENCOUNTER — OFFICE VISIT (OUTPATIENT)
Dept: PAIN MEDICINE | Facility: CLINIC | Age: 48
End: 2018-07-02

## 2018-07-02 VITALS
HEART RATE: 88 BPM | TEMPERATURE: 98.3 F | BODY MASS INDEX: 26.25 KG/M2 | HEIGHT: 69 IN | DIASTOLIC BLOOD PRESSURE: 90 MMHG | RESPIRATION RATE: 16 BRPM | OXYGEN SATURATION: 97 % | WEIGHT: 177.2 LBS | SYSTOLIC BLOOD PRESSURE: 135 MMHG

## 2018-07-02 DIAGNOSIS — M54.12 CERVICAL RADICULOPATHY: ICD-10-CM

## 2018-07-02 DIAGNOSIS — M96.1 POSTLAMINECTOMY SYNDROME, CERVICAL REGION: ICD-10-CM

## 2018-07-02 DIAGNOSIS — G56.00 CARPAL TUNNEL SYNDROME, UNSPECIFIED LATERALITY: ICD-10-CM

## 2018-07-02 DIAGNOSIS — M50.20 CERVICAL HERNIATED DISC: ICD-10-CM

## 2018-07-02 DIAGNOSIS — Z79.891 ENCOUNTER FOR MONITORING OPIOID MAINTENANCE THERAPY: ICD-10-CM

## 2018-07-02 DIAGNOSIS — Z51.81 ENCOUNTER FOR MONITORING OPIOID MAINTENANCE THERAPY: ICD-10-CM

## 2018-07-02 DIAGNOSIS — G89.29 OTHER CHRONIC PAIN: Primary | ICD-10-CM

## 2018-07-02 DIAGNOSIS — M50.31 DEGENERATION OF INTERVERTEBRAL DISC OF HIGH CERVICAL REGION: ICD-10-CM

## 2018-07-02 DIAGNOSIS — M47.812 FACET ARTHROPATHY, CERVICAL: ICD-10-CM

## 2018-07-02 PROCEDURE — 99214 OFFICE O/P EST MOD 30 MIN: CPT | Performed by: NURSE PRACTITIONER

## 2018-07-02 RX ORDER — ALPRAZOLAM 0.25 MG/1
TABLET ORAL
COMMUNITY
Start: 2018-06-26 | End: 2018-09-18

## 2018-07-02 NOTE — PROGRESS NOTES
CHIEF COMPLAINT  F/U neck, shoulder, and elbow pain. Pt states on the left side of neck and down his pain is worse. States he has carpal tunnel in both elbows and arms. He states his left arm and hand is worse than his left.     Subjective   Dave Flynn is a 48 y.o. male  who presents to the office for follow-up.He has a history of chronic neck pain with cervical disc herniation. HAs discussed surgery but cannot financial afford at this time. Also has history of CTS. Wants to have surgery for this first.   Last seen by Dr. Martinez in April. Sent to hand specialist ([facility]). Had EMG. Told he had carpal tunnel in both hands and left elbow. Has an appointment next week for follow-up.  Wants to see if he can have a CTS release. PCP is Dr. Reyes. If he were to have surgery with Dr. Martinez, it would be a posterior approach. Wants to try to wait on this.     Complains of pain in his neck. The pain can radiate into his arms and hands. Today his pain is 8/10VAS.  Describes the pain as continuous and worsening.  Continues with percocet 10/325 3/day., Gabapentin 600 mg 3/day and Tizanidine 2 mg 3/day.  He does have some somnolence and dizziness with gabapentin but has altered the dose to take 1/2 during the day and rest at night.  He does have some constipation with pain medication but is taking Colace OTC PRN.  The regimen helps decrease his pain moderately. ADL's by self.     Reviewed Dr. Martinez office visit 4/19/18.  Patient presents for follow-up of neck pain.  He is not getting any long-term relief from epidural.  Has been prescribed tizanidine and Medrol Dosepak on last visit.  The neck pain can radiate into the shoulder.  His hands.  Had a previous carpal tunnel release on the right but is having numbness and tingling in left hand as well.  Had injections into his wrist that did help him but the effect wore off quickly.  Try to avoid repeat neck surgery.  Send him to hand surgeon for evaluation of bilateral  carpal tunnel syndrome and reassess in 4 months.  Refill Zanaflex and gabapentin.    Neck Pain    This is a chronic problem. The current episode started more than 1 year ago. The problem occurs constantly. The problem has been gradually worsening. The quality of the pain is described as aching, shooting and stabbing. The pain is at a severity of 8/10. The pain is moderate. The symptoms are aggravated by position, stress and twisting. Associated symptoms include headaches (back of the head), numbness (hands bilat.) and weakness (hands bilat.occasionally). Pertinent negatives include no chest pain, fever or photophobia. He has tried oral narcotics (gabapentin) for the symptoms.      PEG Assessment   What number best describes your pain on average in the past week?8  What number best describes how, during the past week, pain has interfered with your enjoyment of life?8  What number best describes how, during the past week, pain has interfered with your general activity?  8    The following portions of the patient's history were reviewed and updated as appropriate: allergies, current medications, past family history, past medical history, past social history, past surgical history and problem list.    Review of Systems   Constitutional: Positive for activity change (restricted). Negative for appetite change and fever.   HENT: Negative for ear pain, hearing loss and mouth sores.    Eyes: Negative for photophobia and pain.   Respiratory: Negative for apnea, cough, choking and shortness of breath.    Cardiovascular: Negative for chest pain.   Gastrointestinal: Negative for abdominal pain, constipation, diarrhea and nausea.   Genitourinary: Negative for difficulty urinating, dysuria, flank pain and hematuria.   Musculoskeletal: Positive for neck pain and neck stiffness. Negative for back pain.   Neurological: Positive for dizziness (gabapentin?), weakness (hands bilat.occasionally), numbness (hands bilat.) and headaches  "(back of the head). Negative for seizures and light-headedness.   Psychiatric/Behavioral: Positive for sleep disturbance. Negative for agitation, confusion, hallucinations, self-injury and suicidal ideas.       Vitals:    07/02/18 1509   BP: 135/90   Pulse: 88   Resp: 16   Temp: 98.3 °F (36.8 °C)   SpO2: 97%   Weight: 80.4 kg (177 lb 3.2 oz)   Height: 175.3 cm (69.02\")   PainSc:   8   PainLoc: Neck  Comment: shoulder, and elbows     Objective   Physical Exam   Constitutional: He is oriented to person, place, and time. Vital signs are normal. He appears well-developed and well-nourished. He is cooperative.   HENT:   Head: Normocephalic and atraumatic.   Nose: Nose normal.   Eyes: Conjunctivae and lids are normal.   Neck: Trachea normal. Neck supple. Muscular tenderness present. No spinous process tenderness present. Decreased range of motion present.   Cardiovascular: Normal rate.    Pulmonary/Chest: Effort normal.   Abdominal: Normal appearance.   Musculoskeletal:   + TINEL sign bilaterally   Neurological: He is alert and oriented to person, place, and time. No cranial nerve deficit. Gait normal.   Reflex Scores:       Bicep reflexes are 2+ on the right side and 2+ on the left side.       Brachioradialis reflexes are 2+ on the right side and 2+ on the left side.  Skin: Skin is warm, dry and intact.   Psychiatric: He has a normal mood and affect. His speech is normal and behavior is normal. Judgment and thought content normal. Cognition and memory are normal.   Nursing note and vitals reviewed.      Assessment/Plan   Dave was seen today for neck pain.    Diagnoses and all orders for this visit:    Other chronic pain    Cervical herniated disc    Degeneration of intervertebral disc of high cervical region    Facet arthropathy, cervical    Cervical radiculopathy    Postlaminectomy syndrome, cervical region    Encounter for monitoring opioid maintenance therapy      --- The urine drug screen confirmation from 4-4-18 " has been reviewed and the result is APPROPRIATE based on patient history and SINAN report  --- Refill Percocet.  Patient appears stable with current regimen. No adverse effects. Regarding continuation of opioids, there is no evidence of aberrant behavior or any red flags.  The patient continues with appropriate response to opioid therapy. ADL's remain intact by self.   --- Continue with Dr. Martinez and hand specialist. Will call if he has surgery with hand specialist. OK to receive post-operative pain medication but just not take with current prescription.   --- Follow-up 2 months or sooner if needed.       SINAN REPORT    As part of the patient's treatment plan, I am prescribing controlled substances. The patient has been made aware of appropriate use of such medications, including potential risk of somnolence, limited ability to drive and/or work safely, and the potential for dependence or overdose. It has also bee made clear that these medications are for use by this patient only, without concomitant use of alcohol or other substances unless prescribed.     Patient has completed prescribing agreement detailing terms of continued prescribing of controlled substances, including monitoring SINAN reports, urine drug screening, and pill counts if necessary. The patient is aware that inappropriate use will results in cessation of prescribing such medications.    SINAN report has been reviewed and scanned into the patient's chart.    As the clinician, I personally reviewed the SINAN from 6-29-18 while the patient was in the office today.    History and physical exam exhibit continued safe and appropriate use of controlled substances.      EMR Dragon/Transcription disclaimer:   Much of this encounter note is an electronic transcription/translation of spoken language to printed text. The electronic translation of spoken language may permit erroneous, or at times, nonsensical words or phrases to be inadvertently  transcribed; Although I have reviewed the note for such errors, some may still exist.

## 2018-07-26 RX ORDER — OXYCODONE AND ACETAMINOPHEN 10; 325 MG/1; MG/1
1 TABLET ORAL EVERY 8 HOURS PRN
Qty: 90 TABLET | Refills: 0 | Status: SHIPPED | OUTPATIENT
Start: 2018-07-26 | End: 2018-08-30 | Stop reason: SDUPTHER

## 2018-07-26 NOTE — TELEPHONE ENCOUNTER
Medication Refill Request    Date of phone call: 7/25/18    Medication being requested: Oxycodone-apap 10 sig: q8hrs  Qty: 90    Date of last visit: 7/2/18    Date of last refill: 6/28/18    SINAN up to date?: 6/29/18    Next Follow up?: 8/30/18    Any new pertinent information? (i.e, new medication allergies, new use of medications, change in patient's health or condition, non-compliance or inconsistency with prescribing agreement?): n/a

## 2018-08-06 ENCOUNTER — TELEPHONE (OUTPATIENT)
Dept: PAIN MEDICINE | Facility: CLINIC | Age: 48
End: 2018-08-06

## 2018-08-06 NOTE — TELEPHONE ENCOUNTER
Thank you, I have relayed the message to the pt and he states he did not and will not fill the hydrocodone.

## 2018-08-06 NOTE — TELEPHONE ENCOUNTER
He can increase percocet to q6hrs PRN for the next 4 days but then return to regular dosing. That should get him through acute period. Do not fill hydrocodone. Thanks. bb

## 2018-08-06 NOTE — TELEPHONE ENCOUNTER
Pt called stating he had carpul tunnel surgery on 8-03-18 by Kleinert and Lisandra. It was his left elbow and left hand. He stated they prescribed Norco-he is unsure of the dose. States he was out of it and his wife knows but he doesn't. States he wanted to check with us and did not fill it. He wants to know if he can just increase the frequency of the oxycodone  mg which has already been prescribed by us.

## 2018-08-08 NOTE — PROGRESS NOTES
Subjective   Patient ID: Dave Flynn is a 48 y.o. male is here today for follow-up on neck pain.    At the last visit the patient reported some improvement in his symptoms after taking the MDP. He was still having neck pain that radiates into the right shoulder. The patient was referred to a hand surgeon for his bilateral carpal tunnel at the last visit.    Today the patient reports that he had the carpal tunnel surgery on the left hand with no improvement. He reports that he is still having neck pain. He complains of new onset abnormal gait.    Neck Pain    This is a chronic problem. The current episode started more than 1 month ago. The problem occurs daily. The problem has been unchanged. The pain is associated with nothing. Associated symptoms include headaches, numbness and tingling. Pertinent negatives include no pain with swallowing, trouble swallowing or weakness.       The following portions of the patient's history were reviewed and updated as appropriate: allergies, current medications, past family history, past medical history, past social history, past surgical history and problem list.    Review of Systems   HENT: Negative for trouble swallowing.    Musculoskeletal: Positive for gait problem and neck pain.   Neurological: Positive for tingling, numbness and headaches. Negative for weakness.   All other systems reviewed and are negative.    He is with his wife. It has been about 4 months since I have seen him. He went on to have a carpal tunnel release and an ulnar transposition by Dr. Hinojosa. It has helped a little but he still continues to have some pain in his hand and weakness. His wife says that she feels that his gait has been unsteady and the patient agrees. He is a little bit more myelopathic this time around them last time. In fact, last time I documented that he was mainly radiculopathic, but I think the situation has changed which indicates that the treatment and the roll of surgery has  changed. He did have some cord signal changes that I think are consistent with myelopathy. Therefore, despite him just having the hand and elbow surgery 2 weeks ago, we need to move forward with the posterior cervical decompression and fusion at C3-C4 with lateral mass screws and rods. I discussed this at length. He will probably need to be off work for 6-8 weeks. He has a less physical job than he did when we did the anterior surgery so an earlier work return is possible. He will need physical therapy. He will need to be patient about the time course of improvement. It could be quite slow, but we will move forward with this as soon as possible.      Objective   Physical Exam   Constitutional: He is oriented to person, place, and time. He appears well-developed and well-nourished.   HENT:   Head: Normocephalic and atraumatic.   Eyes: Pupils are equal, round, and reactive to light. Conjunctivae and EOM are normal.   Fundoscopic exam:       The right eye shows no papilledema. The right eye shows venous pulsations.        The left eye shows no papilledema. The left eye shows venous pulsations.   Neck: Carotid bruit is not present.   Neurological: He is oriented to person, place, and time. He has a normal Finger-Nose-Finger Test and a normal Heel to Shin Test. Gait normal.   Reflex Scores:       Tricep reflexes are 3+ on the right side and 3+ on the left side.       Bicep reflexes are 3+ on the right side and 3+ on the left side.       Brachioradialis reflexes are 3+ on the right side and 3+ on the left side.       Patellar reflexes are 3+ on the right side and 3+ on the left side.       Achilles reflexes are 3+ on the right side and 3+ on the left side.  Psychiatric: His speech is normal.     Neurologic Exam     Mental Status   Oriented to person, place, and time.   Registration of memory: Good recent and remote memory.   Attention: normal. Concentration: normal.   Speech: speech is normal   Level of consciousness:  alert  Knowledge: consistent with education.     Cranial Nerves     CN II   Visual fields full to confrontation.   Visual acuity: normal    CN III, IV, VI   Pupils are equal, round, and reactive to light.  Extraocular motions are normal.     CN V   Facial sensation intact.   Right corneal reflex: normal  Left corneal reflex: normal    CN VII   Facial expression full, symmetric.   Right facial weakness: none  Left facial weakness: none    CN VIII   Hearing: intact    CN IX, X   Palate: symmetric    CN XI   Right sternocleidomastoid strength: normal  Left sternocleidomastoid strength: normal    CN XII   Tongue: not atrophic  Tongue deviation: none    Motor Exam   Muscle bulk: normal  Right arm tone: normal  Left arm tone: normal  Right leg tone: normal  Left leg tone: normal    Strength   Strength 5/5 except as noted.     Sensory Exam   Light touch normal.     Gait, Coordination, and Reflexes     Gait  Gait: normal    Coordination   Finger to nose coordination: normal  Heel to shin coordination: normal    Reflexes   Right brachioradialis: 3+  Left brachioradialis: 3+  Right biceps: 3+  Left biceps: 3+  Right triceps: 3+  Left triceps: 3+  Right patellar: 3+  Left patellar: 3+  Right achilles: 3+  Left achilles: 3+  Right : 3+  Left : 3+  Left Butt: presentLight gait ataxia       Assessment/Plan   Independent Review of Radiographic Studies:    I reviewed again his MRI done 11/27/17 which shows a central disc herniation with bilateral root entrapment at C3-C4 with increased cord signal changes.  The rest the canal actually seems fairly patent.  Agree with the report.      Medical Decision Making:    Described and recommended a posterior cervical decompression at C3-C4 with posterolateral fusion and lateral mass screws and rods.  The goal of surgery is arrest of his progressive myelopathy and hopefully return of spinal cord function and improvement of his arm pain, reduction of his neck pain, and improvement  of his arm weakness and gait ataxia.  The risks include, but are not limited to, infection, hemorrhage requiring transfusion or reoperation, CSF leak requiring reoperation, incomplete relief of symptoms, potential need for additional surgeries in the future including another fusion, stroke, paralysis, coma, and death.  He agrees to proceed.      Dave was seen today for neck pain.    Diagnoses and all orders for this visit:    Cervical herniated disc  -     Case request; Standing  -     Case request    Cervical spinal stenosis  -     Case request; Standing  -     Case request    Cervical spondylosis with myelopathy  -     Case request; Standing  -     Case request      Return for 2 weeks with S or L.

## 2018-08-20 ENCOUNTER — OFFICE VISIT (OUTPATIENT)
Dept: NEUROSURGERY | Facility: CLINIC | Age: 48
End: 2018-08-20

## 2018-08-20 VITALS
HEART RATE: 67 BPM | BODY MASS INDEX: 26.22 KG/M2 | HEIGHT: 69 IN | SYSTOLIC BLOOD PRESSURE: 132 MMHG | DIASTOLIC BLOOD PRESSURE: 81 MMHG | WEIGHT: 177 LBS

## 2018-08-20 DIAGNOSIS — M47.12 CERVICAL SPONDYLOSIS WITH MYELOPATHY: ICD-10-CM

## 2018-08-20 DIAGNOSIS — M50.20 CERVICAL HERNIATED DISC: Primary | ICD-10-CM

## 2018-08-20 DIAGNOSIS — M48.02 CERVICAL SPINAL STENOSIS: ICD-10-CM

## 2018-08-20 PROCEDURE — 99214 OFFICE O/P EST MOD 30 MIN: CPT | Performed by: NEUROLOGICAL SURGERY

## 2018-08-21 ENCOUNTER — TRANSCRIBE ORDERS (OUTPATIENT)
Dept: NEUROSURGERY | Facility: CLINIC | Age: 48
End: 2018-08-21

## 2018-08-30 ENCOUNTER — OFFICE VISIT (OUTPATIENT)
Dept: PAIN MEDICINE | Facility: CLINIC | Age: 48
End: 2018-08-30

## 2018-08-30 VITALS
TEMPERATURE: 97.2 F | DIASTOLIC BLOOD PRESSURE: 83 MMHG | SYSTOLIC BLOOD PRESSURE: 120 MMHG | BODY MASS INDEX: 25.95 KG/M2 | RESPIRATION RATE: 15 BRPM | WEIGHT: 175.2 LBS | HEIGHT: 69 IN | HEART RATE: 84 BPM | OXYGEN SATURATION: 96 %

## 2018-08-30 DIAGNOSIS — Z79.891 ENCOUNTER FOR MONITORING OPIOID MAINTENANCE THERAPY: ICD-10-CM

## 2018-08-30 DIAGNOSIS — M54.12 CERVICAL RADICULOPATHY: ICD-10-CM

## 2018-08-30 DIAGNOSIS — Z51.81 ENCOUNTER FOR MONITORING OPIOID MAINTENANCE THERAPY: ICD-10-CM

## 2018-08-30 DIAGNOSIS — M96.1 POSTLAMINECTOMY SYNDROME, CERVICAL REGION: ICD-10-CM

## 2018-08-30 DIAGNOSIS — M50.30 DEGENERATIVE CERVICAL DISC: ICD-10-CM

## 2018-08-30 DIAGNOSIS — M48.02 CERVICAL SPINAL STENOSIS: ICD-10-CM

## 2018-08-30 DIAGNOSIS — G89.29 OTHER CHRONIC PAIN: Primary | ICD-10-CM

## 2018-08-30 DIAGNOSIS — M47.812 FACET ARTHROPATHY, CERVICAL: ICD-10-CM

## 2018-08-30 PROCEDURE — 99213 OFFICE O/P EST LOW 20 MIN: CPT | Performed by: NURSE PRACTITIONER

## 2018-08-30 RX ORDER — OXYCODONE AND ACETAMINOPHEN 10; 325 MG/1; MG/1
1 TABLET ORAL EVERY 8 HOURS PRN
Qty: 90 TABLET | Refills: 0 | Status: SHIPPED | OUTPATIENT
Start: 2018-08-30 | End: 2018-09-28 | Stop reason: HOSPADM

## 2018-08-30 NOTE — PROGRESS NOTES
CHIEF COMPLAINT  F/U neck, elbow, hand pain. Pt had surgery for carpal tunnel on 8-03-18. He thinks his pain has gotten worse especially in his left elbow and hand. States he will be having neck surgery on 9-25-18.     Subjective   Dave Flynn is a 48 y.o. male  who presents to the office for follow-up.He has a history of chronic neck and UE pain. Reports this is worse since last office visit.    Complains of pain in his neck, left land and left arm. Today his pain is 8/10VAS. Describes the pain as continuous and worsening. Continues with percocet 10/325 3-4/day(had to take increased due to surgery which was OK'ed by office), Gabapentin 600 mg 3/day and Tizanidine 2 mg 3/day.  Denies any side effects from the regimen except occasional dizziness.  He does have some constipation with pain medication but is taking Colace OTC PRN.  The regimen helps decrease his pain moderately. ADL's by self.        Reviewed Dr. Martinez office visit 8/20/18-- patient presents for follow-up of neck pain. After MDP. Patient had carpal tunnel surgery on the left hand with no improvement. Complains of new onset of abnormal gait. Patient is status post carpal tunnel release and ulnar transposition. Still reports some pain and hand and weakness. Patient feels as if his gait is unsteady. Patient appears to be more myelopathic this time than last time around. He did have some cord signal changes that are consistent with myelopathy. Plan is to move forward with posterior cervical decompression and fusion at C3-C4 with lateral mass screws and rods. The need to be off work 6-8 weeks. Will need physical therapy. Goal surgery is at rest of his progressive myelopathy and hopefully return spinal cord function and improvement of arm pain. Patient wishes to proceed with surgery. Follow-up in 2 weeks. Patient is scheduled for surgery on 9/25/18.    Neck Pain    This is a chronic problem. The current episode started more than 1 year ago. The problem  occurs constantly. The problem has been gradually worsening (worse since last office visit). The quality of the pain is described as aching, shooting and stabbing. The pain is at a severity of 8/10. The pain is moderate. The symptoms are aggravated by position, stress and twisting. Associated symptoms include headaches (back of the head), numbness (hands bilat.) and weakness (hands bilat.occasionally). Pertinent negatives include no chest pain, fever or photophobia. He has tried oral narcotics (gabapentin; planning for surgery) for the symptoms.      PEG Assessment   What number best describes your pain on average in the past week?8  What number best describes how, during the past week, pain has interfered with your enjoyment of life?8  What number best describes how, during the past week, pain has interfered with your general activity?  8    The following portions of the patient's history were reviewed and updated as appropriate: allergies, current medications, past family history, past medical history, past social history, past surgical history and problem list.    Review of Systems   Constitutional: Positive for activity change (restricted). Negative for appetite change and fever.   HENT: Negative for ear pain, hearing loss and mouth sores.    Eyes: Negative for photophobia and pain.   Respiratory: Negative for apnea, cough, choking and shortness of breath.    Cardiovascular: Negative for chest pain.   Gastrointestinal: Negative for abdominal pain, constipation, diarrhea and nausea.   Genitourinary: Negative for difficulty urinating, dysuria, flank pain and hematuria.   Musculoskeletal: Positive for neck pain and neck stiffness. Negative for back pain.   Neurological: Positive for dizziness (pt states every morning), weakness (hands bilat.occasionally), numbness (hands bilat.) and headaches (back of the head). Negative for seizures and light-headedness.   Psychiatric/Behavioral: Positive for sleep disturbance.  "Negative for agitation, confusion, hallucinations, self-injury and suicidal ideas.       Vitals:    08/30/18 1513   BP: 120/83   Pulse: 84   Resp: 15   Temp: 97.2 °F (36.2 °C)   SpO2: 96%   Weight: 79.5 kg (175 lb 3.2 oz)   Height: 175.3 cm (69.02\")   PainSc:   8   PainLoc: Neck  Comment: left hand and left elbow     Objective   Physical Exam   Constitutional: He is oriented to person, place, and time. Vital signs are normal. He appears well-developed and well-nourished. He is cooperative.   HENT:   Head: Normocephalic and atraumatic.   Nose: Nose normal.   Eyes: Conjunctivae and lids are normal.   Neck: Trachea normal. Neck supple. Muscular tenderness present. No spinous process tenderness present. Decreased range of motion present.   Cardiovascular: Normal rate.    Pulmonary/Chest: Effort normal.   Abdominal: Normal appearance.   Musculoskeletal:        Cervical back: He exhibits decreased range of motion and tenderness.   Neurological: He is alert and oriented to person, place, and time. Gait normal.   Reflex Scores:       Bicep reflexes are 1+ on the right side and 1+ on the left side.       Brachioradialis reflexes are 1+ on the right side and 1+ on the left side.  Skin: Skin is warm, dry and intact.        Psychiatric: He has a normal mood and affect. His speech is normal and behavior is normal. Judgment and thought content normal. Cognition and memory are normal.   Nursing note and vitals reviewed.      Assessment/Plan   Dave was seen today for neck pain, elbow pain and hand pain.    Diagnoses and all orders for this visit:    Other chronic pain    Postlaminectomy syndrome, cervical region    Cervical spinal stenosis    Facet arthropathy, cervical    Degenerative cervical disc    Cervical radiculopathy    Encounter for monitoring opioid maintenance therapy      --- The urine drug screen confirmation from 4-4-18 has been reviewed and the result is APPROPRIATE based on patient history and SINAN report  --- " Refill Percocet. Patient appears stable with current regimen. No adverse effects. Regarding continuation of opioids, there is no evidence of aberrant behavior or any red flags.  The patient continues with appropriate response to opioid therapy. ADL's remain intact by self.  OK TO FILL TODAY.  --- Proceed with surgery. Can receive post-operative pain medication from surgeon.  --- Follow-up when released to return here from neurosurgery.     SINAN REPORT    As part of the patient's treatment plan, I am prescribing controlled substances. The patient has been made aware of appropriate use of such medications, including potential risk of somnolence, limited ability to drive and/or work safely, and the potential for dependence or overdose. It has also bee made clear that these medications are for use by this patient only, without concomitant use of alcohol or other substances unless prescribed.     Patient has completed prescribing agreement detailing terms of continued prescribing of controlled substances, including monitoring SINAN reports, urine drug screening, and pill counts if necessary. The patient is aware that inappropriate use will results in cessation of prescribing such medications.    SINAN report has been reviewed and scanned into the patient's chart.    As the clinician, I personally reviewed the SINAN from 8-28-18 while the patient was in the office today.    History and physical exam exhibit continued safe and appropriate use of controlled substances.      EMR Dragon/Transcription disclaimer:   Much of this encounter note is an electronic transcription/translation of spoken language to printed text. The electronic translation of spoken language may permit erroneous, or at times, nonsensical words or phrases to be inadvertently transcribed; Although I have reviewed the note for such errors, some may still exist.

## 2018-09-18 ENCOUNTER — APPOINTMENT (OUTPATIENT)
Dept: PREADMISSION TESTING | Facility: HOSPITAL | Age: 48
End: 2018-09-18

## 2018-09-18 ENCOUNTER — HOSPITAL ENCOUNTER (OUTPATIENT)
Dept: GENERAL RADIOLOGY | Facility: HOSPITAL | Age: 48
Discharge: HOME | End: 2018-09-18
Admitting: NEUROLOGICAL SURGERY

## 2018-09-18 VITALS
SYSTOLIC BLOOD PRESSURE: 119 MMHG | DIASTOLIC BLOOD PRESSURE: 73 MMHG | BODY MASS INDEX: 26.23 KG/M2 | WEIGHT: 177.1 LBS | RESPIRATION RATE: 16 BRPM | HEIGHT: 69 IN | OXYGEN SATURATION: 99 % | TEMPERATURE: 97.9 F | HEART RATE: 72 BPM

## 2018-09-18 LAB
ABO GROUP BLD: NORMAL
ANION GAP SERPL CALCULATED.3IONS-SCNC: 10.2 MMOL/L
APTT PPP: 30.6 SECONDS (ref 22.7–35.4)
BACTERIA UR QL AUTO: NORMAL /HPF
BASOPHILS # BLD AUTO: 0.01 10*3/MM3 (ref 0–0.2)
BASOPHILS NFR BLD AUTO: 0.2 % (ref 0–1.5)
BILIRUB UR QL STRIP: NEGATIVE
BLD GP AB SCN SERPL QL: NEGATIVE
BUN BLD-MCNC: 12 MG/DL (ref 6–20)
BUN/CREAT SERPL: 15.8 (ref 7–25)
CALCIUM SPEC-SCNC: 9.4 MG/DL (ref 8.6–10.5)
CHLORIDE SERPL-SCNC: 102 MMOL/L (ref 98–107)
CLARITY UR: CLEAR
CO2 SERPL-SCNC: 26.8 MMOL/L (ref 22–29)
COLOR UR: YELLOW
CREAT BLD-MCNC: 0.76 MG/DL (ref 0.76–1.27)
DEPRECATED RDW RBC AUTO: 40.5 FL (ref 37–54)
EOSINOPHIL # BLD AUTO: 0.16 10*3/MM3 (ref 0–0.7)
EOSINOPHIL NFR BLD AUTO: 3.3 % (ref 0.3–6.2)
ERYTHROCYTE [DISTWIDTH] IN BLOOD BY AUTOMATED COUNT: 13.3 % (ref 11.5–14.5)
GFR SERPL CREATININE-BSD FRML MDRD: 133 ML/MIN/1.73
GLUCOSE BLD-MCNC: 111 MG/DL (ref 65–99)
GLUCOSE UR STRIP-MCNC: NEGATIVE MG/DL
HCT VFR BLD AUTO: 40.3 % (ref 40.4–52.2)
HGB BLD-MCNC: 14.2 G/DL (ref 13.7–17.6)
HGB UR QL STRIP.AUTO: NEGATIVE
HYALINE CASTS UR QL AUTO: NORMAL /LPF
IMM GRANULOCYTES # BLD: 0.01 10*3/MM3 (ref 0–0.03)
IMM GRANULOCYTES NFR BLD: 0.2 % (ref 0–0.5)
INR PPP: 1.03 (ref 0.9–1.1)
KETONES UR QL STRIP: NEGATIVE
LEUKOCYTE ESTERASE UR QL STRIP.AUTO: NEGATIVE
LYMPHOCYTES # BLD AUTO: 1.95 10*3/MM3 (ref 0.9–4.8)
LYMPHOCYTES NFR BLD AUTO: 39.8 % (ref 19.6–45.3)
MCH RBC QN AUTO: 29.6 PG (ref 27–32.7)
MCHC RBC AUTO-ENTMCNC: 35.2 G/DL (ref 32.6–36.4)
MCV RBC AUTO: 84 FL (ref 79.8–96.2)
MONOCYTES # BLD AUTO: 0.82 10*3/MM3 (ref 0.2–1.2)
MONOCYTES NFR BLD AUTO: 16.7 % (ref 5–12)
NEUTROPHILS # BLD AUTO: 1.96 10*3/MM3 (ref 1.9–8.1)
NEUTROPHILS NFR BLD AUTO: 40 % (ref 42.7–76)
NITRITE UR QL STRIP: NEGATIVE
PH UR STRIP.AUTO: 5.5 [PH] (ref 5–8)
PLATELET # BLD AUTO: 285 10*3/MM3 (ref 140–500)
PMV BLD AUTO: 10.3 FL (ref 6–12)
POTASSIUM BLD-SCNC: 4.1 MMOL/L (ref 3.5–5.2)
PROT UR QL STRIP: NEGATIVE
PROTHROMBIN TIME: 13.3 SECONDS (ref 11.7–14.2)
RBC # BLD AUTO: 4.8 10*6/MM3 (ref 4.6–6)
RBC # UR: NORMAL /HPF
REF LAB TEST METHOD: NORMAL
RH BLD: POSITIVE
SODIUM BLD-SCNC: 139 MMOL/L (ref 136–145)
SP GR UR STRIP: 1.01 (ref 1–1.03)
SQUAMOUS #/AREA URNS HPF: NORMAL /HPF
T&S EXPIRATION DATE: NORMAL
UROBILINOGEN UR QL STRIP: NORMAL
WBC NRBC COR # BLD: 4.9 10*3/MM3 (ref 4.5–10.7)
WBC UR QL AUTO: NORMAL /HPF

## 2018-09-18 PROCEDURE — 81001 URINALYSIS AUTO W/SCOPE: CPT | Performed by: NEUROLOGICAL SURGERY

## 2018-09-18 PROCEDURE — 85610 PROTHROMBIN TIME: CPT | Performed by: NEUROLOGICAL SURGERY

## 2018-09-18 PROCEDURE — 71046 X-RAY EXAM CHEST 2 VIEWS: CPT

## 2018-09-18 PROCEDURE — 86850 RBC ANTIBODY SCREEN: CPT | Performed by: NEUROLOGICAL SURGERY

## 2018-09-18 PROCEDURE — 85730 THROMBOPLASTIN TIME PARTIAL: CPT | Performed by: NEUROLOGICAL SURGERY

## 2018-09-18 PROCEDURE — 86900 BLOOD TYPING SEROLOGIC ABO: CPT | Performed by: NEUROLOGICAL SURGERY

## 2018-09-18 PROCEDURE — 80048 BASIC METABOLIC PNL TOTAL CA: CPT | Performed by: NEUROLOGICAL SURGERY

## 2018-09-18 PROCEDURE — 36415 COLL VENOUS BLD VENIPUNCTURE: CPT

## 2018-09-18 PROCEDURE — 93010 ELECTROCARDIOGRAM REPORT: CPT | Performed by: INTERNAL MEDICINE

## 2018-09-18 PROCEDURE — 86901 BLOOD TYPING SEROLOGIC RH(D): CPT | Performed by: NEUROLOGICAL SURGERY

## 2018-09-18 PROCEDURE — 93005 ELECTROCARDIOGRAM TRACING: CPT

## 2018-09-18 PROCEDURE — 85025 COMPLETE CBC W/AUTO DIFF WBC: CPT | Performed by: NEUROLOGICAL SURGERY

## 2018-09-18 NOTE — DISCHARGE INSTRUCTIONS
Take the following medications the morning of surgery with a small sip of water:  ATENOLOL/CHLORTHALIDONE, GABAPENTIN, PERCOCET IF NEEDED      Arrive to hospital on your day of surgery at 8:00 AM.      General Instructions:  • Do not eat solid food after midnight the night before surgery.  • You may drink clear liquids day of surgery but must stop at least one hour before your hospital arrival time.  • It is beneficial for you to have a clear drink that contains carbohydrates the day of surgery.  We suggest a 12 to 20 ounce bottle of Gatorade or Powerade for non-diabetic patients or a 12 to 20 ounce bottle of G2 or Powerade Zero for diabetic patients. (Pediatric patients, are not advised to drink a 12 to 20 ounce carbohydrate drink)    Clear liquids are liquids you can see through.  Nothing red in color.     Plain water                               Sports drinks  Sodas                                   Gelatin (Jell-O)  Fruit juices without pulp such as white grape juice and apple juice  Popsicles that contain no fruit or yogurt  Tea or coffee (no cream or milk added)  Gatorade / Powerade  G2 / Powerade Zero    • Infants may have breast milk up to four hours before surgery.  • Infants drinking formula may drink formula up to six hours before surgery.   • Patients who avoid smoking, chewing tobacco and alcohol for 4 weeks prior to surgery have a reduced risk of post-operative complications.  Quit smoking as many days before surgery as you can.  • Do not smoke, use chewing tobacco or drink alcohol the day of surgery.   • If applicable bring your C-PAP/ BI-PAP machine.  • Bring any papers given to you in the doctor’s office.  • Wear clean comfortable clothes and socks.  • Do not wear contact lenses or make-up.  Bring a case for your glasses.   • Bring crutches or walker if applicable.  • Remove all piercings.  Leave jewelry and any other valuables at home.  • Hair extensions with metal clips must be removed prior to  surgery.  • The Pre-Admission Testing nurse will instruct you to bring medications if unable to obtain an accurate list in Pre-Admission Testing.        If you were given a blood bank ID arm band remember to bring it with you the day of surgery.    Preventing a Surgical Site Infection:  • For 2 to 3 days before surgery, avoid shaving with a razor because the razor can irritate skin and make it easier to develop an infection.    • Any areas of open skin can increase the risk of a post-operative wound infection by allowing bacteria to enter and travel throughout the body.  Notify your surgeon if you have any skin wounds / rashes even if it is not near the expected surgical site.  The area will need assessed to determine if surgery should be delayed until it is healed.  • The night prior to surgery sleep in a clean bed with clean clothing.  Do not allow pets to sleep with you.  • Shower on the morning of surgery using a fresh bar of anti-bacterial soap (such as Dial) and clean washcloth.  Dry with a clean towel and dress in clean clothing.  • Ask your surgeon if you will be receiving antibiotics prior to surgery.  • Make sure you, your family, and all healthcare providers clean their hands with soap and water or an alcohol based hand  before caring for you or your wound.    Day of surgery:  Upon arrival, a Pre-op nurse and Anesthesiologist will review your health history, obtain vital signs, and answer questions you may have.  The only belongings needed at this time will be your home medications and if applicable your C-PAP/BI-PAP machine.  If you are staying overnight your family can leave the rest of your belongings in the car and bring them to your room later.  A Pre-op nurse will start an IV and you may receive medication in preparation for surgery, including something to help you relax.  Your family will be able to see you in the Pre-op area.  While you are in surgery your family should notify the waiting  room  if they leave the waiting room area and provide a contact phone number.    Please be aware that surgery does come with discomfort.  We want to make every effort to control your discomfort so please discuss any uncontrolled symptoms with your nurse.   Your doctor will most likely have prescribed pain medications.      If you are going home after surgery you will receive individualized written care instructions before being discharged.  A responsible adult must drive you to and from the hospital on the day of your surgery and stay with you for 24 hours.    If you are staying overnight following surgery, you will be transported to your hospital room following the recovery period.  [facility] has all private rooms.    You have received a list of surgical assistants for your reference.  If you have any questions please call Pre-Admission Testing at [phone].  Deductibles and co-payments are collected on the day of service. Please be prepared to pay the required co-pay, deductible or deposit on the day of service as defined by your plan.

## 2018-09-25 ENCOUNTER — APPOINTMENT (OUTPATIENT)
Dept: GENERAL RADIOLOGY | Facility: HOSPITAL | Age: 48
End: 2018-09-25

## 2018-09-25 ENCOUNTER — HOSPITAL ENCOUNTER (INPATIENT)
Facility: HOSPITAL | Age: 48
LOS: 3 days | Discharge: HOME HEALTH | End: 2018-09-28
Attending: NEUROLOGICAL SURGERY | Admitting: NEUROLOGICAL SURGERY

## 2018-09-25 ENCOUNTER — ANESTHESIA EVENT (OUTPATIENT)
Dept: PERIOP | Facility: HOSPITAL | Age: 48
End: 2018-09-25

## 2018-09-25 ENCOUNTER — ANESTHESIA (OUTPATIENT)
Dept: PERIOP | Facility: HOSPITAL | Age: 48
End: 2018-09-25

## 2018-09-25 DIAGNOSIS — M47.12 CERVICAL SPONDYLOSIS WITH MYELOPATHY: ICD-10-CM

## 2018-09-25 DIAGNOSIS — M48.02 CERVICAL SPINAL STENOSIS: ICD-10-CM

## 2018-09-25 DIAGNOSIS — M54.2 NECK PAIN: ICD-10-CM

## 2018-09-25 DIAGNOSIS — R26.2 DIFFICULTY WALKING: Primary | ICD-10-CM

## 2018-09-25 DIAGNOSIS — M50.20 CERVICAL HERNIATED DISC: ICD-10-CM

## 2018-09-25 PROCEDURE — 20930 SP BONE ALGRFT MORSEL ADD-ON: CPT | Performed by: NEUROLOGICAL SURGERY

## 2018-09-25 PROCEDURE — 22840 INSERT SPINE FIXATION DEVICE: CPT | Performed by: NEUROLOGICAL SURGERY

## 2018-09-25 PROCEDURE — 25010000002 PROPOFOL 10 MG/ML EMULSION: Performed by: NURSE ANESTHETIST, CERTIFIED REGISTERED

## 2018-09-25 PROCEDURE — 25010000002 MIDAZOLAM PER 1 MG: Performed by: ANESTHESIOLOGY

## 2018-09-25 PROCEDURE — C1713 ANCHOR/SCREW BN/BN,TIS/BN: HCPCS | Performed by: NEUROLOGICAL SURGERY

## 2018-09-25 PROCEDURE — 25010000002 FENTANYL CITRATE (PF) 100 MCG/2ML SOLUTION: Performed by: NURSE ANESTHETIST, CERTIFIED REGISTERED

## 2018-09-25 PROCEDURE — 25010000003 CEFAZOLIN IN DEXTROSE 2-4 GM/100ML-% SOLUTION: Performed by: NEUROLOGICAL SURGERY

## 2018-09-25 PROCEDURE — 25010000002 HYDROMORPHONE PER 4 MG: Performed by: NURSE ANESTHETIST, CERTIFIED REGISTERED

## 2018-09-25 PROCEDURE — 25010000002 PHENYLEPHRINE PER 1 ML: Performed by: NURSE ANESTHETIST, CERTIFIED REGISTERED

## 2018-09-25 PROCEDURE — 20936 SP BONE AGRFT LOCAL ADD-ON: CPT | Performed by: NEUROLOGICAL SURGERY

## 2018-09-25 PROCEDURE — 25010000002 MIDAZOLAM PER 1 MG

## 2018-09-25 PROCEDURE — 63001 REMOVE SPINE LAMINA 1/2 CRVL: CPT | Performed by: NEUROLOGICAL SURGERY

## 2018-09-25 PROCEDURE — 25010000002 MAGNESIUM SULFATE PER 500 MG OF MAGNESIUM: Performed by: ANESTHESIOLOGY

## 2018-09-25 PROCEDURE — 22600 ARTHRD PST TQ 1NTRSPC CRV: CPT | Performed by: NEUROLOGICAL SURGERY

## 2018-09-25 PROCEDURE — 25010000003 CEFAZOLIN PER 500 MG: Performed by: NEUROLOGICAL SURGERY

## 2018-09-25 PROCEDURE — 76000 FLUOROSCOPY <1 HR PHYS/QHP: CPT

## 2018-09-25 PROCEDURE — 72040 X-RAY EXAM NECK SPINE 2-3 VW: CPT

## 2018-09-25 PROCEDURE — 0RG107J FUSION OF CERVICAL VERTEBRAL JOINT WITH AUTOLOGOUS TISSUE SUBSTITUTE, POSTERIOR APPROACH, ANTERIOR COLUMN, OPEN APPROACH: ICD-10-PCS | Performed by: NEUROLOGICAL SURGERY

## 2018-09-25 PROCEDURE — 25010000002 DEXAMETHASONE PER 1 MG: Performed by: NURSE ANESTHETIST, CERTIFIED REGISTERED

## 2018-09-25 PROCEDURE — 25010000002 METHOCARBAMOL 1000 MG/10ML SOLUTION: Performed by: NEUROLOGICAL SURGERY

## 2018-09-25 DEVICE — ROD 7753730 PRE-CUT 3.5MM X 30MM
Type: IMPLANTABLE DEVICE | Site: SPINE CERVICAL | Status: FUNCTIONAL
Brand: VERTEX® RECONSTRUCTION SYSTEM

## 2018-09-25 DEVICE — GRFT BONE MAGNIFUSE PC 1.75X5CM: Type: IMPLANTABLE DEVICE | Site: SPINE CERVICAL | Status: FUNCTIONAL

## 2018-09-25 RX ORDER — DIAZEPAM 5 MG/1
5 TABLET ORAL EVERY 8 HOURS PRN
Status: DISCONTINUED | OUTPATIENT
Start: 2018-09-25 | End: 2018-09-28 | Stop reason: HOSPADM

## 2018-09-25 RX ORDER — ROCURONIUM BROMIDE 10 MG/ML
INJECTION, SOLUTION INTRAVENOUS AS NEEDED
Status: DISCONTINUED | OUTPATIENT
Start: 2018-09-25 | End: 2018-09-25 | Stop reason: SURG

## 2018-09-25 RX ORDER — OXYCODONE AND ACETAMINOPHEN 10; 325 MG/1; MG/1
1 TABLET ORAL EVERY 4 HOURS PRN
Status: DISCONTINUED | OUTPATIENT
Start: 2018-09-25 | End: 2018-09-28

## 2018-09-25 RX ORDER — METHOCARBAMOL 100 MG/ML
1000 INJECTION, SOLUTION INTRAMUSCULAR; INTRAVENOUS ONCE
Status: COMPLETED | OUTPATIENT
Start: 2018-09-25 | End: 2018-09-25

## 2018-09-25 RX ORDER — HYDROMORPHONE HCL IN 0.9% NACL 10 MG/50ML
PATIENT CONTROLLED ANALGESIA SYRINGE INTRAVENOUS CONTINUOUS
Status: DISCONTINUED | OUTPATIENT
Start: 2018-09-25 | End: 2018-09-26

## 2018-09-25 RX ORDER — ATENOLOL AND CHLORTHALIDONE 50; 25 MG/1; MG/1
TABLET ORAL
Status: DISCONTINUED | OUTPATIENT
Start: 2018-09-25 | End: 2018-09-28 | Stop reason: HOSPADM

## 2018-09-25 RX ORDER — PROMETHAZINE HYDROCHLORIDE 25 MG/ML
12.5 INJECTION, SOLUTION INTRAMUSCULAR; INTRAVENOUS ONCE AS NEEDED
Status: DISCONTINUED | OUTPATIENT
Start: 2018-09-25 | End: 2018-09-25 | Stop reason: HOSPADM

## 2018-09-25 RX ORDER — DOCUSATE SODIUM 100 MG/1
100 CAPSULE, LIQUID FILLED ORAL 2 TIMES DAILY PRN
Status: DISCONTINUED | OUTPATIENT
Start: 2018-09-25 | End: 2018-09-26

## 2018-09-25 RX ORDER — ACETAMINOPHEN 325 MG/1
650 TABLET ORAL EVERY 4 HOURS PRN
Status: DISCONTINUED | OUTPATIENT
Start: 2018-09-25 | End: 2018-09-28 | Stop reason: HOSPADM

## 2018-09-25 RX ORDER — FENTANYL CITRATE 50 UG/ML
INJECTION, SOLUTION INTRAMUSCULAR; INTRAVENOUS AS NEEDED
Status: DISCONTINUED | OUTPATIENT
Start: 2018-09-25 | End: 2018-09-25 | Stop reason: SURG

## 2018-09-25 RX ORDER — FLUMAZENIL 0.1 MG/ML
0.2 INJECTION INTRAVENOUS AS NEEDED
Status: DISCONTINUED | OUTPATIENT
Start: 2018-09-25 | End: 2018-09-25 | Stop reason: HOSPADM

## 2018-09-25 RX ORDER — MIDAZOLAM HYDROCHLORIDE 1 MG/ML
INJECTION INTRAMUSCULAR; INTRAVENOUS
Status: COMPLETED
Start: 2018-09-25 | End: 2018-09-25

## 2018-09-25 RX ORDER — DIPHENHYDRAMINE HYDROCHLORIDE 50 MG/ML
12.5 INJECTION INTRAMUSCULAR; INTRAVENOUS
Status: DISCONTINUED | OUTPATIENT
Start: 2018-09-25 | End: 2018-09-25 | Stop reason: HOSPADM

## 2018-09-25 RX ORDER — SCOLOPAMINE TRANSDERMAL SYSTEM 1 MG/1
1 PATCH, EXTENDED RELEASE TRANSDERMAL
Status: DISCONTINUED | OUTPATIENT
Start: 2018-09-25 | End: 2018-09-25 | Stop reason: HOSPADM

## 2018-09-25 RX ORDER — WOUND DRESSING ADHESIVE - LIQUID
LIQUID MISCELLANEOUS AS NEEDED
Status: DISCONTINUED | OUTPATIENT
Start: 2018-09-25 | End: 2018-09-25 | Stop reason: HOSPADM

## 2018-09-25 RX ORDER — SODIUM CHLORIDE, SODIUM LACTATE, POTASSIUM CHLORIDE, CALCIUM CHLORIDE 600; 310; 30; 20 MG/100ML; MG/100ML; MG/100ML; MG/100ML
9 INJECTION, SOLUTION INTRAVENOUS CONTINUOUS
Status: DISCONTINUED | OUTPATIENT
Start: 2018-09-25 | End: 2018-09-26

## 2018-09-25 RX ORDER — HYDROMORPHONE HCL IN 0.9% NACL 10 MG/50ML
PATIENT CONTROLLED ANALGESIA SYRINGE INTRAVENOUS CONTINUOUS
Status: DISCONTINUED | OUTPATIENT
Start: 2018-09-25 | End: 2018-09-25

## 2018-09-25 RX ORDER — FENTANYL CITRATE 50 UG/ML
50 INJECTION, SOLUTION INTRAMUSCULAR; INTRAVENOUS
Status: DISCONTINUED | OUTPATIENT
Start: 2018-09-25 | End: 2018-09-25 | Stop reason: HOSPADM

## 2018-09-25 RX ORDER — NALOXONE HCL 0.4 MG/ML
0.1 VIAL (ML) INJECTION
Status: DISCONTINUED | OUTPATIENT
Start: 2018-09-25 | End: 2018-09-28

## 2018-09-25 RX ORDER — CEFAZOLIN SODIUM 2 G/100ML
2 INJECTION, SOLUTION INTRAVENOUS ONCE
Status: COMPLETED | OUTPATIENT
Start: 2018-09-25 | End: 2018-09-25

## 2018-09-25 RX ORDER — SODIUM CHLORIDE, SODIUM LACTATE, POTASSIUM CHLORIDE, CALCIUM CHLORIDE 600; 310; 30; 20 MG/100ML; MG/100ML; MG/100ML; MG/100ML
INJECTION, SOLUTION INTRAVENOUS CONTINUOUS PRN
Status: DISCONTINUED | OUTPATIENT
Start: 2018-09-25 | End: 2018-09-25 | Stop reason: SURG

## 2018-09-25 RX ORDER — SODIUM CHLORIDE 0.9 % (FLUSH) 0.9 %
1-10 SYRINGE (ML) INJECTION AS NEEDED
Status: DISCONTINUED | OUTPATIENT
Start: 2018-09-25 | End: 2018-09-28

## 2018-09-25 RX ORDER — EPHEDRINE SULFATE 50 MG/ML
INJECTION, SOLUTION INTRAVENOUS AS NEEDED
Status: DISCONTINUED | OUTPATIENT
Start: 2018-09-25 | End: 2018-09-25 | Stop reason: SURG

## 2018-09-25 RX ORDER — SODIUM CHLORIDE 0.9 % (FLUSH) 0.9 %
1-10 SYRINGE (ML) INJECTION AS NEEDED
Status: DISCONTINUED | OUTPATIENT
Start: 2018-09-25 | End: 2018-09-25 | Stop reason: HOSPADM

## 2018-09-25 RX ORDER — HYDROCODONE BITARTRATE AND ACETAMINOPHEN 7.5; 325 MG/1; MG/1
1 TABLET ORAL ONCE AS NEEDED
Status: DISCONTINUED | OUTPATIENT
Start: 2018-09-25 | End: 2018-09-25 | Stop reason: HOSPADM

## 2018-09-25 RX ORDER — LABETALOL HYDROCHLORIDE 5 MG/ML
5 INJECTION, SOLUTION INTRAVENOUS
Status: DISCONTINUED | OUTPATIENT
Start: 2018-09-25 | End: 2018-09-25 | Stop reason: HOSPADM

## 2018-09-25 RX ORDER — SENNA AND DOCUSATE SODIUM 50; 8.6 MG/1; MG/1
1 TABLET, FILM COATED ORAL NIGHTLY PRN
Status: DISCONTINUED | OUTPATIENT
Start: 2018-09-25 | End: 2018-09-28 | Stop reason: HOSPADM

## 2018-09-25 RX ORDER — ATORVASTATIN CALCIUM 20 MG/1
20 TABLET, FILM COATED ORAL DAILY
Status: DISCONTINUED | OUTPATIENT
Start: 2018-09-25 | End: 2018-09-28 | Stop reason: HOSPADM

## 2018-09-25 RX ORDER — OXYCODONE AND ACETAMINOPHEN 7.5; 325 MG/1; MG/1
1 TABLET ORAL ONCE AS NEEDED
Status: DISCONTINUED | OUTPATIENT
Start: 2018-09-25 | End: 2018-09-25 | Stop reason: HOSPADM

## 2018-09-25 RX ORDER — ZOLPIDEM TARTRATE 5 MG/1
10 TABLET ORAL NIGHTLY PRN
Status: DISCONTINUED | OUTPATIENT
Start: 2018-09-25 | End: 2018-09-28 | Stop reason: HOSPADM

## 2018-09-25 RX ORDER — PROMETHAZINE HYDROCHLORIDE 25 MG/1
12.5 TABLET ORAL ONCE AS NEEDED
Status: DISCONTINUED | OUTPATIENT
Start: 2018-09-25 | End: 2018-09-25 | Stop reason: HOSPADM

## 2018-09-25 RX ORDER — PROMETHAZINE HYDROCHLORIDE 25 MG/1
25 TABLET ORAL ONCE AS NEEDED
Status: DISCONTINUED | OUTPATIENT
Start: 2018-09-25 | End: 2018-09-25 | Stop reason: HOSPADM

## 2018-09-25 RX ORDER — DEXAMETHASONE SODIUM PHOSPHATE 10 MG/ML
INJECTION INTRAMUSCULAR; INTRAVENOUS AS NEEDED
Status: DISCONTINUED | OUTPATIENT
Start: 2018-09-25 | End: 2018-09-25 | Stop reason: SURG

## 2018-09-25 RX ORDER — EPHEDRINE SULFATE 50 MG/ML
5 INJECTION, SOLUTION INTRAVENOUS ONCE AS NEEDED
Status: DISCONTINUED | OUTPATIENT
Start: 2018-09-25 | End: 2018-09-25 | Stop reason: HOSPADM

## 2018-09-25 RX ORDER — ONDANSETRON 2 MG/ML
4 INJECTION INTRAMUSCULAR; INTRAVENOUS ONCE AS NEEDED
Status: DISCONTINUED | OUTPATIENT
Start: 2018-09-25 | End: 2018-09-25 | Stop reason: HOSPADM

## 2018-09-25 RX ORDER — MAGNESIUM SULFATE HEPTAHYDRATE 500 MG/ML
INJECTION, SOLUTION INTRAMUSCULAR; INTRAVENOUS AS NEEDED
Status: DISCONTINUED | OUTPATIENT
Start: 2018-09-25 | End: 2018-09-25 | Stop reason: SURG

## 2018-09-25 RX ORDER — NALOXONE HCL 0.4 MG/ML
0.2 VIAL (ML) INJECTION AS NEEDED
Status: DISCONTINUED | OUTPATIENT
Start: 2018-09-25 | End: 2018-09-25 | Stop reason: HOSPADM

## 2018-09-25 RX ORDER — MIDAZOLAM HYDROCHLORIDE 1 MG/ML
0.5 INJECTION INTRAMUSCULAR; INTRAVENOUS ONCE
Status: COMPLETED | OUTPATIENT
Start: 2018-09-25 | End: 2018-09-25

## 2018-09-25 RX ORDER — MIDAZOLAM HYDROCHLORIDE 1 MG/ML
2 INJECTION INTRAMUSCULAR; INTRAVENOUS
Status: DISCONTINUED | OUTPATIENT
Start: 2018-09-25 | End: 2018-09-25 | Stop reason: HOSPADM

## 2018-09-25 RX ORDER — LIDOCAINE HYDROCHLORIDE 20 MG/ML
INJECTION, SOLUTION INFILTRATION; PERINEURAL AS NEEDED
Status: DISCONTINUED | OUTPATIENT
Start: 2018-09-25 | End: 2018-09-25 | Stop reason: SURG

## 2018-09-25 RX ORDER — ONDANSETRON 4 MG/1
4 TABLET, ORALLY DISINTEGRATING ORAL EVERY 6 HOURS PRN
Status: DISCONTINUED | OUTPATIENT
Start: 2018-09-25 | End: 2018-09-28 | Stop reason: HOSPADM

## 2018-09-25 RX ORDER — CEFAZOLIN SODIUM 2 G/100ML
2 INJECTION, SOLUTION INTRAVENOUS EVERY 8 HOURS
Status: COMPLETED | OUTPATIENT
Start: 2018-09-25 | End: 2018-09-26

## 2018-09-25 RX ORDER — FAMOTIDINE 10 MG/ML
20 INJECTION, SOLUTION INTRAVENOUS ONCE
Status: COMPLETED | OUTPATIENT
Start: 2018-09-25 | End: 2018-09-25

## 2018-09-25 RX ORDER — PROMETHAZINE HYDROCHLORIDE 25 MG/1
25 SUPPOSITORY RECTAL ONCE AS NEEDED
Status: DISCONTINUED | OUTPATIENT
Start: 2018-09-25 | End: 2018-09-25 | Stop reason: HOSPADM

## 2018-09-25 RX ORDER — BUPIVACAINE HYDROCHLORIDE AND EPINEPHRINE 2.5; 5 MG/ML; UG/ML
INJECTION, SOLUTION INFILTRATION; PERINEURAL AS NEEDED
Status: DISCONTINUED | OUTPATIENT
Start: 2018-09-25 | End: 2018-09-25 | Stop reason: HOSPADM

## 2018-09-25 RX ORDER — ONDANSETRON 2 MG/ML
4 INJECTION INTRAMUSCULAR; INTRAVENOUS EVERY 6 HOURS PRN
Status: DISCONTINUED | OUTPATIENT
Start: 2018-09-25 | End: 2018-09-28 | Stop reason: HOSPADM

## 2018-09-25 RX ORDER — KETAMINE HYDROCHLORIDE 10 MG/ML
INJECTION INTRAMUSCULAR; INTRAVENOUS AS NEEDED
Status: DISCONTINUED | OUTPATIENT
Start: 2018-09-25 | End: 2018-09-25 | Stop reason: SURG

## 2018-09-25 RX ORDER — DIAZEPAM 5 MG/1
10 TABLET ORAL ONCE
Status: COMPLETED | OUTPATIENT
Start: 2018-09-25 | End: 2018-09-25

## 2018-09-25 RX ORDER — LIDOCAINE HYDROCHLORIDE 10 MG/ML
0.5 INJECTION, SOLUTION EPIDURAL; INFILTRATION; INTRACAUDAL; PERINEURAL ONCE AS NEEDED
Status: DISCONTINUED | OUTPATIENT
Start: 2018-09-25 | End: 2018-09-25 | Stop reason: HOSPADM

## 2018-09-25 RX ORDER — GABAPENTIN 300 MG/1
600 CAPSULE ORAL 3 TIMES DAILY
Status: DISCONTINUED | OUTPATIENT
Start: 2018-09-25 | End: 2018-09-28 | Stop reason: HOSPADM

## 2018-09-25 RX ORDER — SODIUM CHLORIDE, SODIUM LACTATE, POTASSIUM CHLORIDE, CALCIUM CHLORIDE 600; 310; 30; 20 MG/100ML; MG/100ML; MG/100ML; MG/100ML
20 INJECTION, SOLUTION INTRAVENOUS CONTINUOUS
Status: DISCONTINUED | OUTPATIENT
Start: 2018-09-25 | End: 2018-09-28

## 2018-09-25 RX ORDER — MIDAZOLAM HYDROCHLORIDE 1 MG/ML
1 INJECTION INTRAMUSCULAR; INTRAVENOUS
Status: DISCONTINUED | OUTPATIENT
Start: 2018-09-25 | End: 2018-09-25 | Stop reason: HOSPADM

## 2018-09-25 RX ORDER — CYCLOBENZAPRINE HCL 10 MG
10 TABLET ORAL 3 TIMES DAILY PRN
Status: DISCONTINUED | OUTPATIENT
Start: 2018-09-25 | End: 2018-09-26

## 2018-09-25 RX ORDER — HYDROMORPHONE HCL 110MG/55ML
PATIENT CONTROLLED ANALGESIA SYRINGE INTRAVENOUS AS NEEDED
Status: DISCONTINUED | OUTPATIENT
Start: 2018-09-25 | End: 2018-09-25 | Stop reason: SURG

## 2018-09-25 RX ORDER — ONDANSETRON 4 MG/1
4 TABLET, FILM COATED ORAL EVERY 6 HOURS PRN
Status: DISCONTINUED | OUTPATIENT
Start: 2018-09-25 | End: 2018-09-28 | Stop reason: HOSPADM

## 2018-09-25 RX ORDER — TIZANIDINE 4 MG/1
2 TABLET ORAL EVERY 8 HOURS PRN
Status: DISCONTINUED | OUTPATIENT
Start: 2018-09-25 | End: 2018-09-26

## 2018-09-25 RX ORDER — PROPOFOL 10 MG/ML
VIAL (ML) INTRAVENOUS AS NEEDED
Status: DISCONTINUED | OUTPATIENT
Start: 2018-09-25 | End: 2018-09-25 | Stop reason: SURG

## 2018-09-25 RX ADMIN — HYDROMORPHONE HYDROCHLORIDE 0.5 MG: 2 INJECTION INTRAMUSCULAR; INTRAVENOUS; SUBCUTANEOUS at 12:50

## 2018-09-25 RX ADMIN — MIDAZOLAM HYDROCHLORIDE 0.5 MG: 2 INJECTION, SOLUTION INTRAMUSCULAR; INTRAVENOUS at 16:06

## 2018-09-25 RX ADMIN — FENTANYL CITRATE 50 MCG: 50 INJECTION INTRAMUSCULAR; INTRAVENOUS at 11:45

## 2018-09-25 RX ADMIN — HYDROMORPHONE HYDROCHLORIDE 0.5 MG: 1 INJECTION, SOLUTION INTRAMUSCULAR; INTRAVENOUS; SUBCUTANEOUS at 14:25

## 2018-09-25 RX ADMIN — FENTANYL CITRATE 50 MCG: 50 INJECTION, SOLUTION INTRAMUSCULAR; INTRAVENOUS at 13:39

## 2018-09-25 RX ADMIN — DEXAMETHASONE SODIUM PHOSPHATE 4 MG: 10 INJECTION INTRAMUSCULAR; INTRAVENOUS at 12:00

## 2018-09-25 RX ADMIN — KETAMINE HYDROCHLORIDE 30 MG: 10 INJECTION INTRAMUSCULAR; INTRAVENOUS at 15:54

## 2018-09-25 RX ADMIN — PHENYLEPHRINE HYDROCHLORIDE 100 MCG: 10 INJECTION INTRAVENOUS at 11:10

## 2018-09-25 RX ADMIN — MIDAZOLAM HYDROCHLORIDE 0.5 MG: 1 INJECTION INTRAMUSCULAR; INTRAVENOUS at 16:06

## 2018-09-25 RX ADMIN — CEFAZOLIN SODIUM 2 G: 2 INJECTION, SOLUTION INTRAVENOUS at 11:00

## 2018-09-25 RX ADMIN — SUGAMMADEX 200 MG: 100 INJECTION, SOLUTION INTRAVENOUS at 12:30

## 2018-09-25 RX ADMIN — FAMOTIDINE 20 MG: 10 INJECTION, SOLUTION INTRAVENOUS at 09:20

## 2018-09-25 RX ADMIN — SODIUM CHLORIDE, POTASSIUM CHLORIDE, SODIUM LACTATE AND CALCIUM CHLORIDE 9 ML/HR: 600; 310; 30; 20 INJECTION, SOLUTION INTRAVENOUS at 09:19

## 2018-09-25 RX ADMIN — METHOCARBAMOL 1000 MG: 100 INJECTION INTRAMUSCULAR; INTRAVENOUS at 13:06

## 2018-09-25 RX ADMIN — FENTANYL CITRATE 100 MCG: 50 INJECTION INTRAMUSCULAR; INTRAVENOUS at 11:21

## 2018-09-25 RX ADMIN — EPHEDRINE SULFATE 10 MG: 50 INJECTION INTRAMUSCULAR; INTRAVENOUS; SUBCUTANEOUS at 11:00

## 2018-09-25 RX ADMIN — HYDROMORPHONE HYDROCHLORIDE 0.5 MG: 2 INJECTION INTRAMUSCULAR; INTRAVENOUS; SUBCUTANEOUS at 12:40

## 2018-09-25 RX ADMIN — ROCURONIUM BROMIDE 50 MG: 10 INJECTION INTRAVENOUS at 10:51

## 2018-09-25 RX ADMIN — HYDROMORPHONE HYDROCHLORIDE 0.5 MG: 1 INJECTION, SOLUTION INTRAMUSCULAR; INTRAVENOUS; SUBCUTANEOUS at 13:43

## 2018-09-25 RX ADMIN — OXYCODONE HYDROCHLORIDE AND ACETAMINOPHEN 1 TABLET: 10; 325 TABLET ORAL at 17:13

## 2018-09-25 RX ADMIN — LIDOCAINE HYDROCHLORIDE 60 MG: 20 INJECTION, SOLUTION INFILTRATION; PERINEURAL at 10:51

## 2018-09-25 RX ADMIN — DEXAMETHASONE SODIUM PHOSPHATE 6 MG: 10 INJECTION INTRAMUSCULAR; INTRAVENOUS at 11:15

## 2018-09-25 RX ADMIN — FENTANYL CITRATE 50 MCG: 50 INJECTION, SOLUTION INTRAMUSCULAR; INTRAVENOUS at 15:44

## 2018-09-25 RX ADMIN — CEFAZOLIN SODIUM 2 G: 2 INJECTION, SOLUTION INTRAVENOUS at 18:40

## 2018-09-25 RX ADMIN — SODIUM CHLORIDE, POTASSIUM CHLORIDE, SODIUM LACTATE AND CALCIUM CHLORIDE: 600; 310; 30; 20 INJECTION, SOLUTION INTRAVENOUS at 10:00

## 2018-09-25 RX ADMIN — GABAPENTIN 600 MG: 300 CAPSULE ORAL at 17:34

## 2018-09-25 RX ADMIN — MAGNESIUM SULFATE HEPTAHYDRATE 2 G: 500 INJECTION, SOLUTION INTRAMUSCULAR; INTRAVENOUS at 15:56

## 2018-09-25 RX ADMIN — DIAZEPAM 10 MG: 5 TABLET ORAL at 19:06

## 2018-09-25 RX ADMIN — HYDROMORPHONE HYDROCHLORIDE 0.5 MG: 1 INJECTION, SOLUTION INTRAMUSCULAR; INTRAVENOUS; SUBCUTANEOUS at 15:35

## 2018-09-25 RX ADMIN — FENTANYL CITRATE 100 MCG: 50 INJECTION INTRAMUSCULAR; INTRAVENOUS at 10:51

## 2018-09-25 RX ADMIN — CYCLOBENZAPRINE 10 MG: 10 TABLET, FILM COATED ORAL at 17:13

## 2018-09-25 RX ADMIN — FENTANYL CITRATE 50 MCG: 50 INJECTION, SOLUTION INTRAMUSCULAR; INTRAVENOUS at 13:28

## 2018-09-25 RX ADMIN — PROPOFOL 250 MG: 10 INJECTION, EMULSION INTRAVENOUS at 10:51

## 2018-09-25 RX ADMIN — TIZANIDINE 2 MG: 4 TABLET ORAL at 20:21

## 2018-09-25 RX ADMIN — HYDROMORPHONE HYDROCHLORIDE 0.5 MG: 1 INJECTION, SOLUTION INTRAMUSCULAR; INTRAVENOUS; SUBCUTANEOUS at 14:03

## 2018-09-25 RX ADMIN — OXYCODONE HYDROCHLORIDE AND ACETAMINOPHEN 1 TABLET: 10; 325 TABLET ORAL at 21:08

## 2018-09-25 RX ADMIN — SCOPALAMINE 1 PATCH: 1 PATCH, EXTENDED RELEASE TRANSDERMAL at 09:19

## 2018-09-25 RX ADMIN — MIDAZOLAM HYDROCHLORIDE 2 MG: 2 INJECTION, SOLUTION INTRAMUSCULAR; INTRAVENOUS at 09:20

## 2018-09-25 RX ADMIN — FENTANYL CITRATE 50 MCG: 50 INJECTION, SOLUTION INTRAMUSCULAR; INTRAVENOUS at 14:16

## 2018-09-25 RX ADMIN — EPHEDRINE SULFATE 10 MG: 50 INJECTION INTRAMUSCULAR; INTRAVENOUS; SUBCUTANEOUS at 11:10

## 2018-09-25 RX ADMIN — ATORVASTATIN CALCIUM 20 MG: 20 TABLET, FILM COATED ORAL at 20:21

## 2018-09-25 RX ADMIN — FENTANYL CITRATE 50 MCG: 50 INJECTION INTRAMUSCULAR; INTRAVENOUS at 12:00

## 2018-09-25 RX ADMIN — HYDROMORPHONE HYDROCHLORIDE: 10 INJECTION INTRAMUSCULAR; INTRAVENOUS; SUBCUTANEOUS at 13:09

## 2018-09-25 RX ADMIN — SODIUM CHLORIDE, POTASSIUM CHLORIDE, SODIUM LACTATE AND CALCIUM CHLORIDE 100 ML/HR: 600; 310; 30; 20 INJECTION, SOLUTION INTRAVENOUS at 18:43

## 2018-09-25 NOTE — ADDENDUM NOTE
Addendum  created 09/25/18 4512 by Raymond Kern MD    Anesthesia Intra Meds edited, Visit Navigator Flowsheet section accepted

## 2018-09-25 NOTE — ANESTHESIA PREPROCEDURE EVALUATION
Anesthesia Evaluation     Patient summary reviewed and Nursing notes reviewed                Airway   Mallampati: II  Neck ROM: limited  Dental      Pulmonary    (+) a smoker Former,   Cardiovascular     ECG reviewed  Rhythm: irregular  Rate: normal    (+) hypertension, hyperlipidemia,       Neuro/Psych  (+) headaches, numbness,     GI/Hepatic/Renal/Endo - negative ROS     Musculoskeletal     (+) neck pain,   Abdominal    Substance History - negative use     OB/GYN negative ob/gyn ROS         Other   (+) arthritis                     Anesthesia Plan    ASA 3     general     intravenous induction   Anesthetic plan, all risks, benefits, and alternatives have been provided, discussed and informed consent has been obtained with: patient.

## 2018-09-25 NOTE — ANESTHESIA POSTPROCEDURE EVALUATION
"Patient: Dave Flynn    Procedure Summary     Date:  09/25/18 Room / Location:  [facility] OR [address] [place] [address] MAIN OR    Anesthesia Start:  1047 Anesthesia Stop:  1253    Procedure:  Posterior cervical decompression and fusion at C3/4 with lateral mass screws and rods (Bilateral Spine Cervical) Diagnosis:       Cervical spondylosis with myelopathy      Cervical spinal stenosis      Cervical herniated disc      (Cervical spondylosis with myelopathy [M47.12])      (Cervical spinal stenosis [M48.02])      (Cervical herniated disc [M50.20])    Surgeon:  Fred Martinez MD Provider:  Carlito Cole MD    Anesthesia Type:  general ASA Status:  3          Anesthesia Type: general  Last vitals  BP   (!) 164/104 (09/25/18 1430)   Temp   36.5 °C (97.7 °F) (09/25/18 1255)   Pulse   93 (09/25/18 1430)   Resp   20 (09/25/18 1430)     SpO2   97 % (09/25/18 1430)     Post Anesthesia Care and Evaluation    Patient location during evaluation: bedside  Patient participation: complete - patient participated  Level of consciousness: awake and alert  Pain management: adequate  Airway patency: patent  Anesthetic complications: No anesthetic complications    Cardiovascular status: acceptable  Respiratory status: acceptable  Hydration status: acceptable    Comments: BP (!) 164/104   Pulse 93   Temp 36.5 °C (97.7 °F) (Oral)   Resp 20   Ht 175.3 cm (69.02\")   Wt 79.4 kg (175 lb 2 oz)   SpO2 97%   BMI 25.85 kg/m²       "

## 2018-09-25 NOTE — ANESTHESIA PROCEDURE NOTES
Airway  Urgency: elective    Date/Time: 9/25/2018 10:52 AM  Airway not difficult    General Information and Staff    Patient location during procedure: OR  Anesthesiologist: SANDRITA FOLEY  CRNA: MONA HALE    Indications and Patient Condition  Indications for airway management: airway protection    Preoxygenated: yes  MILS maintained throughout  Mask difficulty assessment: 2 - vent by mask + OA or adjuvant +/- NMBA    Final Airway Details  Final airway type: endotracheal airway      Successful airway: ETT and reinforced tube  Cuffed: yes   Successful intubation technique: direct laryngoscopy  Facilitating devices/methods: intubating stylet  Endotracheal tube insertion site: oral  Blade: Tena  Blade size: 4  ETT size: 7.5 mm  Cormack-Lehane Classification: grade I - full view of glottis  Placement verified by: chest auscultation and capnometry   Measured from: teeth  ETT to teeth (cm): 23  Number of attempts at approach: 1

## 2018-09-26 PROCEDURE — 97110 THERAPEUTIC EXERCISES: CPT

## 2018-09-26 PROCEDURE — 25010000003 CEFAZOLIN IN DEXTROSE 2-4 GM/100ML-% SOLUTION: Performed by: NEUROLOGICAL SURGERY

## 2018-09-26 PROCEDURE — 97161 PT EVAL LOW COMPLEX 20 MIN: CPT

## 2018-09-26 PROCEDURE — 99024 POSTOP FOLLOW-UP VISIT: CPT | Performed by: NURSE PRACTITIONER

## 2018-09-26 RX ORDER — HYDROMORPHONE HCL IN 0.9% NACL 10 MG/50ML
PATIENT CONTROLLED ANALGESIA SYRINGE INTRAVENOUS CONTINUOUS
Status: DISCONTINUED | OUTPATIENT
Start: 2018-09-26 | End: 2018-09-27

## 2018-09-26 RX ORDER — DOCUSATE SODIUM 100 MG/1
200 CAPSULE, LIQUID FILLED ORAL 2 TIMES DAILY
Status: DISCONTINUED | OUTPATIENT
Start: 2018-09-26 | End: 2018-09-28 | Stop reason: HOSPADM

## 2018-09-26 RX ORDER — METHOCARBAMOL 750 MG/1
750 TABLET, FILM COATED ORAL EVERY 8 HOURS SCHEDULED
Status: DISCONTINUED | OUTPATIENT
Start: 2018-09-26 | End: 2018-09-28

## 2018-09-26 RX ADMIN — OXYCODONE HYDROCHLORIDE AND ACETAMINOPHEN 1 TABLET: 10; 325 TABLET ORAL at 01:24

## 2018-09-26 RX ADMIN — DOCUSATE SODIUM 100 MG: 100 CAPSULE, LIQUID FILLED ORAL at 10:07

## 2018-09-26 RX ADMIN — OXYCODONE HYDROCHLORIDE AND ACETAMINOPHEN 1 TABLET: 10; 325 TABLET ORAL at 14:05

## 2018-09-26 RX ADMIN — TIZANIDINE 2 MG: 4 TABLET ORAL at 05:01

## 2018-09-26 RX ADMIN — SODIUM CHLORIDE, POTASSIUM CHLORIDE, SODIUM LACTATE AND CALCIUM CHLORIDE 100 ML/HR: 600; 310; 30; 20 INJECTION, SOLUTION INTRAVENOUS at 01:33

## 2018-09-26 RX ADMIN — GABAPENTIN 600 MG: 300 CAPSULE ORAL at 10:07

## 2018-09-26 RX ADMIN — OXYCODONE HYDROCHLORIDE AND ACETAMINOPHEN 1 TABLET: 10; 325 TABLET ORAL at 10:07

## 2018-09-26 RX ADMIN — DIAZEPAM 5 MG: 5 TABLET ORAL at 03:41

## 2018-09-26 RX ADMIN — CYCLOBENZAPRINE 10 MG: 10 TABLET, FILM COATED ORAL at 10:07

## 2018-09-26 RX ADMIN — GABAPENTIN 600 MG: 300 CAPSULE ORAL at 15:49

## 2018-09-26 RX ADMIN — CEFAZOLIN SODIUM 2 G: 2 INJECTION, SOLUTION INTRAVENOUS at 12:02

## 2018-09-26 RX ADMIN — DIAZEPAM 5 MG: 5 TABLET ORAL at 13:27

## 2018-09-26 RX ADMIN — ATORVASTATIN CALCIUM 20 MG: 20 TABLET, FILM COATED ORAL at 10:06

## 2018-09-26 RX ADMIN — GABAPENTIN 600 MG: 300 CAPSULE ORAL at 21:28

## 2018-09-26 RX ADMIN — CHLORTHALIDONE: 25 TABLET ORAL at 10:06

## 2018-09-26 RX ADMIN — METHOCARBAMOL 750 MG: 750 TABLET ORAL at 18:06

## 2018-09-26 RX ADMIN — OXYCODONE HYDROCHLORIDE AND ACETAMINOPHEN 1 TABLET: 10; 325 TABLET ORAL at 18:05

## 2018-09-26 RX ADMIN — CEFAZOLIN SODIUM 2 G: 2 INJECTION, SOLUTION INTRAVENOUS at 03:41

## 2018-09-26 RX ADMIN — CYCLOBENZAPRINE 10 MG: 10 TABLET, FILM COATED ORAL at 00:05

## 2018-09-26 RX ADMIN — DOCUSATE SODIUM 200 MG: 100 CAPSULE, LIQUID FILLED ORAL at 21:28

## 2018-09-26 RX ADMIN — HYDROMORPHONE HYDROCHLORIDE: 10 INJECTION INTRAMUSCULAR; INTRAVENOUS; SUBCUTANEOUS at 15:45

## 2018-09-26 RX ADMIN — DIAZEPAM 5 MG: 5 TABLET ORAL at 21:28

## 2018-09-26 RX ADMIN — GABAPENTIN 600 MG: 300 CAPSULE ORAL at 00:06

## 2018-09-26 RX ADMIN — OXYCODONE HYDROCHLORIDE AND ACETAMINOPHEN 1 TABLET: 10; 325 TABLET ORAL at 05:35

## 2018-09-27 LAB
DEPRECATED RDW RBC AUTO: 43.4 FL (ref 37–54)
ERYTHROCYTE [DISTWIDTH] IN BLOOD BY AUTOMATED COUNT: 13.7 % (ref 11.5–14.5)
HCT VFR BLD AUTO: 43.9 % (ref 40.4–52.2)
HGB BLD-MCNC: 14.3 G/DL (ref 13.7–17.6)
MCH RBC QN AUTO: 28.5 PG (ref 27–32.7)
MCHC RBC AUTO-ENTMCNC: 32.6 G/DL (ref 32.6–36.4)
MCV RBC AUTO: 87.5 FL (ref 79.8–96.2)
PLATELET # BLD AUTO: 241 10*3/MM3 (ref 140–500)
PMV BLD AUTO: 10.1 FL (ref 6–12)
RBC # BLD AUTO: 5.02 10*6/MM3 (ref 4.6–6)
WBC NRBC COR # BLD: 8.58 10*3/MM3 (ref 4.5–10.7)

## 2018-09-27 PROCEDURE — 99024 POSTOP FOLLOW-UP VISIT: CPT | Performed by: PHYSICIAN ASSISTANT

## 2018-09-27 PROCEDURE — G0008 ADMIN INFLUENZA VIRUS VAC: HCPCS | Performed by: NEUROLOGICAL SURGERY

## 2018-09-27 PROCEDURE — 25010000002 INFLUENZA VAC SUBUNIT QUAD 0.5 ML SUSPENSION PREFILLED SYRINGE: Performed by: NEUROLOGICAL SURGERY

## 2018-09-27 PROCEDURE — 97110 THERAPEUTIC EXERCISES: CPT

## 2018-09-27 PROCEDURE — 90661 CCIIV3 VAC ABX FR 0.5 ML IM: CPT | Performed by: NEUROLOGICAL SURGERY

## 2018-09-27 PROCEDURE — 85027 COMPLETE CBC AUTOMATED: CPT | Performed by: NURSE PRACTITIONER

## 2018-09-27 PROCEDURE — 25010000002 HYDROMORPHONE PER 4 MG: Performed by: NEUROLOGICAL SURGERY

## 2018-09-27 RX ADMIN — GABAPENTIN 600 MG: 300 CAPSULE ORAL at 22:59

## 2018-09-27 RX ADMIN — DOCUSATE SODIUM 200 MG: 100 CAPSULE, LIQUID FILLED ORAL at 22:59

## 2018-09-27 RX ADMIN — METHOCARBAMOL 750 MG: 750 TABLET ORAL at 06:06

## 2018-09-27 RX ADMIN — HYDROMORPHONE HYDROCHLORIDE 1 MG: 1 INJECTION, SOLUTION INTRAMUSCULAR; INTRAVENOUS; SUBCUTANEOUS at 10:37

## 2018-09-27 RX ADMIN — METHOCARBAMOL 750 MG: 750 TABLET ORAL at 23:00

## 2018-09-27 RX ADMIN — DOCUSATE SODIUM 200 MG: 100 CAPSULE, LIQUID FILLED ORAL at 09:27

## 2018-09-27 RX ADMIN — SODIUM CHLORIDE, POTASSIUM CHLORIDE, SODIUM LACTATE AND CALCIUM CHLORIDE 20 ML/HR: 600; 310; 30; 20 INJECTION, SOLUTION INTRAVENOUS at 06:12

## 2018-09-27 RX ADMIN — OXYCODONE HYDROCHLORIDE AND ACETAMINOPHEN 1 TABLET: 10; 325 TABLET ORAL at 06:06

## 2018-09-27 RX ADMIN — HYDROMORPHONE HYDROCHLORIDE 1 MG: 1 INJECTION, SOLUTION INTRAMUSCULAR; INTRAVENOUS; SUBCUTANEOUS at 19:05

## 2018-09-27 RX ADMIN — METHOCARBAMOL 750 MG: 750 TABLET ORAL at 14:16

## 2018-09-27 RX ADMIN — INFLUENZA A VIRUS A/SINGAPORE/GP1908/2015 IVR-180 (H1N1) ANTIGEN (MDCK CELL DERIVED, PROPIOLACTONE INACTIVATED), INFLUENZA A VIRUS A/NORTH CAROLINA/04/2016 (H3N2) HEMAGGLUTININ ANTIGEN (MDCK CELL DERIVED, PROPIOLACTONE INACTIVATED), INFLUENZA B VIRUS B/IOWA/06/2017 HEMAGGLUTININ ANTIGEN (MDCK CELL DERIVED, PROPIOLACTONE INACTIVATED), INFLUENZA B VIRUS B/SINGAPORE/INFTT-16-0610/2016 HEMAGGLUTININ ANTIGEN (MDCK CELL DERIVED, PROPIOLACTONE INACTIVATED) 0.5 ML: 15; 15; 15; 15 INJECTION, SUSPENSION INTRAMUSCULAR at 11:54

## 2018-09-27 RX ADMIN — GABAPENTIN 600 MG: 300 CAPSULE ORAL at 09:27

## 2018-09-27 RX ADMIN — OXYCODONE HYDROCHLORIDE AND ACETAMINOPHEN 1 TABLET: 10; 325 TABLET ORAL at 23:00

## 2018-09-27 RX ADMIN — HYDROMORPHONE HYDROCHLORIDE 1 MG: 1 INJECTION, SOLUTION INTRAMUSCULAR; INTRAVENOUS; SUBCUTANEOUS at 15:09

## 2018-09-27 RX ADMIN — OXYCODONE HYDROCHLORIDE AND ACETAMINOPHEN 1 TABLET: 10; 325 TABLET ORAL at 10:37

## 2018-09-27 RX ADMIN — OXYCODONE HYDROCHLORIDE AND ACETAMINOPHEN 1 TABLET: 10; 325 TABLET ORAL at 19:05

## 2018-09-27 RX ADMIN — OXYCODONE HYDROCHLORIDE AND ACETAMINOPHEN 1 TABLET: 10; 325 TABLET ORAL at 15:09

## 2018-09-27 RX ADMIN — GABAPENTIN 600 MG: 300 CAPSULE ORAL at 17:04

## 2018-09-27 RX ADMIN — CHLORTHALIDONE: 25 TABLET ORAL at 09:27

## 2018-09-27 RX ADMIN — ATORVASTATIN CALCIUM 20 MG: 20 TABLET, FILM COATED ORAL at 09:28

## 2018-09-27 RX ADMIN — OXYCODONE HYDROCHLORIDE AND ACETAMINOPHEN 1 TABLET: 10; 325 TABLET ORAL at 02:02

## 2018-09-28 VITALS
TEMPERATURE: 98 F | OXYGEN SATURATION: 96 % | BODY MASS INDEX: 25.94 KG/M2 | HEART RATE: 96 BPM | WEIGHT: 175.13 LBS | SYSTOLIC BLOOD PRESSURE: 98 MMHG | HEIGHT: 69 IN | RESPIRATION RATE: 18 BRPM | DIASTOLIC BLOOD PRESSURE: 68 MMHG

## 2018-09-28 LAB
BASOPHILS # BLD AUTO: 0.01 10*3/MM3 (ref 0–0.2)
BASOPHILS NFR BLD AUTO: 0.1 % (ref 0–1.5)
DEPRECATED RDW RBC AUTO: 42.3 FL (ref 37–54)
EOSINOPHIL # BLD AUTO: 0.23 10*3/MM3 (ref 0–0.7)
EOSINOPHIL NFR BLD AUTO: 3.4 % (ref 0.3–6.2)
ERYTHROCYTE [DISTWIDTH] IN BLOOD BY AUTOMATED COUNT: 13.4 % (ref 11.5–14.5)
HCT VFR BLD AUTO: 42.5 % (ref 40.4–52.2)
HGB BLD-MCNC: 14 G/DL (ref 13.7–17.6)
IMM GRANULOCYTES # BLD: 0.02 10*3/MM3 (ref 0–0.03)
IMM GRANULOCYTES NFR BLD: 0.3 % (ref 0–0.5)
LYMPHOCYTES # BLD AUTO: 2.34 10*3/MM3 (ref 0.9–4.8)
LYMPHOCYTES NFR BLD AUTO: 34.6 % (ref 19.6–45.3)
MCH RBC QN AUTO: 28.5 PG (ref 27–32.7)
MCHC RBC AUTO-ENTMCNC: 32.9 G/DL (ref 32.6–36.4)
MCV RBC AUTO: 86.4 FL (ref 79.8–96.2)
MONOCYTES # BLD AUTO: 1.13 10*3/MM3 (ref 0.2–1.2)
MONOCYTES NFR BLD AUTO: 16.7 % (ref 5–12)
NEUTROPHILS # BLD AUTO: 3.03 10*3/MM3 (ref 1.9–8.1)
NEUTROPHILS NFR BLD AUTO: 44.9 % (ref 42.7–76)
PLATELET # BLD AUTO: 256 10*3/MM3 (ref 140–500)
PMV BLD AUTO: 10.5 FL (ref 6–12)
RBC # BLD AUTO: 4.92 10*6/MM3 (ref 4.6–6)
WBC NRBC COR # BLD: 6.76 10*3/MM3 (ref 4.5–10.7)

## 2018-09-28 PROCEDURE — 25010000002 HYDROMORPHONE PER 4 MG: Performed by: NEUROLOGICAL SURGERY

## 2018-09-28 PROCEDURE — 85025 COMPLETE CBC W/AUTO DIFF WBC: CPT | Performed by: NEUROLOGICAL SURGERY

## 2018-09-28 RX ORDER — METHOCARBAMOL 750 MG/1
750 TABLET, FILM COATED ORAL 4 TIMES DAILY
Status: DISCONTINUED | OUTPATIENT
Start: 2018-09-28 | End: 2018-09-28

## 2018-09-28 RX ORDER — DIAZEPAM 5 MG/1
TABLET ORAL
Qty: 20 TABLET | Refills: 0
Start: 2018-09-28 | End: 2018-10-10

## 2018-09-28 RX ORDER — OXYCODONE HYDROCHLORIDE AND ACETAMINOPHEN 5; 325 MG/1; MG/1
1 TABLET ORAL EVERY 8 HOURS PRN
Status: DISCONTINUED | OUTPATIENT
Start: 2018-09-28 | End: 2018-09-28 | Stop reason: HOSPADM

## 2018-09-28 RX ORDER — METHOCARBAMOL 500 MG/1
750 TABLET, FILM COATED ORAL EVERY 8 HOURS PRN
Status: DISCONTINUED | OUTPATIENT
Start: 2018-09-28 | End: 2018-09-28 | Stop reason: HOSPADM

## 2018-09-28 RX ORDER — OXYCODONE HYDROCHLORIDE AND ACETAMINOPHEN 5; 325 MG/1; MG/1
1 TABLET ORAL EVERY 8 HOURS PRN
Qty: 40 TABLET | Refills: 0
Start: 2018-09-28 | End: 2018-10-02 | Stop reason: SDUPTHER

## 2018-09-28 RX ORDER — METHOCARBAMOL 750 MG/1
750 TABLET, FILM COATED ORAL EVERY 8 HOURS PRN
Qty: 80 TABLET | Refills: 0 | Status: SHIPPED | OUTPATIENT
Start: 2018-09-28 | End: 2018-10-10

## 2018-09-28 RX ADMIN — CHLORTHALIDONE: 25 TABLET ORAL at 08:09

## 2018-09-28 RX ADMIN — ATORVASTATIN CALCIUM 20 MG: 20 TABLET, FILM COATED ORAL at 08:09

## 2018-09-28 RX ADMIN — HYDROMORPHONE HYDROCHLORIDE 1 MG: 1 INJECTION, SOLUTION INTRAMUSCULAR; INTRAVENOUS; SUBCUTANEOUS at 02:46

## 2018-09-28 RX ADMIN — OXYCODONE HYDROCHLORIDE AND ACETAMINOPHEN 1 TABLET: 10; 325 TABLET ORAL at 10:24

## 2018-09-28 RX ADMIN — DIAZEPAM 5 MG: 5 TABLET ORAL at 12:01

## 2018-09-28 RX ADMIN — DOCUSATE SODIUM 200 MG: 100 CAPSULE, LIQUID FILLED ORAL at 08:09

## 2018-09-28 RX ADMIN — METHOCARBAMOL 750 MG: 750 TABLET ORAL at 05:32

## 2018-09-28 RX ADMIN — HYDROMORPHONE HYDROCHLORIDE 1 MG: 1 INJECTION, SOLUTION INTRAMUSCULAR; INTRAVENOUS; SUBCUTANEOUS at 08:09

## 2018-09-28 RX ADMIN — GABAPENTIN 600 MG: 300 CAPSULE ORAL at 08:09

## 2018-09-28 RX ADMIN — OXYCODONE HYDROCHLORIDE AND ACETAMINOPHEN 1 TABLET: 10; 325 TABLET ORAL at 05:32

## 2018-10-02 DIAGNOSIS — M50.20 CERVICAL HERNIATED DISC: ICD-10-CM

## 2018-10-02 DIAGNOSIS — M47.12 CERVICAL SPONDYLOSIS WITH MYELOPATHY: ICD-10-CM

## 2018-10-02 DIAGNOSIS — M54.2 NECK PAIN: ICD-10-CM

## 2018-10-02 RX ORDER — OXYCODONE HYDROCHLORIDE AND ACETAMINOPHEN 5; 325 MG/1; MG/1
1 TABLET ORAL EVERY 8 HOURS PRN
Qty: 40 TABLET | Refills: 0 | Status: SHIPPED | OUTPATIENT
Start: 2018-10-02 | End: 2018-11-07

## 2018-10-02 NOTE — PROGRESS NOTES
Subjective   Patient ID: Dave Flynn is a 48 y.o. male is here today for follow-up.  He is 2 weeks out from a posterior cervical decompression and fusion C3-4 by Dr. Martinez 9/25/18.  He is doing well.  He states he still having neck and right shoulder pain.  He completed the MDP with relief of some of the shoulder pain.  Mr. Flynn takes Gabapentin 600 mg 2-3 daily, Methocarbamol 750 mg TID and Oxycodone 5/325 QID for pain.     Neck Pain    The quality of the pain is described as aching. The pain is at a severity of 7/10. The pain is moderate. The pain is worse during the day. Stiffness is present at night. Associated symptoms include headaches.       The following portions of the patient's history were reviewed and updated as appropriate: allergies, current medications, past family history, past medical history, past social history, past surgical history and problem list.    Review of Systems   Genitourinary: Negative for difficulty urinating.   Musculoskeletal: Positive for neck pain and neck stiffness.   Neurological: Positive for headaches.   All other systems reviewed and are negative.      Objective   Physical Exam   Neurological:   Incision ok     Neurologic Exam    Assessment/Plan   Independent Review of Radiographic Studies:      Medical Decision Making:    Mr. Flynn is 2 weeks out from a posterior cervical decompression and fusion at C3-C4.  He is making improvement and at this point complains of the expected incisional pain and neck pain.  He denies any significant radicular pain.  His incision is healing well without signs of infection.  I reassured him that the residual pain is very much expected.  He understands that it can take quite a while to see improvement in regards to the strength, gait and balance and sensation changes that he experienced preoperatively.  He is very eager to return to work for financial reasons and we did discuss beginning physical therapy.  We also discussed his activity  restrictions.  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  In the interim he will continue to wean off of his pain medication.  He will follow-up in one month with new x-rays.  Dave was seen today for post-op and neck pain.    Diagnoses and all orders for this visit:    Surgery follow-up examination  -     Ambulatory Referral to Physical Therapy Evaluate and treat (2-3 times per week for 4wks. )  -     XR spine cervical 2 or 3 vw; Future    Other orders  -     cyclobenzaprine (FLEXERIL) 10 MG tablet; Take 1 tablet by mouth 3 (Three) Times a Day As Needed for Muscle Spasms.      Return in about 4 weeks (around 11/7/2018).

## 2018-10-02 NOTE — TELEPHONE ENCOUNTER
Patient's wife called regarding a pain medication refill. He had a C3-4 posterior fusion. He was given Percocet 5/325 mg 1 every 8 hours PRN pain #40 with no refill. I told the patient's wife the message will be sent to Dr. Martinez and when he approves it we will let them know. She states he is completley out of pain medication and I advised her of his post-op instructions which state they should call 4 business days prior to him being out as like today Dr. Martinez is in surgery he might not get to his messages until tomorrow when he is in the office.

## 2018-10-05 ENCOUNTER — TELEPHONE (OUTPATIENT)
Dept: NEUROSURGERY | Facility: CLINIC | Age: 48
End: 2018-10-05

## 2018-10-05 NOTE — TELEPHONE ENCOUNTER
Pam with [facility] called and wanted to know if we could order nystatin for oral thrush and what does she need to do for his right shoulder pain besides heat, ice and meds

## 2018-10-06 NOTE — TELEPHONE ENCOUNTER
The nystatin is fine. I think 15 cc s/s q 6 hrs prn. Can take ibuprofen or alleve for the shoulder pain.

## 2018-10-10 ENCOUNTER — OFFICE VISIT (OUTPATIENT)
Dept: NEUROSURGERY | Facility: CLINIC | Age: 48
End: 2018-10-10

## 2018-10-10 VITALS
DIASTOLIC BLOOD PRESSURE: 94 MMHG | HEART RATE: 81 BPM | SYSTOLIC BLOOD PRESSURE: 134 MMHG | BODY MASS INDEX: 25.92 KG/M2 | HEIGHT: 69 IN | WEIGHT: 175 LBS

## 2018-10-10 DIAGNOSIS — M54.2 NECK PAIN: Primary | ICD-10-CM

## 2018-10-10 DIAGNOSIS — M47.12 CERVICAL SPONDYLOSIS WITH MYELOPATHY: ICD-10-CM

## 2018-10-10 DIAGNOSIS — M50.20 CERVICAL HERNIATED DISC: ICD-10-CM

## 2018-10-10 DIAGNOSIS — M54.2 NECK PAIN: ICD-10-CM

## 2018-10-10 DIAGNOSIS — Z09 SURGERY FOLLOW-UP EXAMINATION: Primary | ICD-10-CM

## 2018-10-10 PROBLEM — M50.30 DEGENERATIVE CERVICAL DISC: Status: RESOLVED | Noted: 2017-08-24 | Resolved: 2018-10-10

## 2018-10-10 PROBLEM — M54.12 CERVICAL RADICULOPATHY: Status: RESOLVED | Noted: 2017-12-27 | Resolved: 2018-10-10

## 2018-10-10 PROBLEM — M48.02 CERVICAL SPINAL STENOSIS: Status: RESOLVED | Noted: 2018-08-20 | Resolved: 2018-10-10

## 2018-10-10 PROBLEM — M48.02 CERVICAL STENOSIS OF SPINE: Status: RESOLVED | Noted: 2018-09-25 | Resolved: 2018-10-10

## 2018-10-10 PROBLEM — M47.812 FACET ARTHROPATHY, CERVICAL: Status: RESOLVED | Noted: 2017-08-24 | Resolved: 2018-10-10

## 2018-10-10 PROCEDURE — 99024 POSTOP FOLLOW-UP VISIT: CPT | Performed by: PHYSICIAN ASSISTANT

## 2018-10-10 RX ORDER — CYCLOBENZAPRINE HCL 10 MG
10 TABLET ORAL 3 TIMES DAILY PRN
Qty: 90 TABLET | Refills: 1 | Status: SHIPPED | OUTPATIENT
Start: 2018-10-10 | End: 2018-11-07 | Stop reason: HOSPADM

## 2018-10-10 RX ORDER — METHYLPREDNISOLONE 4 MG/1
TABLET ORAL
Refills: 0 | COMMUNITY
Start: 2018-10-01 | End: 2018-10-10

## 2018-10-11 RX ORDER — OXYCODONE HYDROCHLORIDE AND ACETAMINOPHEN 5; 325 MG/1; MG/1
1 TABLET ORAL EVERY 8 HOURS PRN
Qty: 40 TABLET | Refills: 0 | Status: SHIPPED | OUTPATIENT
Start: 2018-10-11 | End: 2018-11-07

## 2018-10-22 ENCOUNTER — TELEPHONE (OUTPATIENT)
Dept: NEUROSURGERY | Facility: CLINIC | Age: 48
End: 2018-10-22

## 2018-10-22 NOTE — TELEPHONE ENCOUNTER
Patient called has had 2 scripts of percocet patient complaining of right shoulder pain and pain in back of head is there anything else he can take for pain ? Patient is also using flexeril please advise patient number is [phone]

## 2018-10-23 ENCOUNTER — TREATMENT (OUTPATIENT)
Dept: PHYSICAL THERAPY | Facility: CLINIC | Age: 48
End: 2018-10-23

## 2018-10-23 DIAGNOSIS — M54.2 PAIN, NECK: Primary | ICD-10-CM

## 2018-10-23 DIAGNOSIS — Z98.1 S/P CERVICAL SPINAL FUSION: ICD-10-CM

## 2018-10-23 PROCEDURE — 97110 THERAPEUTIC EXERCISES: CPT | Performed by: PHYSICAL THERAPIST

## 2018-10-23 PROCEDURE — 97162 PT EVAL MOD COMPLEX 30 MIN: CPT | Performed by: PHYSICAL THERAPIST

## 2018-10-23 RX ORDER — TIZANIDINE 2 MG/1
2 TABLET ORAL EVERY 8 HOURS PRN
Qty: 90 TABLET | Refills: 3 | Status: SHIPPED | OUTPATIENT
Start: 2018-10-23 | End: 2018-11-07

## 2018-10-23 NOTE — PROGRESS NOTES
Physical Therapy Initial Evaluation and Plan of Care    Patient: Dave Flynn   : 1970  Diagnosis/ICD-10 Code:  Pain, neck [M54.2]  Referring practitioner: Renetta Chicas PA-C    Subjective Evaluation    History of Present Illness  Mechanism of injury: C3-4 Fusion by Dr. Fred Martinez on 18.   No accident to the neck, just wear & tear.  Prior to Sx I was having pain on the (R) shoulder and neck.  I was having pressure on the spinal cord causing leg weakness and balance deficits.  The symptoms are better since the surgery but I still weak in my legs on steps & being on my feet too long.     PMHX: C4-7 Fusion on 16 by Dr. Martinez.        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      Quality of life: good    Pain  Current pain ratin  At best pain ratin  At worst pain ratin  Location: (R) Side in Cervcail into the top of the (R) shoulder  Quality: sharp and dull ache (pins)  Relieving factors: ice, heat and medications  Aggravating factors: sleeping and stairs (Going to sleep & waking up, wakes through night, laying down, computer > 30 mins)  Progression: improved    Social Support  Lives in: multiple-level home  Lives with: spouse and adult children    Hand dominance: right             Objective       Active Range of Motion   Cervical/Thoracic Spine   Cervical    Flexion: 32 degrees   Extension: 16 degrees with pain  Left lateral flexion: 21 degrees   Right lateral flexion: 18 degrees   Left rotation: 38 degrees   Right rotation: 40 degrees     Strength/Myotome Testing   Cervical Spine     Left   Normal strength    Left Shoulder     Planes of Motion   Flexion: 5   Abduction: 5   External rotation at 0°: 4     Right Shoulder     Planes of Motion   Flexion: 5   Abduction: 5   External rotation at 0°: 4+     Left Elbow   Flexion: 5  Extension: 5    Right Elbow   Flexion: 5  Extension:  4+    Left Wrist/Hand   Wrist extension: 5  Wrist flexion: 5    Right Wrist/Hand   Wrist extension: 5  Wrist flexion: 5    Ambulation     Observational Gait     Additional Observational Gait Details  Mildly ataxic gait with decreased arm swing         Assessment & Plan     Assessment  Impairments: abnormal or restricted ROM, activity intolerance, impaired physical strength, lacks appropriate home exercise program and pain with function  Assessment details: Pt presents to PT with symptoms consistent with C3-4 Fusion and the residual symptoms of the  .  Pt would benefit from skilled PT intervention to address the deficits noted.     Prognosis: good  Functional Limitations: carrying objects and lifting  Goals  Plan Goals: SHORT TERM GOALS:  Time for Goal Achievement: 4 weeks   1.  Patient to be compliant with HEP  2.  Pt to report increased ease to drive and exercise with less pain.  3.  Increased cervical and thoracic segmental mobility to allow for increased ease with driving and ADL's.  4.  Pt. to be independent with CT stabilization exercises to allow for improved posture while in clinic with fatiguing exercises.    LONG TERM GOALS: Time for Goal Achievement: 8 weeks  1.  Pt to score < 26% perceived disability on Neck Index  2.  Pain level < 3/10 at worse with all activities  3.  Increased cervical AROM to WFL to allow for driving and household tasks without restrictions.  4.  Pt able to perform sports, drive, lift and daily activities without complaints of weakness or pain limiting function.        Plan  Therapy options: will be seen for skilled physical therapy services  Planned modality interventions: cryotherapy, electrical stimulation/Russian stimulation, TENS, thermotherapy (hydrocollator packs) and ultrasound  Other planned modality interventions: Dry Needling  Planned therapy interventions: manual therapy, home exercise program, functional ROM exercises, flexibility, neuromuscular re-education, postural  training, soft tissue mobilization, spinal/joint mobilization, strengthening, stretching and body mechanics training  Frequency: 2x week  Duration in visits: 12  Treatment plan discussed with: patient        Manual Therapy:         mins  35149;  Therapeutic Exercise:    10     mins  28533;     Neuromuscular Madan:        mins  23587;    Therapeutic Activity:          mins  45578;     Gait Training:           mins  44073;     Ultrasound:          mins  62380;    Electrical Stimulation:         mins  62648 ( );  Dry Needling          mins self-pay    Timed Treatment:   10   mins   Total Treatment:     60   mins    PT SIGNATURE: James Vance PT   [facility] #[id number]    DATE TREATMENT INITIATED: 10-23-18    Initial Certification   Certification Period: 1-21-19  I certify that the therapy services are furnished while this patient is under my care.  The services outlined above are required by this patient, and will be reviewed every 90 days.     PHYSICIAN: Renetta Chicas PA-C      DATE:     Please sign and return via fax to [phone].. Thank you, [facility].

## 2018-10-29 ENCOUNTER — TREATMENT (OUTPATIENT)
Dept: PHYSICAL THERAPY | Facility: CLINIC | Age: 48
End: 2018-10-29

## 2018-10-29 DIAGNOSIS — Z98.1 S/P CERVICAL SPINAL FUSION: ICD-10-CM

## 2018-10-29 DIAGNOSIS — M54.2 PAIN, NECK: Primary | ICD-10-CM

## 2018-10-29 PROCEDURE — 97110 THERAPEUTIC EXERCISES: CPT | Performed by: PHYSICAL THERAPIST

## 2018-11-06 ENCOUNTER — HOSPITAL ENCOUNTER (OUTPATIENT)
Dept: GENERAL RADIOLOGY | Facility: HOSPITAL | Age: 48
Discharge: HOME | End: 2018-11-06
Admitting: PHYSICIAN ASSISTANT

## 2018-11-06 DIAGNOSIS — Z09 SURGERY FOLLOW-UP EXAMINATION: ICD-10-CM

## 2018-11-06 PROCEDURE — 72040 X-RAY EXAM NECK SPINE 2-3 VW: CPT

## 2018-11-06 NOTE — PROGRESS NOTES
Subjective   Patient ID: Dave Flynn is a 48 y.o. male is here today for follow-up with a new XR. He is 6 weeks out from a posterior cervical decompression and fusion C3-4 by Dr. Martinez 9/25/18. He is currently going to PT twice a week and noticing improved.  He slipped in the shower 10/4/18 and hit his head. He denies any HA, vision changes or dizziness. Mr. Flynn is currently taking Gabapentin 600 mg 2-3 daily, Tizanidine 4 mg BID and Oxycodone 5/325 BID for pain.     Neck Pain    The pain is at a severity of 5/10. The pain is moderate.       The following portions of the patient's history were reviewed and updated as appropriate: allergies, current medications, past family history, past medical history, past social history, past surgical history and problem list.    Review of Systems   Genitourinary: Negative for difficulty urinating.   Musculoskeletal: Positive for neck pain.   All other systems reviewed and are negative.      Objective   Physical Exam   Neurological:   Incision ok     Neurologic Exam    Assessment/Plan   Independent Review of Radiographic Studies:      Medical Decision Making:    Mr Flynn is now 6 weeks out from a posterior cervical decompression and fusion at C3-C4.  He continues to have some neck pain that radiates into the occiput.  He fell in the shower last week and has had increased pain since that time.  No significant radicular pain.  He feels that he is getting stronger with physical therapy but continues to complain of some gait and balance difficulties, similar to preop.  Did review the x-rays which were done yesterday (after the fall).  They look fine.  I reassured him that the hardware position and alignment looks great.  In regards to the pain I will give him a Medrol Dosepak and also refill his Flexeril.  We did try Tiazac and a Jakob previously but the Flexeril seems to work better for him.  He will stop the ties tizanidine and replace it with the Flexeril.  In addition there  was some miscommunication from our office and he ran out of pain medication recently and was under the impression that Dr. Martinez refused to refill it.  However a request never reached Dr. Martinez.  His family physician was kind enough to refill the medication but unfortunately only for twice a day.  The Percocet 5/325 is not helping.  Dr. Martinez did approve refilling the [place] 10/325 one every 8-12 hours as needed.    He will continue with physical therapy.  He will also continue to work part-time, only 4 hours per day.  He will call on Monday if the pain has not improved back to what it was prior to the fall.  Otherwise follow-up in one month.  Dave was seen today for neck pain.    Diagnoses and all orders for this visit:    Surgery follow-up examination  -     cyclobenzaprine (FLEXERIL) 10 MG tablet; Take 1 tablet by mouth 3 (Three) Times a Day As Needed for Muscle Spasms.  -     MethylPREDNISolone (MEDROL, RICARDA,) 4 MG tablet; Take as directed on package instructions.      Return in about 4 weeks (around 12/5/2018).

## 2018-11-07 ENCOUNTER — OFFICE VISIT (OUTPATIENT)
Dept: NEUROSURGERY | Facility: CLINIC | Age: 48
End: 2018-11-07

## 2018-11-07 VITALS
SYSTOLIC BLOOD PRESSURE: 136 MMHG | BODY MASS INDEX: 25.92 KG/M2 | HEIGHT: 69 IN | DIASTOLIC BLOOD PRESSURE: 93 MMHG | WEIGHT: 175 LBS | HEART RATE: 72 BPM

## 2018-11-07 DIAGNOSIS — Z09 SURGERY FOLLOW-UP EXAMINATION: Primary | ICD-10-CM

## 2018-11-07 PROCEDURE — 99024 POSTOP FOLLOW-UP VISIT: CPT | Performed by: PHYSICIAN ASSISTANT

## 2018-11-07 RX ORDER — HYDROCODONE BITARTRATE AND ACETAMINOPHEN 10; 325 MG/1; MG/1
1 TABLET ORAL EVERY 8 HOURS PRN
COMMUNITY
Start: 2018-11-07 | End: 2018-12-07

## 2018-11-07 RX ORDER — METHYLPREDNISOLONE 4 MG/1
TABLET ORAL
Qty: 1 EACH | Refills: 0 | Status: SHIPPED | OUTPATIENT
Start: 2018-11-07 | End: 2018-12-07

## 2018-11-07 RX ORDER — CYCLOBENZAPRINE HCL 10 MG
10 TABLET ORAL 3 TIMES DAILY PRN
Qty: 90 TABLET | Refills: 1 | Status: SHIPPED | OUTPATIENT
Start: 2018-11-07 | End: 2018-12-13 | Stop reason: HOSPADM

## 2018-11-21 RX ORDER — GABAPENTIN 600 MG/1
600 TABLET ORAL 3 TIMES DAILY
Qty: 90 TABLET | Refills: 0 | Status: SHIPPED | OUTPATIENT
Start: 2018-11-21 | End: 2018-12-14 | Stop reason: SDUPTHER

## 2018-11-21 RX ORDER — HYDROCODONE BITARTRATE AND ACETAMINOPHEN 10; 325 MG/1; MG/1
1 TABLET ORAL EVERY 8 HOURS PRN
Qty: 50 TABLET | Refills: 0 | Status: SHIPPED | OUTPATIENT
Start: 2018-11-21 | End: 2018-12-07

## 2018-11-21 NOTE — TELEPHONE ENCOUNTER
Go ahead and refill both medicines, same doses and amounts. He has been seeing mike. Remind her to talk to me when she sees him next.

## 2018-11-21 NOTE — TELEPHONE ENCOUNTER
Pt called with an update.  He said he is still having sharp pain in neck and shoulder and trouble sleeping. He asked to have another MDP and refills on Hydrocodone 10/325 and Gabapentin 600 mg TID.  I let him know that he just completed a MDP so we would not be able to give him another one but I would ask about Gabapentin 600 mg TID an Hydrocodone 10/325 refills.

## 2018-12-07 DIAGNOSIS — M54.2 NECK PAIN: ICD-10-CM

## 2018-12-07 DIAGNOSIS — M50.31 DEGENERATION OF INTERVERTEBRAL DISC OF HIGH CERVICAL REGION: Primary | ICD-10-CM

## 2018-12-07 RX ORDER — METHYLPREDNISOLONE 4 MG/1
TABLET ORAL
Qty: 21 EACH | Refills: 0 | Status: SHIPPED | OUTPATIENT
Start: 2018-12-07 | End: 2018-12-14

## 2018-12-07 RX ORDER — HYDROCODONE BITARTRATE AND ACETAMINOPHEN 10; 325 MG/1; MG/1
1 TABLET ORAL EVERY 8 HOURS PRN
Qty: 50 TABLET | Refills: 0 | Status: SHIPPED | OUTPATIENT
Start: 2018-12-07 | End: 2018-12-14 | Stop reason: SDUPTHER

## 2018-12-07 NOTE — TELEPHONE ENCOUNTER
Ok, on both, but tell him once he sees Dr. Delacruz, they will take over the pain medication prescribing. Same dose, amount, strength as before on pain medications, no refills

## 2018-12-07 NOTE — TELEPHONE ENCOUNTER
MDP and Hydrocodone 10/325 #50 sent to pharmacy.  Pt notified that this will be his last refill from our office.  He has been referred to Dr. Delacruz for pain management.

## 2018-12-07 NOTE — TELEPHONE ENCOUNTER
I called pt reschedule him for today because of Provider being out of the office. I put a referral in for him to see Dr. Delacruz for pain management.  He said he is still having sharp pain in neck, occipital and arm.  He would like to know if he could have a MDP and refill of Norco 10/325?

## 2018-12-12 NOTE — PROGRESS NOTES
Subjective   Patient ID: Dave Flynn is a 48 y.o. male is here today for follow-up. He is 11 weeks out from a posterior cervical decompression and fusion at C3-C4 by Dr. Martinez 9/25/18. He is scheduled to see Dr. Delacruz 12/14/18 for pain management. Patient reports pain in the right side of his neck that goes into his right arm.        Arm Pain    The pain is present in the right shoulder, right fingers and left fingers. The quality of the pain is described as aching and shooting. The pain radiates to the right neck. The pain is at a severity of 5/10. The pain is moderate. Associated symptoms include tingling. Pertinent negatives include no chest pain.       The following portions of the patient's history were reviewed and updated as appropriate: allergies, current medications, past family history, past medical history, past social history, past surgical history and problem list.    Review of Systems   Respiratory: Negative for chest tightness and shortness of breath.    Cardiovascular: Negative for chest pain.   Genitourinary: Negative for difficulty urinating.   Musculoskeletal: Positive for neck pain.   Neurological: Positive for tingling. Negative for weakness.   All other systems reviewed and are negative.      Objective   Physical Exam   Constitutional: He is oriented to person, place, and time. He appears well-developed and well-nourished.   HENT:   Head: Normocephalic and atraumatic.   Right Ear: External ear normal.   Left Ear: External ear normal.   Eyes: Conjunctivae and EOM are normal. Pupils are equal, round, and reactive to light. Right eye exhibits no discharge. Left eye exhibits no discharge.   Neck: Normal range of motion. Neck supple. No tracheal deviation present.   Pulmonary/Chest: Effort normal. No stridor. No respiratory distress.   Musculoskeletal: Normal range of motion. He exhibits no edema, tenderness or deformity.   Neurological: He is alert and oriented to person, place, and time. He  has normal strength and normal reflexes. He displays no atrophy, no tremor and normal reflexes. No cranial nerve deficit or sensory deficit. He exhibits normal muscle tone. He displays a negative Romberg sign. He displays no seizure activity. Coordination and gait normal.   No long tract signs   Skin: Skin is warm and dry.   Psychiatric: He has a normal mood and affect. His behavior is normal. Judgment and thought content normal.   Nursing note and vitals reviewed.    Neurologic Exam     Mental Status   Oriented to person, place, and time.     Cranial Nerves     CN III, IV, VI   Pupils are equal, round, and reactive to light.  Extraocular motions are normal.     Motor Exam     Strength   Strength 5/5 throughout.       Assessment/Plan   Independent Review of Radiographic Studies:      Medical Decision Making:    Mr Flynn is almost 11 weeks out from a posterior cervical decompression and fusion at C3-C4.  He has done very well but continues to have some right sided neck pain that radiates into the right shoulder.  It is much less severe than it was preoperatively.  He understands that the neck pain will continue to improve but will never likely resolve.  He has had extensive surgery now both anteriorly and posteriorly.  He sees Dr. Delacruz tomorrow for pain management.  He is aware of the risk of adjacent level disease, particularly at C2-C3 and C7-T1.  He will call with any questions or concerns and otherwise follow-up in 3 months with new x-rays.  He may return to regular duty work on January 7 other than no lifting more than 10 pounds.  Dave was seen today for post-op.    Diagnoses and all orders for this visit:    Surgery follow-up examination  -     XR spine cervical 2 or 3 vw; Future      Return in about 3 months (around 3/13/2019) for with Dr. Martinez.

## 2018-12-13 ENCOUNTER — OFFICE VISIT (OUTPATIENT)
Dept: NEUROSURGERY | Facility: CLINIC | Age: 48
End: 2018-12-13

## 2018-12-13 VITALS
SYSTOLIC BLOOD PRESSURE: 132 MMHG | HEART RATE: 78 BPM | BODY MASS INDEX: 25.83 KG/M2 | HEIGHT: 69 IN | DIASTOLIC BLOOD PRESSURE: 80 MMHG

## 2018-12-13 DIAGNOSIS — Z09 SURGERY FOLLOW-UP EXAMINATION: Primary | ICD-10-CM

## 2018-12-13 PROCEDURE — 99024 POSTOP FOLLOW-UP VISIT: CPT | Performed by: PHYSICIAN ASSISTANT

## 2018-12-13 RX ORDER — TIZANIDINE 2 MG/1
TABLET ORAL
COMMUNITY
Start: 2018-12-03 | End: 2019-02-14 | Stop reason: SDUPTHER

## 2018-12-14 ENCOUNTER — OFFICE VISIT (OUTPATIENT)
Dept: PAIN MEDICINE | Facility: CLINIC | Age: 48
End: 2018-12-14

## 2018-12-14 VITALS
BODY MASS INDEX: 26.66 KG/M2 | HEART RATE: 88 BPM | DIASTOLIC BLOOD PRESSURE: 83 MMHG | SYSTOLIC BLOOD PRESSURE: 120 MMHG | TEMPERATURE: 97.9 F | WEIGHT: 180 LBS | RESPIRATION RATE: 16 BRPM | HEIGHT: 69 IN | OXYGEN SATURATION: 96 %

## 2018-12-14 DIAGNOSIS — M96.1 POSTLAMINECTOMY SYNDROME, CERVICAL REGION: ICD-10-CM

## 2018-12-14 DIAGNOSIS — M54.2 NECK PAIN: ICD-10-CM

## 2018-12-14 DIAGNOSIS — Z51.81 ENCOUNTER FOR MONITORING OPIOID MAINTENANCE THERAPY: ICD-10-CM

## 2018-12-14 DIAGNOSIS — G89.29 OTHER CHRONIC PAIN: Primary | ICD-10-CM

## 2018-12-14 DIAGNOSIS — Z79.891 ENCOUNTER FOR MONITORING OPIOID MAINTENANCE THERAPY: ICD-10-CM

## 2018-12-14 LAB
POC AMPHETAMINES: NEGATIVE
POC BARBITURATES: NEGATIVE
POC BENZODIAZEPHINES: NEGATIVE
POC COCAINE: NEGATIVE
POC METHADONE: NEGATIVE
POC METHAMPHETAMINE SCREEN URINE: NEGATIVE
POC OPIATES: POSITIVE
POC OXYCODONE: NEGATIVE
POC PHENCYCLIDINE: NEGATIVE
POC PROPOXYPHENE: NEGATIVE
POC THC: NEGATIVE
POC TRICYCLIC ANTIDEPRESSANTS: NEGATIVE

## 2018-12-14 PROCEDURE — 80305 DRUG TEST PRSMV DIR OPT OBS: CPT | Performed by: NURSE PRACTITIONER

## 2018-12-14 PROCEDURE — 99213 OFFICE O/P EST LOW 20 MIN: CPT | Performed by: NURSE PRACTITIONER

## 2018-12-14 RX ORDER — HYDROCODONE BITARTRATE AND ACETAMINOPHEN 10; 325 MG/1; MG/1
1 TABLET ORAL EVERY 8 HOURS PRN
Qty: 90 TABLET | Refills: 0 | Status: SHIPPED | OUTPATIENT
Start: 2018-12-14 | End: 2019-01-16 | Stop reason: DRUGHIGH

## 2018-12-14 RX ORDER — GABAPENTIN 600 MG/1
600 TABLET ORAL 3 TIMES DAILY
Qty: 90 TABLET | Refills: 5 | Status: SHIPPED | OUTPATIENT
Start: 2018-12-14 | End: 2019-08-14 | Stop reason: SDUPTHER

## 2018-12-14 NOTE — PROGRESS NOTES
CHIEF COMPLAINT  Pt states his neck and R shoulder pain has moderately improved since cervical surgery on 9/25/18.    Subjective   Dave Flynn is a 48 y.o. male  who presents to the office for follow-up.He has a history of neck pain.    Patient was last seen in our office on 8/30/2018.  This was follow-up for his neck pain.  At that point time the patient was being treated with Percocet 10/325 3-4 times daily, gabapentin 600 mg 3 times a day, anti-tizanidine 3 times a day when necessary.  Since that office visit the patient has completed a posterior cervical decompression and fusion at C3-C4 with Dr. Fred lee on 9/25/2018.  He has followed up with neurosurgery in regards to his neck pain since this time.  He was last seen and the neurosurgery office yesterday.  I have reviewed [facility] De La [facility]re note dated 12/13/2018 which states that the patient's pain is much less severe than preoperative, reassured patient that his pain should continue to improve.  Recommend that he follow-up with pain management as scheduled.    He is currently taking Hydrocodone 10/325 2-3/day, Gabapentin 600 mg tid, tizanidine 2 mg prn.  Repots moderate pain reduction. Denies adverse reactions.      He has completed physical therapy.  Continues with home exercise program.      Neck Pain    This is a chronic problem. The current episode started more than 1 year ago. The problem occurs daily. The problem has been gradually improving. The pain is associated with nothing. The pain is present in the left side, right side, midline and occipital region. The quality of the pain is described as aching and burning. The pain is at a severity of 3/10. The pain is severe. The symptoms are aggravated by position and twisting. Associated symptoms include numbness (hands bilat.) and weakness (hands bilat.occasionally). Pertinent negatives include no chest pain, fever, headaches (back of the head) or photophobia. He has tried NSAIDs, neck support,  "muscle relaxants, ice, heat, home exercises and acetaminophen (Percocet) for the symptoms. The treatment provided moderate relief.      PEG Assessment   What number best describes your pain on average in the past week?7  What number best describes how, during the past week, pain has interfered with your enjoyment of life?7  What number best describes how, during the past week, pain has interfered with your general activity?  7    The following portions of the patient's history were reviewed and updated as appropriate: allergies, current medications, past family history, past medical history, past social history, past surgical history and problem list.    Review of Systems   Constitutional: Positive for activity change (restricted). Negative for appetite change and fever.   HENT: Negative for ear pain, hearing loss and mouth sores.    Eyes: Negative for photophobia and pain.   Respiratory: Negative for apnea, cough, choking and shortness of breath.    Cardiovascular: Negative for chest pain.   Gastrointestinal: Negative for abdominal pain, constipation, diarrhea and nausea.   Genitourinary: Negative for difficulty urinating, dysuria, flank pain and hematuria.   Musculoskeletal: Positive for neck pain and neck stiffness. Negative for back pain.   Neurological: Positive for dizziness (pt states every morning), weakness (hands bilat.occasionally) and numbness (hands bilat.). Negative for seizures, light-headedness and headaches (back of the head).   Psychiatric/Behavioral: Positive for sleep disturbance. Negative for agitation, confusion, hallucinations, self-injury and suicidal ideas.     Vitals:    12/14/18 1446   BP: 120/83   Pulse: 88   Resp: 16   Temp: 97.9 °F (36.6 °C)   SpO2: 96%   Weight: 81.6 kg (180 lb)   Height: 175.3 cm (69.02\")   PainSc:   3   PainLoc: Neck  Comment: neck pain ranges from 3-8/10     Objective   Physical Exam   Constitutional: He is oriented to person, place, and time. He appears " well-developed and well-nourished. No distress.   HENT:   Head: Normocephalic and atraumatic.   Eyes: Conjunctivae are normal.   Neck: Trachea normal. Neck supple. No muscular tenderness present. Decreased range of motion present.   Cardiovascular: Normal rate.   Pulmonary/Chest: Effort normal. No respiratory distress.   Musculoskeletal:        Cervical back: He exhibits decreased range of motion, tenderness, pain and spasm. He exhibits no bony tenderness and no edema.   Surgical incision posterior cervical well healed    Neurological: He is alert and oriented to person, place, and time. No cranial nerve deficit. Gait normal.   Psychiatric: He has a normal mood and affect.   Nursing note and vitals reviewed.    Assessment/Plan   Dave was seen today for neck pain.    Diagnoses and all orders for this visit:    Other chronic pain  -     Urine Drug Screen Confirmation - Urine, Clean Catch; Future  -     POC Urine Drug Screen, Triage    Neck pain  -     Urine Drug Screen Confirmation - Urine, Clean Catch; Future  -     POC Urine Drug Screen, Triage    Postlaminectomy syndrome, cervical region  -     Urine Drug Screen Confirmation - Urine, Clean Catch; Future  -     POC Urine Drug Screen, Triage    Encounter for monitoring opioid maintenance therapy  -     Urine Drug Screen Confirmation - Urine, Clean Catch; Future  -     POC Urine Drug Screen, Triage    Other orders  -     HYDROcodone-acetaminophen (NORCO)  MG per tablet; Take 1 tablet by mouth Every 8 (Eight) Hours As Needed for Moderate Pain  (Take one every 8-12 hour prn pain).  -     gabapentin (NEURONTIN) 600 MG tablet; Take 1 tablet by mouth 3 (Three) Times a Day.      --- Refill Hydrocodone, keep at current dose for now, hope to begin weaning down by next month as he will be outside of the 90 day post op window at that time. Patient appears stable with current regimen. No adverse effects. Regarding continuation of opioids, there is no evidence of aberrant  behavior or any red flags.  The patient continues with appropriate response to opioid therapy. ADL's remain intact by self.   --- The urine drug screen confirmation from 4/4/18 has been reviewed and the result is appropriate based on patient history and SINAN report  --- Routine UDS in office today as part of monitoring requirements for controlled substances.  The specimen was viewed and the immunoassay result reviewed and is +OPI.  This specimen will be sent to [facility] laboratory for confirmation.     --- Follow-up 1 month          [facility] REPORT    As part of the patient's treatment plan, I am prescribing controlled substances. The patient has been made aware of appropriate use of such medications, including potential risk of somnolence, limited ability to drive and/or work safely, and the potential for dependence or overdose. It has also bee made clear that these medications are for use by this patient only, without concomitant use of alcohol or other substances unless prescribed.     Patient has completed prescribing agreement detailing terms of continued prescribing of controlled substances, including monitoring SINAN reports, urine drug screening, and pill counts if necessary. The patient is aware that inappropriate use will results in cessation of prescribing such medications.    SINAN report has been reviewed and scanned into the patient's chart.    As the clinician, I personally reviewed the SINAN from 12/12/2018 while the patient was in the office today.    History and physical exam exhibit continued safe and appropriate use of controlled substances.    EMR Dragon/Transcription disclaimer:   Much of this encounter note is an electronic transcription/translation of spoken language to printed text. The electronic translation of spoken language may permit erroneous, or at times, nonsensical words or phrases to be inadvertently transcribed; Although I have reviewed the note for such errors, some may  still exist.

## 2018-12-19 ENCOUNTER — RESULTS ENCOUNTER (OUTPATIENT)
Dept: PAIN MEDICINE | Facility: CLINIC | Age: 48
End: 2018-12-19

## 2018-12-19 DIAGNOSIS — G89.29 OTHER CHRONIC PAIN: ICD-10-CM

## 2018-12-19 DIAGNOSIS — M54.2 NECK PAIN: ICD-10-CM

## 2018-12-19 DIAGNOSIS — M96.1 POSTLAMINECTOMY SYNDROME, CERVICAL REGION: ICD-10-CM

## 2018-12-19 DIAGNOSIS — Z79.891 ENCOUNTER FOR MONITORING OPIOID MAINTENANCE THERAPY: ICD-10-CM

## 2018-12-19 DIAGNOSIS — Z51.81 ENCOUNTER FOR MONITORING OPIOID MAINTENANCE THERAPY: ICD-10-CM

## 2019-01-16 ENCOUNTER — OFFICE VISIT (OUTPATIENT)
Dept: PAIN MEDICINE | Facility: CLINIC | Age: 49
End: 2019-01-16

## 2019-01-16 VITALS
DIASTOLIC BLOOD PRESSURE: 78 MMHG | HEART RATE: 95 BPM | BODY MASS INDEX: 28.14 KG/M2 | TEMPERATURE: 98.2 F | HEIGHT: 69 IN | SYSTOLIC BLOOD PRESSURE: 114 MMHG | OXYGEN SATURATION: 97 % | RESPIRATION RATE: 16 BRPM | WEIGHT: 190 LBS

## 2019-01-16 DIAGNOSIS — G89.29 OTHER CHRONIC PAIN: Primary | ICD-10-CM

## 2019-01-16 DIAGNOSIS — M50.320 DEGENERATION OF INTERVERTEBRAL DISC OF MID-CERVICAL REGION, UNSPECIFIED SPINAL LEVEL: ICD-10-CM

## 2019-01-16 DIAGNOSIS — Z79.891 ENCOUNTER FOR MONITORING OPIOID MAINTENANCE THERAPY: ICD-10-CM

## 2019-01-16 DIAGNOSIS — Z51.81 ENCOUNTER FOR MONITORING OPIOID MAINTENANCE THERAPY: ICD-10-CM

## 2019-01-16 DIAGNOSIS — M96.1 POSTLAMINECTOMY SYNDROME, CERVICAL REGION: ICD-10-CM

## 2019-01-16 PROCEDURE — 99214 OFFICE O/P EST MOD 30 MIN: CPT | Performed by: NURSE PRACTITIONER

## 2019-01-16 RX ORDER — HYDROCODONE BITARTRATE AND ACETAMINOPHEN 7.5; 325 MG/1; MG/1
1 TABLET ORAL EVERY 6 HOURS PRN
Qty: 105 TABLET | Refills: 0 | Status: SHIPPED | OUTPATIENT
Start: 2019-01-16 | End: 2019-02-14 | Stop reason: SDUPTHER

## 2019-01-16 NOTE — PROGRESS NOTES
"CHIEF COMPLAINT  Pt here today for 5 wk follow up on neck and Right shoulder pain. Pt states the pain has gotten better since last OV for neck and shoulder.      Subjective   Dave Flynn is a 48 y.o. male  who presents to the office for follow-up.He has a history of chronic neck and shoulder pain. Had posterior cervical decompression and fusion C3-4 by Dr. Martinez 9/25/18 . REports his pain is improving overall.    Complains of pain in his neck and right shoulder. Today his pain is 4/10VAS. Describes the pain as less continuous aching. Pain is worse upon awakening and right before sleep. Pain increases with walking a lot, working; pain decreases with medication and rest. He returned to work part-time 3 weeks after surgery and full-time 1-7-19. Continues with Hydrocodone 10/325 2-3/day, Gabapentin 600 mg BID, and tizanidine 2 mg at night. Is having some dizziness but is unsure of cause. He notes is it not the pain medication. The dizziness only occurs upon awakening. The regimen helps decrease his pain by 50%. ADL's by self. \"It brings it down to a comfortable level.\"     Neck Pain    This is a chronic problem. The current episode started more than 1 year ago. The problem occurs constantly. The problem has been gradually worsening (improving since surgery). The quality of the pain is described as aching, shooting and stabbing. The pain is at a severity of 4/10. The pain is moderate. The symptoms are aggravated by position, stress and twisting. Associated symptoms include numbness (hands bilat.) and weakness (hands bilat.occasionally). Pertinent negatives include no chest pain, fever, headaches (back of the head) or photophobia. He has tried oral narcotics (gabapentin; planning for surgery) for the symptoms.      PEG Assessment   What number best describes your pain on average in the past week?4  What number best describes how, during the past week, pain has interfered with your enjoyment of life?4  What number " "best describes how, during the past week, pain has interfered with your general activity?  4    The following portions of the patient's history were reviewed and updated as appropriate: allergies, current medications, past family history, past medical history, past social history, past surgical history and problem list.    Review of Systems   Constitutional: Positive for activity change (restricted). Negative for appetite change and fever.   HENT: Negative for ear pain, hearing loss and mouth sores.    Eyes: Negative for photophobia and pain.   Respiratory: Negative for apnea, cough, choking and shortness of breath.    Cardiovascular: Negative for chest pain.   Gastrointestinal: Negative for abdominal pain, constipation, diarrhea and nausea.   Genitourinary: Negative for difficulty urinating, dysuria, flank pain and hematuria.   Musculoskeletal: Positive for neck pain and neck stiffness. Negative for back pain.   Neurological: Positive for dizziness (pt states every morning), weakness (hands bilat.occasionally) and numbness (hands bilat.). Negative for seizures, light-headedness and headaches (back of the head).   Psychiatric/Behavioral: Positive for sleep disturbance. Negative for agitation, confusion, hallucinations, self-injury and suicidal ideas.       Vitals:    01/16/19 1506   BP: 114/78   Pulse: 95   Resp: 16   Temp: 98.2 °F (36.8 °C)   SpO2: 97%   Weight: 86.2 kg (190 lb)   Height: 175.3 cm (69\")   PainSc: 4  Comment: R shoulder   PainLoc: Neck     Objective   Physical Exam   Constitutional: He is oriented to person, place, and time. Vital signs are normal. He appears well-developed and well-nourished. He is cooperative.   HENT:   Head: Normocephalic and atraumatic.   Nose: Nose normal.   Eyes: Conjunctivae and lids are normal.   Neck: Muscular tenderness present. No spinous process tenderness present. Decreased range of motion present.   Posterior surgical scar   Cardiovascular: Normal rate. "   Pulmonary/Chest: Effort normal.   Abdominal: Normal appearance.   Neurological: He is alert and oriented to person, place, and time. Gait normal.   Skin: Skin is warm, dry and intact.   Psychiatric: He has a normal mood and affect. His speech is normal and behavior is normal. Judgment and thought content normal. Cognition and memory are normal.   Nursing note and vitals reviewed.      Assessment/Plan   Dave was seen today for neck pain and shoulder pain.    Diagnoses and all orders for this visit:    Other chronic pain    Postlaminectomy syndrome, cervical region    Degeneration of intervertebral disc of mid-cervical region, unspecified spinal level    Encounter for monitoring opioid maintenance therapy    Other orders  -     HYDROcodone-acetaminophen (NORCO) 7.5-325 MG per tablet; Take 1 tablet by mouth Every 6 (Six) Hours As Needed for Moderate Pain .      --- The urine drug screen confirmation from 12-18-18 has been reviewed and the result is APPROPRIATE based on patient history and [facility] report  --- discussed discontinuing Tizanidine for a few days and notice if dizziness improves.  --- Decrease to Hydrocodone 7.5 q6-8 hrs PRN (3.5/day). Patient is wanting to try weaning down a little now. .Discussed medication with the patient.  Included in this discussion was the potential for side effects and adverse events.  Patient verbalized understanding and wished to proceed.  Prescription will be sent to pharmacy.  --- Follow-up 1 month or sooenr if needed.     [facility] REPORT    As part of the patient's treatment plan, I am prescribing controlled substances. The patient has been made aware of appropriate use of such medications, including potential risk of somnolence, limited ability to drive and/or work safely, and the potential for dependence or overdose. It has also bee made clear that these medications are for use by this patient only, without concomitant use of alcohol or other substances unless prescribed.      Patient has completed prescribing agreement detailing terms of continued prescribing of controlled substances, including monitoring [facility]AN reports, urine drug screening, and pill counts if necessary. The patient is aware that inappropriate use will results in cessation of prescribing such medications.    [facility] report has been reviewed and scanned into the patient's chart.    As the clinician, I personally reviewed the SINAN from 1-15-19 while the patient was in the office today.    History and physical exam exhibit continued safe and appropriate use of controlled substances.      EMR Dragon/Transcription disclaimer:   Much of this encounter note is an electronic transcription/translation of spoken language to printed text. The electronic translation of spoken language may permit erroneous, or at times, nonsensical words or phrases to be inadvertently transcribed; Although I have reviewed the note for such errors, some may still exist.

## 2019-02-14 ENCOUNTER — OFFICE VISIT (OUTPATIENT)
Dept: PAIN MEDICINE | Facility: CLINIC | Age: 49
End: 2019-02-14

## 2019-02-14 VITALS
SYSTOLIC BLOOD PRESSURE: 133 MMHG | TEMPERATURE: 97.9 F | DIASTOLIC BLOOD PRESSURE: 85 MMHG | HEIGHT: 69 IN | WEIGHT: 183 LBS | RESPIRATION RATE: 16 BRPM | BODY MASS INDEX: 27.11 KG/M2 | HEART RATE: 100 BPM | OXYGEN SATURATION: 95 %

## 2019-02-14 DIAGNOSIS — M96.1 POSTLAMINECTOMY SYNDROME, CERVICAL REGION: ICD-10-CM

## 2019-02-14 DIAGNOSIS — Z51.81 ENCOUNTER FOR MONITORING OPIOID MAINTENANCE THERAPY: ICD-10-CM

## 2019-02-14 DIAGNOSIS — G89.29 OTHER CHRONIC PAIN: Primary | ICD-10-CM

## 2019-02-14 DIAGNOSIS — Z79.891 ENCOUNTER FOR MONITORING OPIOID MAINTENANCE THERAPY: ICD-10-CM

## 2019-02-14 DIAGNOSIS — M50.320 DEGENERATION OF INTERVERTEBRAL DISC OF MID-CERVICAL REGION, UNSPECIFIED SPINAL LEVEL: ICD-10-CM

## 2019-02-14 PROCEDURE — 99214 OFFICE O/P EST MOD 30 MIN: CPT | Performed by: NURSE PRACTITIONER

## 2019-02-14 RX ORDER — TIZANIDINE 2 MG/1
2 TABLET ORAL NIGHTLY PRN
Qty: 30 TABLET | Refills: 1 | Status: SHIPPED | OUTPATIENT
Start: 2019-02-14 | End: 2019-03-13 | Stop reason: ALTCHOICE

## 2019-02-14 RX ORDER — HYDROCODONE BITARTRATE AND ACETAMINOPHEN 7.5; 325 MG/1; MG/1
1 TABLET ORAL EVERY 8 HOURS PRN
Qty: 90 TABLET | Refills: 0 | Status: SHIPPED | OUTPATIENT
Start: 2019-02-14 | End: 2019-03-18 | Stop reason: SDUPTHER

## 2019-02-14 RX ORDER — TIZANIDINE 2 MG/1
4 TABLET ORAL 2 TIMES DAILY PRN
Qty: 60 TABLET | Refills: 2 | Status: CANCELLED | OUTPATIENT
Start: 2019-02-14

## 2019-03-08 NOTE — PROGRESS NOTES
Subjective   Patient ID: Dave Flynn is a 49 y.o. male is here today for 3 month follow-up with cervical spine plain films done today.  He is seeing Dr Delacruz for pain management and was last seen 2.14.19.  He has not had procedures, he is to be seen again this Friday.    Patient was last seen 12.13.18 for post op residual right sided neck and right shoulder pain.    Patient had surgery 9.25.18 posterior cervical decompression and fusion at C3/4 with lateral mass screws and rods    Patient is currently having intermittent moderate to severe neck and bilateral arm pain, he denies weakness, but states that he has intermittent N/T bilateral shoulders.  He still has N/T right hand and fingers.  He is also having problems with his balance and gait.    He is also having N/T and pain bilateral legs, no leg pain.  He states that when he yawns in the morning his legs start twitching.    He is taking Gabapentin 600mg BID, Norco 7.5/325 2-3 x/day, Lidoderm Patch prn as prescribed by pain management    Neck Pain    The problem occurs intermittently. The problem has been unchanged. The pain is at a severity of 5/10. The pain is moderate. Associated symptoms include leg pain, numbness and weakness.   Arm Pain    The pain is present in the upper right arm, upper left arm, right forearm and left forearm. Associated symptoms include numbness.   Difficulty Walking   The problem occurs intermittently. Associated symptoms include arthralgias, neck pain, numbness and weakness.   Leg Pain    Associated symptoms include numbness.       The following portions of the patient's history were reviewed and updated as appropriate: allergies, current medications, past family history, past medical history, past social history, past surgical history and problem list.    Review of Systems   Musculoskeletal: Positive for arthralgias, gait problem, neck pain and neck stiffness.   Neurological: Positive for weakness and numbness.   All other systems  reviewed and are negative.    He is about 6 months out from a posterior cervical decompression and fusion with lateral mass screws.  He is now working with the pain doctors to give him his pain medications and his Tizanidine.  Lately he has been waking up with some spasms in his legs.  He is still hyperreflexic as expected.  I think he is probably showing some signs of spasticity even after his surgery.  We will switch him over to baclofen and have him stop the Tizanidine.  I will give him the prescription now but it is probably best for the pain doctors to continue it when it expires.  I reassured him he is making progress.  He is back at work and managing.  He walks regularly and has not been following.  His  strength is back to normal.  He has some numbness and tingling in his hands and his arms which is not unusual and I told him while that might improve there may be some permanent numbness and tingling that he will have to live with  Objective   Physical Exam   Constitutional: He is oriented to person, place, and time. He appears well-developed and well-nourished.   HENT:   Head: Normocephalic and atraumatic.   Eyes: Conjunctivae and EOM are normal. Pupils are equal, round, and reactive to light.   Fundoscopic exam:       The right eye shows no papilledema. The right eye shows venous pulsations.        The left eye shows no papilledema. The left eye shows venous pulsations.   Neck: Carotid bruit is not present.   Neurological: He is oriented to person, place, and time. He has a normal Finger-Nose-Finger Test and a normal Heel to Shin Test. Gait normal.   Reflex Scores:       Tricep reflexes are 2+ on the right side and 2+ on the left side.       Bicep reflexes are 2+ on the right side and 2+ on the left side.       Brachioradialis reflexes are 2+ on the right side and 2+ on the left side.       Patellar reflexes are 2+ on the right side and 2+ on the left side.       Achilles reflexes are 2+ on the right  side and 2+ on the left side.  Psychiatric: His speech is normal.     Neurologic Exam     Mental Status   Oriented to person, place, and time.   Registration of memory: Good recent and remote memory.   Attention: normal. Concentration: normal.   Speech: speech is normal   Level of consciousness: alert  Knowledge: consistent with education.     Cranial Nerves     CN II   Visual fields full to confrontation.   Visual acuity: normal    CN III, IV, VI   Pupils are equal, round, and reactive to light.  Extraocular motions are normal.     CN V   Facial sensation intact.   Right corneal reflex: normal  Left corneal reflex: normal    CN VII   Facial expression full, symmetric.   Right facial weakness: none  Left facial weakness: none    CN VIII   Hearing: intact    CN IX, X   Palate: symmetric    CN XI   Right sternocleidomastoid strength: normal  Left sternocleidomastoid strength: normal    CN XII   Tongue: not atrophic  Tongue deviation: none    Motor Exam   Muscle bulk: normal  Right arm tone: normal  Left arm tone: normal  Right leg tone: normal  Left leg tone: normal    Strength   Strength 5/5 except as noted.     Sensory Exam   Light touch normal.     Gait, Coordination, and Reflexes     Gait  Gait: normal    Coordination   Finger to nose coordination: normal  Heel to shin coordination: normal    Reflexes   Right brachioradialis: 2+  Left brachioradialis: 2+  Right biceps: 2+  Left biceps: 2+  Right triceps: 2+  Left triceps: 2+  Right patellar: 2+  Left patellar: 2+  Right achilles: 2+  Left achilles: 2+  Right : 2+  Left : 2+      Assessment/Plan   Independent Review of Radiographic Studies:    His x-rays done 3/13/19 show good position of the lateral mass screws and rods bilaterally at C3-C4 and the anterior hardware.  Agree with the report.      Medical Decision Making:    I think he is making nice progress but there is still room for improvement.  I will give him some baclofen for spasticity and he will  stop his Tizanidine.  I will give him the prescription now but it might be best if the pain doctors continue with when it expires.  I will see him in 6 months, a year from his last surgery.      Dave was seen today for neck pain, numbness, difficulty walking, arm pain and leg pain.    Diagnoses and all orders for this visit:    Cervical spondylosis with myelopathy    Chronic neck pain    Spasticity    Other orders  -     baclofen (LIORESAL) 10 MG tablet; Take 1 tablet by mouth 3 (Three) Times a Day. Take 1 tablet po qhs x 1week, then bid x 1 week, then tid      Return in about 6 months (around 9/13/2019).

## 2019-03-13 ENCOUNTER — OFFICE VISIT (OUTPATIENT)
Dept: NEUROSURGERY | Facility: CLINIC | Age: 49
End: 2019-03-13

## 2019-03-13 ENCOUNTER — HOSPITAL ENCOUNTER (OUTPATIENT)
Dept: GENERAL RADIOLOGY | Facility: HOSPITAL | Age: 49
Discharge: HOME | End: 2019-03-13
Admitting: PHYSICIAN ASSISTANT

## 2019-03-13 VITALS
SYSTOLIC BLOOD PRESSURE: 117 MMHG | HEART RATE: 64 BPM | HEIGHT: 69 IN | BODY MASS INDEX: 27.25 KG/M2 | WEIGHT: 184 LBS | DIASTOLIC BLOOD PRESSURE: 70 MMHG

## 2019-03-13 DIAGNOSIS — Z09 SURGERY FOLLOW-UP EXAMINATION: ICD-10-CM

## 2019-03-13 DIAGNOSIS — M54.2 CHRONIC NECK PAIN: ICD-10-CM

## 2019-03-13 DIAGNOSIS — M47.12 CERVICAL SPONDYLOSIS WITH MYELOPATHY: Primary | ICD-10-CM

## 2019-03-13 DIAGNOSIS — G89.29 CHRONIC NECK PAIN: ICD-10-CM

## 2019-03-13 DIAGNOSIS — R25.2 SPASTICITY: ICD-10-CM

## 2019-03-13 PROCEDURE — 72040 X-RAY EXAM NECK SPINE 2-3 VW: CPT

## 2019-03-13 PROCEDURE — 99213 OFFICE O/P EST LOW 20 MIN: CPT | Performed by: NEUROLOGICAL SURGERY

## 2019-03-13 RX ORDER — BACLOFEN 10 MG/1
10 TABLET ORAL 3 TIMES DAILY
Qty: 90 TABLET | Refills: 3 | Status: SHIPPED | OUTPATIENT
Start: 2019-03-13 | End: 2019-06-14 | Stop reason: SDUPTHER

## 2019-03-18 ENCOUNTER — OFFICE VISIT (OUTPATIENT)
Dept: PAIN MEDICINE | Facility: CLINIC | Age: 49
End: 2019-03-18

## 2019-03-18 VITALS
WEIGHT: 181.4 LBS | BODY MASS INDEX: 26.87 KG/M2 | RESPIRATION RATE: 14 BRPM | DIASTOLIC BLOOD PRESSURE: 76 MMHG | TEMPERATURE: 98.3 F | HEART RATE: 85 BPM | OXYGEN SATURATION: 98 % | HEIGHT: 69 IN | SYSTOLIC BLOOD PRESSURE: 115 MMHG

## 2019-03-18 DIAGNOSIS — G89.29 OTHER CHRONIC PAIN: Primary | ICD-10-CM

## 2019-03-18 DIAGNOSIS — M50.320 DEGENERATION OF INTERVERTEBRAL DISC OF MID-CERVICAL REGION, UNSPECIFIED SPINAL LEVEL: ICD-10-CM

## 2019-03-18 DIAGNOSIS — Z79.891 ENCOUNTER FOR MONITORING OPIOID MAINTENANCE THERAPY: ICD-10-CM

## 2019-03-18 DIAGNOSIS — Z51.81 ENCOUNTER FOR MONITORING OPIOID MAINTENANCE THERAPY: ICD-10-CM

## 2019-03-18 DIAGNOSIS — M96.1 POSTLAMINECTOMY SYNDROME, CERVICAL REGION: ICD-10-CM

## 2019-03-18 PROCEDURE — 99213 OFFICE O/P EST LOW 20 MIN: CPT | Performed by: NURSE PRACTITIONER

## 2019-03-18 RX ORDER — HYDROCODONE BITARTRATE AND ACETAMINOPHEN 7.5; 325 MG/1; MG/1
1 TABLET ORAL EVERY 8 HOURS PRN
Qty: 90 TABLET | Refills: 0 | Status: SHIPPED | OUTPATIENT
Start: 2019-03-18 | End: 2019-04-15

## 2019-03-18 NOTE — PROGRESS NOTES
CHIEF COMPLAINT  F/U neck pain. Patient states that his pain has remained the same since his last visit. The pain is worst at night.     Subjective   Dave Flynn is a 49 y.o. male  who presents to the office for follow-up.He has a history of chronic neck pain. Reports this is unchanged since last office visit.    Patient had surgery 9.25.18 posterior cervical decompression and fusion at C3/4 with lateral mass screws and rods. Since last office visit, he was seen by Dr. Martinez on 3-13-19. DIscontinued Tizanidine. Started Baclofen 10mg TID.     Complains of pain in his neck. Today his pain is 3/10VAS. Describes his pain as intermittent aching and throbbing with intermittent numbness and tingling. Continues with Hydrocodone 7.5/325 2-3/day, Gabapentin 600 mg BID. Discontinued Tizanidine by Dr. Martinez. Was changed to Baclofen 10 mg 3/day. Denies any side effects from the regimen. The regimen helps decrease his pain by 50%. ADL's by self. Is working full-time. Still having some myelopathic symptoms but admits that it is improving.      Neck Pain    This is a chronic problem. The current episode started more than 1 year ago. The problem has been gradually worsening (improving since surgery; unchanged since last office visit). The quality of the pain is described as aching, shooting and stabbing. The pain is at a severity of 3/10. The pain is moderate. The symptoms are aggravated by position, stress and twisting. Associated symptoms include numbness (L hand only) and weakness (hands bilat.occasionally). Pertinent negatives include no chest pain, fever, headaches (back of the head) or photophobia. He has tried oral narcotics (gabapentin; surgery) for the symptoms.      PEG Assessment   What number best describes your pain on average in the past week?3  What number best describes how, during the past week, pain has interfered with your enjoyment of life?3  What number best describes how, during the past week, pain  "has interfered with your general activity?  3    The following portions of the patient's history were reviewed and updated as appropriate: allergies, current medications, past family history, past medical history, past social history, past surgical history and problem list.    Review of Systems   Constitutional: Negative for activity change (restricted), appetite change and fever.   HENT: Negative for ear pain, hearing loss and mouth sores.    Eyes: Negative for photophobia and pain.   Respiratory: Negative for apnea, cough, choking, chest tightness and shortness of breath.    Cardiovascular: Negative for chest pain.   Gastrointestinal: Negative for abdominal pain, constipation, diarrhea and nausea.   Genitourinary: Negative for difficulty urinating, dysuria, flank pain and hematuria.   Musculoskeletal: Positive for neck pain and neck stiffness. Negative for back pain.   Neurological: Positive for weakness (hands bilat.occasionally) and numbness (L hand only). Negative for dizziness (pt states every morning), seizures, light-headedness and headaches (back of the head).   Psychiatric/Behavioral: Positive for sleep disturbance. Negative for agitation, confusion, hallucinations, self-injury and suicidal ideas.       Vitals:    03/18/19 1530   BP: 115/76   Pulse: 85   Resp: 14   Temp: 98.3 °F (36.8 °C)   SpO2: 98%   Weight: 82.3 kg (181 lb 6.4 oz)   Height: 175.3 cm (69\")   PainSc:   3   PainLoc: Neck     Objective   Physical Exam   Constitutional: He is oriented to person, place, and time. Vital signs are normal. He appears well-developed and well-nourished. He is cooperative.   HENT:   Head: Normocephalic and atraumatic.   Nose: Nose normal.   Eyes: Conjunctivae and lids are normal.   Neck: Muscular tenderness present. No spinous process tenderness present. Decreased range of motion present.   Posterior surgical scar   Cardiovascular: Normal rate.   Pulmonary/Chest: Effort normal.   Abdominal: Normal appearance. "   Neurological: He is alert and oriented to person, place, and time. Gait abnormal.   Skin: Skin is warm, dry and intact.   Psychiatric: He has a normal mood and affect. His speech is normal and behavior is normal. Judgment and thought content normal. Cognition and memory are normal.   Nursing note and vitals reviewed.      Assessment/Plan   Dave was seen today for neck pain.    Diagnoses and all orders for this visit:    Other chronic pain    Degeneration of intervertebral disc of mid-cervical region, unspecified spinal level    Postlaminectomy syndrome, cervical region    Encounter for monitoring opioid maintenance therapy    Other orders  -     HYDROcodone-acetaminophen (NORCO) 7.5-325 MG per tablet; Take 1 tablet by mouth Every 8 (Eight) Hours As Needed for Moderate Pain .      --- The urine drug screen confirmation from 12-18-18 has been reviewed and the result is APPROPRIATE based on patient history and [facility] report  --- Refill Hydrocodone. Patient appears stable with current regimen. No adverse effects. Regarding continuation of opioids, there is no evidence of aberrant behavior or any red flags.  The patient continues with appropriate response to opioid therapy. ADL's remain intact by self.   --- Follow-up 2 months or sooner if needed.     [facility] REPORT    As part of the patient's treatment plan, I am prescribing controlled substances. The patient has been made aware of appropriate use of such medications, including potential risk of somnolence, limited ability to drive and/or work safely, and the potential for dependence or overdose. It has also bee made clear that these medications are for use by this patient only, without concomitant use of alcohol or other substances unless prescribed.     Patient has completed prescribing agreement detailing terms of continued prescribing of controlled substances, including monitoring [facility] reports, urine drug screening, and pill counts if necessary. The patient is  aware that inappropriate use will results in cessation of prescribing such medications.    [facility] report has been reviewed and scanned into the patient's chart.    As the clinician, I personally reviewed the SINAN from 3-15-19 while the patient was in the office today.    History and physical exam exhibit continued safe and appropriate use of controlled substances.      EMR Dragon/Transcription disclaimer:   Much of this encounter note is an electronic transcription/translation of spoken language to printed text. The electronic translation of spoken language may permit erroneous, or at times, nonsensical words or phrases to be inadvertently transcribed; Although I have reviewed the note for such errors, some may still exist.

## 2019-04-15 RX ORDER — HYDROCODONE BITARTRATE AND ACETAMINOPHEN 7.5; 325 MG/1; MG/1
1 TABLET ORAL EVERY 8 HOURS PRN
Qty: 90 TABLET | Refills: 0 | Status: SHIPPED | OUTPATIENT
Start: 2019-04-15 | End: 2019-05-16 | Stop reason: SDUPTHER

## 2019-04-15 NOTE — TELEPHONE ENCOUNTER
Medication Refill Request    Date of phone call: 4/15/19    Medication being requested: norco 7.5/325 mg si tab po q 8 hours prn  Qty: 90    Date of last visit: 3/18/19    Date of last refill: 3/18/19    SINAN up to date?: yes    Next Follow up?: 19    Any new pertinent information? (i.e, new medication allergies, new use of medications, change in patient's health or condition, non-compliance or inconsistency with prescribing agreement?):

## 2019-04-22 NOTE — PATIENT INSTRUCTIONS
--------  Education about Radiofrequency Lesioning of Medial Branches:    The medial branch blockade was intended for diagnostic purposes, with the intent of offering the patient Radiofrequency thermal rhizotomy if the MBB is diagnostically effective.  The diagnostic blockade is necessary to determine the likelihood that RF therapy could be efficacious in providing long term relief to the patient.  As indicated above, diagnostic efficacy was established.      In the RF procedure, needles are placed to the joint lines in the same fashion, and after testing, the needle tips are heated to thermally treat the nerves, blocking the nerves by in essence damaging the nerves with the heat treatment.      Medically, a successful RF procedure should provide a patient with 50% pain relief or more for at least 6 months.  We estimate a likelihood of about an 80% chance that medical success will be realized.  We discussed & educated once again that not all patients have a medically successful result, and the patient voices understanding.  However, our clinical experience suggests that successful patients receive relief more in the range of 12 months on average.  (We also discussed that a fortunate minority of patients receive therapeutic success from the MBB, and may not require RF ablation.  If a patient receives more than 8 weeks of relief from MBB, then occasional repeat MBB for therapeutic purposes is a very reasonable alternative therapy.  This course of therapy is consistent with our LCDs according to our CMS  in the area, and therefore other insurance providers should follow accordingly.  We will monitor our patients to screen for these therapeutic responders and will offer RF therapy only when necessary.  However, in this clinical scenario, this therapeutic result was not realized, and therefore Radiofrequency Lesioning is medically necessary.)      We discussed that MBB & RF are not without risks.  Guidelines  Rec: Epi-Lasek OD with [id number] Goal: Andra SMALLWOOD. regarding anticoagulant use & neuraxial procedures will be respected.  Patients that are ill or otherwise may be at risk for sepsis will not have their spines accessed by neuraxial injections of any type.  This patient will not be offered these therapies if there is an increased risk.   We discussed that there is a risk of postprocedural pain and also a risk of worsening of clinical picture with these procedures as with any neuraxial procedure.  All invasive procedures have risks including but not limited to bleeding, infection, injury, nerve injury, paralysis, coma, death, lack of pain relief, and worsening of clinical picture.      In this education session, all of these topics were covered and the patient voiced understanding.    ---------

## 2019-05-16 ENCOUNTER — RESULTS ENCOUNTER (OUTPATIENT)
Dept: PAIN MEDICINE | Facility: CLINIC | Age: 49
End: 2019-05-16

## 2019-05-16 ENCOUNTER — OFFICE VISIT (OUTPATIENT)
Dept: PAIN MEDICINE | Facility: CLINIC | Age: 49
End: 2019-05-16

## 2019-05-16 VITALS
SYSTOLIC BLOOD PRESSURE: 125 MMHG | RESPIRATION RATE: 16 BRPM | HEART RATE: 92 BPM | HEIGHT: 69 IN | TEMPERATURE: 98.1 F | BODY MASS INDEX: 26.54 KG/M2 | WEIGHT: 179.2 LBS | OXYGEN SATURATION: 97 % | DIASTOLIC BLOOD PRESSURE: 84 MMHG

## 2019-05-16 DIAGNOSIS — M50.320 DEGENERATION OF INTERVERTEBRAL DISC OF MID-CERVICAL REGION, UNSPECIFIED SPINAL LEVEL: ICD-10-CM

## 2019-05-16 DIAGNOSIS — Z79.891 ENCOUNTER FOR MONITORING OPIOID MAINTENANCE THERAPY: ICD-10-CM

## 2019-05-16 DIAGNOSIS — M96.1 POSTLAMINECTOMY SYNDROME, CERVICAL REGION: ICD-10-CM

## 2019-05-16 DIAGNOSIS — G89.29 OTHER CHRONIC PAIN: ICD-10-CM

## 2019-05-16 DIAGNOSIS — Z51.81 ENCOUNTER FOR MONITORING OPIOID MAINTENANCE THERAPY: ICD-10-CM

## 2019-05-16 DIAGNOSIS — G89.29 OTHER CHRONIC PAIN: Primary | ICD-10-CM

## 2019-05-16 LAB
POC AMPHETAMINES: NEGATIVE
POC BARBITURATES: NEGATIVE
POC BENZODIAZEPHINES: NEGATIVE
POC COCAINE: NEGATIVE
POC METHADONE: NEGATIVE
POC METHAMPHETAMINE SCREEN URINE: NEGATIVE
POC OPIATES: NEGATIVE
POC OXYCODONE: POSITIVE
POC PHENCYCLIDINE: NEGATIVE
POC PROPOXYPHENE: NEGATIVE
POC THC: NEGATIVE
POC TRICYCLIC ANTIDEPRESSANTS: NEGATIVE

## 2019-05-16 PROCEDURE — 99213 OFFICE O/P EST LOW 20 MIN: CPT | Performed by: NURSE PRACTITIONER

## 2019-05-16 PROCEDURE — 80305 DRUG TEST PRSMV DIR OPT OBS: CPT | Performed by: NURSE PRACTITIONER

## 2019-05-16 RX ORDER — LEVOCETIRIZINE DIHYDROCHLORIDE 5 MG/1
5 TABLET, FILM COATED ORAL DAILY
Refills: 4 | COMMUNITY
Start: 2019-05-01

## 2019-05-16 RX ORDER — HYDROCODONE BITARTRATE AND ACETAMINOPHEN 7.5; 325 MG/1; MG/1
1 TABLET ORAL EVERY 8 HOURS PRN
Qty: 90 TABLET | Refills: 0 | Status: SHIPPED | OUTPATIENT
Start: 2019-05-16 | End: 2019-06-14 | Stop reason: SDUPTHER

## 2019-05-16 RX ORDER — ALPRAZOLAM 0.25 MG/1
0.25 TABLET ORAL DAILY
Refills: 0 | COMMUNITY
Start: 2019-04-05 | End: 2019-09-13

## 2019-05-16 NOTE — PROGRESS NOTES
CHIEF COMPLAINT  F/U neck pain- patient states that his pain has remained the same. It worsens depending on the day and his activity.     Subjective   Dave Flynn is a 49 y.o. male  who presents to the office for follow-up.He has a history of chronic neck pain. Reports his pain is unchanged since last office visit.     Patient had surgery 9.25.18 posterior cervical decompression and fusion at C3/4 with lateral mass screws and rods. Since last office visit, he was seen by Dr. Martinez on 3-13-19. Discontinued Tizanidine. Started Baclofen 10mg TID.    He is wondering if he can see Dr Martinez sooner. He is having increased pain in his neck, but is wondering if his anxiety is worse about this. He also states he is also walking different. Feels very unsteady and as if he is walking sideways.     Complains of pain in his neck. Today his pain is 4/1)VAS. Describes the pain as continuous throbbing with intermittent stabbing. The pain also radiates into his arms. Continues with Hydrocodone 7.5/325 2-3/day, Gabapentin 600 mg BID. Discontinued Tizanidine by Dr. Martinez. Was changed to Baclofen 10 mg 3/day. Denies any side effects from the regimen. The regimen helps decrease his pain by 40-50%. ADL's by self. Is working full-time. Still having some myelopathic symptoms but admits that it is improving.  He especially notices these symptoms upon awakening. Is also noticing shaking in his legs, right worse than left, when he is sitting down and even when he is walking. His wife has even noticed the jerking movements in his sleep. He feels like this is getting worse.    Discussed potential for ABRAM versus CMBB. However, his out of pocket is $1000 when he has procedures performed.     Neck Pain    This is a chronic problem. The current episode started more than 1 year ago. The problem has been gradually worsening (improving since surgery; unchanged since last office visit). The quality of the pain is described as aching,  shooting and stabbing. The pain is at a severity of 4/10. The pain is moderate. The symptoms are aggravated by position, stress and twisting. Associated symptoms include numbness (L hand only) and weakness (hands bilat.occasionally). Pertinent negatives include no chest pain, fever, headaches (back of the head) or photophobia. He has tried oral narcotics (gabapentin; surgery) for the symptoms.      PEG Assessment   What number best describes your pain on average in the past week?4  What number best describes how, during the past week, pain has interfered with your enjoyment of life?4  What number best describes how, during the past week, pain has interfered with your general activity?  4    The following portions of the patient's history were reviewed and updated as appropriate: allergies, current medications, past family history, past medical history, past social history, past surgical history and problem list.    Review of Systems   Constitutional: Positive for fatigue. Negative for activity change (restricted), appetite change and fever.   HENT: Negative for ear pain, hearing loss and mouth sores.    Eyes: Negative for photophobia and pain.   Respiratory: Negative for apnea, cough, choking, chest tightness and shortness of breath.    Cardiovascular: Negative for chest pain.   Gastrointestinal: Negative for abdominal pain, constipation, diarrhea and nausea.   Genitourinary: Negative for difficulty urinating, dysuria, flank pain and hematuria.   Musculoskeletal: Positive for gait problem, neck pain and neck stiffness. Negative for back pain.   Neurological: Positive for weakness (hands bilat.occasionally) and numbness (L hand only). Negative for dizziness (pt states every morning), seizures, light-headedness and headaches (back of the head).   Psychiatric/Behavioral: Positive for agitation and sleep disturbance. Negative for confusion, hallucinations, self-injury and suicidal ideas. The patient is nervous/anxious.   "      Vitals:    05/16/19 1537   BP: 125/84   Pulse: 92   Resp: 16   Temp: 98.1 °F (36.7 °C)   SpO2: 97%   Weight: 81.3 kg (179 lb 3.2 oz)   Height: 175.3 cm (69\")   PainSc:   4   PainLoc: Neck     Objective   Physical Exam   Constitutional: He is oriented to person, place, and time. Vital signs are normal. He appears well-developed and well-nourished. He is cooperative.   HENT:   Head: Normocephalic and atraumatic.   Nose: Nose normal.   Eyes: Conjunctivae and lids are normal.   Neck: Muscular tenderness present. No spinous process tenderness present. Decreased range of motion present.   Posterior surgical scar   Cardiovascular: Normal rate.   Pulmonary/Chest: Effort normal.   Abdominal: Normal appearance.   Neurological: He is alert and oriented to person, place, and time. Gait abnormal.   Reflex Scores:       Bicep reflexes are 3+ on the right side and 3+ on the left side.       Brachioradialis reflexes are 3+ on the right side and 3+ on the left side.       Patellar reflexes are 3+ on the right side and 3+ on the left side.  Skin: Skin is warm, dry and intact.   Psychiatric: He has a normal mood and affect. His speech is normal and behavior is normal. Judgment and thought content normal. Cognition and memory are normal.   Nursing note and vitals reviewed.      Assessment/Plan   Dave was seen today for neck pain.    Diagnoses and all orders for this visit:    Other chronic pain    Degeneration of intervertebral disc of mid-cervical region, unspecified spinal level    Postlaminectomy syndrome, cervical region    Encounter for monitoring opioid maintenance therapy    Other orders  -     HYDROcodone-acetaminophen (NORCO) 7.5-325 MG per tablet; Take 1 tablet by mouth Every 8 (Eight) Hours As Needed for Moderate Pain .      --- Routine UDS in office today as part of monitoring requirements for controlled substances.  The specimen was viewed and the immunoassay result reviewed and is +OXY(anticipate false positive due " to cross sensitivity).  This specimen will be sent to [facility] laboratory for confirmation.        --- Refill Hydrocodone. Patient appears stable with current regimen. No adverse effects. Regarding continuation of opioids, there is no evidence of aberrant behavior or any red flags.  The patient continues with appropriate response to opioid therapy. ADL's remain intact by self.   --- Follow-up 2 months or sooner if needed WITH DR STEWART MENON REPORT    As part of the patient's treatment plan, I am prescribing controlled substances. The patient has been made aware of appropriate use of such medications, including potential risk of somnolence, limited ability to drive and/or work safely, and the potential for dependence or overdose. It has also bee made clear that these medications are for use by this patient only, without concomitant use of alcohol or other substances unless prescribed.     Patient has completed prescribing agreement detailing terms of continued prescribing of controlled substances, including monitoring SINAN reports, urine drug screening, and pill counts if necessary. The patient is aware that inappropriate use will results in cessation of prescribing such medications.    SINAN report has been reviewed and scanned into the patient's chart.    As the clinician, I personally reviewed the SINAN from 5-14-19 while the patient was in the office today.    History and physical exam exhibit continued safe and appropriate use of controlled substances.      EMR Dragon/Transcription disclaimer:   Much of this encounter note is an electronic transcription/translation of spoken language to printed text. The electronic translation of spoken language may permit erroneous, or at times, nonsensical words or phrases to be inadvertently transcribed; Although I have reviewed the note for such errors, some may still exist.

## 2019-05-22 ENCOUNTER — OFFICE VISIT (OUTPATIENT)
Dept: NEUROSURGERY | Facility: CLINIC | Age: 49
End: 2019-05-22

## 2019-05-22 VITALS
SYSTOLIC BLOOD PRESSURE: 117 MMHG | HEIGHT: 69 IN | HEART RATE: 92 BPM | DIASTOLIC BLOOD PRESSURE: 77 MMHG | BODY MASS INDEX: 26.51 KG/M2 | WEIGHT: 179 LBS

## 2019-05-22 DIAGNOSIS — R25.2 SPASTICITY: Primary | ICD-10-CM

## 2019-05-22 DIAGNOSIS — M50.320 DEGENERATION OF INTERVERTEBRAL DISC OF MID-CERVICAL REGION, UNSPECIFIED SPINAL LEVEL: ICD-10-CM

## 2019-05-22 PROCEDURE — 99214 OFFICE O/P EST MOD 30 MIN: CPT | Performed by: PHYSICIAN ASSISTANT

## 2019-05-22 NOTE — PROGRESS NOTES
"Subjective   Patient ID: Dave Flynn is a 49 y.o. male is here today for follow-up.  He is 7 months out from a posterior cervical decompression and fusion at C3/4 with lateral mass screws and rods by Dr. Martinez 9/25/18.  He returns to the office with increased bilateral leg pain trouble walking.  He states Baclofen does not help with leg \"jerking\"  when sitting for a long period of time. He takes Hydrocodone  7.5/325 BID and Gabapentin 600 mg BID and Baclofen 10 mg BID for pain.      Leg Pain    The incident occurred more than 1 week ago. There was no injury mechanism. The pain is present in the right thigh and left thigh. The quality of the pain is described as cramping. The pain is at a severity of 5/10. The pain has been intermittent since onset. Associated symptoms include muscle weakness and tingling. Pertinent negatives include no numbness. Treatments tried: Baclofen    Neck Pain    This is a chronic problem. The current episode started more than 1 year ago. The problem occurs constantly. The problem has been unchanged. The pain is at a severity of 5/10. The pain is moderate. Associated symptoms include leg pain, tingling and weakness. Pertinent negatives include no numbness.   Difficulty Walking   This is a new problem. The current episode started more than 1 month ago. The problem occurs constantly. The problem has been unchanged. Associated symptoms include neck pain and weakness. Pertinent negatives include no numbness.       The following portions of the patient's history were reviewed and updated as appropriate: allergies, current medications, past family history, past medical history, past social history, past surgical history and problem list.    Review of Systems   Musculoskeletal: Positive for gait problem, neck pain and neck stiffness.        Leg pain    Neurological: Positive for tingling and weakness. Negative for numbness.   All other systems reviewed and are negative.      Objective "   Physical Exam   Constitutional: He is oriented to person, place, and time. He appears well-developed and well-nourished.   HENT:   Head: Normocephalic and atraumatic.   Right Ear: External ear normal.   Left Ear: External ear normal.   Eyes: Conjunctivae and EOM are normal. Pupils are equal, round, and reactive to light. Right eye exhibits no discharge. Left eye exhibits no discharge.   Neck: Normal range of motion. Neck supple. No tracheal deviation present.   Cardiovascular: Intact distal pulses.   Pulmonary/Chest: Effort normal. No stridor. No respiratory distress.   Musculoskeletal: Normal range of motion. He exhibits no edema, tenderness or deformity.   Neurological: He is alert and oriented to person, place, and time. He has normal strength. He displays no atrophy, no tremor and normal reflexes. No cranial nerve deficit or sensory deficit. He exhibits normal muscle tone. He displays a negative Romberg sign. He displays no seizure activity. Coordination and gait normal.   Reflex Scores:       Tricep reflexes are 3+ on the right side and 3+ on the left side.       Bicep reflexes are 3+ on the right side and 3+ on the left side.       Brachioradialis reflexes are 3+ on the right side and 3+ on the left side.       Patellar reflexes are 3+ on the right side and 3+ on the left side.       Achilles reflexes are 3+ on the right side and 3+ on the left side.  Skin: Skin is warm and dry.   Psychiatric: He has a normal mood and affect. His behavior is normal. Judgment and thought content normal.   Nursing note and vitals reviewed.    Neurologic Exam     Mental Status   Oriented to person, place, and time.     Cranial Nerves     CN III, IV, VI   Pupils are equal, round, and reactive to light.  Extraocular motions are normal.     Motor Exam     Strength   Strength 5/5 throughout.     Gait, Coordination, and Reflexes     Reflexes   Right brachioradialis: 3+  Left brachioradialis: 3+  Right biceps: 3+  Left biceps:  3+  Right triceps: 3+  Left triceps: 3+  Right patellar: 3+  Left patellar: 3+  Right achilles: 3+  Left achilles: 3+      Assessment/Plan   Independent Review of Radiographic Studies:      Medical Decision Making:    Mr. Flynn has a history of a previous ACDF at C4-C5 C5-C6 C6-C7 in February 2016 as well as posterior cervical decompression and fusion at C3-C4 in September 2018.  He has noticed significant improvement in his radicular symptoms and arm strength and neck pain since his most recent surgery but does continue to have chronic neck pain for which he is followed by Dr. Barrientos.  He came back today because of increasing gait difficulties as well as pain in his anterior thighs bilaterally, a subjective sense of heaviness in his legs greater than arms.  The pain is described as a burning type pain in the anterior thighs, right worse than left.  It is fairly constant but worsens with any prolonged standing or walking.  The sense of weakness is more significant in the legs but he does feel that the arm weakness may be slightly worse than it was a few months ago.  He has some numbness and tingling in his arms which is fairly chronic as well as the anterior thighs which is new.  He also reports increasing spasticity in his legs.  He denies any bowel or bladder incontinence.  His exam does not reveal any focal weakness but he is very hyperreflexic.    I explained to him that his hyperreflexia as well as spasticity and numbness and tingling may certainly be from his previous cord compression.  However, given the fact that he feels that some of the symptoms are progressing and is now having new anterior thigh pain and numbness and tingling in gait issues I do think new imaging is required.  I will order a cervical thoracic and lumbar MRI.  I will also order cervical x-rays.  He will follow-up thereafter with Dr. Martinez.    Dave was seen today for difficulty walking, neck pain and leg pain.    Diagnoses and all  orders for this visit:    Spasticity  -     MRI Cervical Spine With & Without Contrast; Future  -     MRI Thoracic Spine Without Contrast; Future  -     MRI Lumbar Spine Without Contrast; Future  -     XR Cervical Spine AP & Lat with Flex and Ext. views; Future    Degeneration of intervertebral disc of mid-cervical region, unspecified spinal level  -     MRI Cervical Spine With & Without Contrast; Future  -     MRI Thoracic Spine Without Contrast; Future  -     MRI Lumbar Spine Without Contrast; Future  -     XR Cervical Spine AP & Lat with Flex and Ext. views; Future      Return for follow up after radiology test with Dr. Martinez (next 2wks).

## 2019-06-11 ENCOUNTER — HOSPITAL ENCOUNTER (OUTPATIENT)
Dept: GENERAL RADIOLOGY | Facility: HOSPITAL | Age: 49
Discharge: HOME | End: 2019-06-11

## 2019-06-11 ENCOUNTER — HOSPITAL ENCOUNTER (OUTPATIENT)
Dept: MRI IMAGING | Facility: HOSPITAL | Age: 49
Discharge: HOME | End: 2019-06-11
Admitting: PHYSICIAN ASSISTANT

## 2019-06-11 ENCOUNTER — HOSPITAL ENCOUNTER (OUTPATIENT)
Dept: MRI IMAGING | Facility: HOSPITAL | Age: 49
Discharge: HOME | End: 2019-06-11

## 2019-06-11 DIAGNOSIS — M50.320 DEGENERATION OF INTERVERTEBRAL DISC OF MID-CERVICAL REGION, UNSPECIFIED SPINAL LEVEL: ICD-10-CM

## 2019-06-11 DIAGNOSIS — R25.2 SPASTICITY: ICD-10-CM

## 2019-06-11 PROCEDURE — 72050 X-RAY EXAM NECK SPINE 4/5VWS: CPT

## 2019-06-11 PROCEDURE — 82565 ASSAY OF CREATININE: CPT

## 2019-06-11 PROCEDURE — 72146 MRI CHEST SPINE W/O DYE: CPT

## 2019-06-11 PROCEDURE — A9577 INJ MULTIHANCE: HCPCS | Performed by: PHYSICIAN ASSISTANT

## 2019-06-11 PROCEDURE — 72156 MRI NECK SPINE W/O & W/DYE: CPT

## 2019-06-11 PROCEDURE — 72148 MRI LUMBAR SPINE W/O DYE: CPT

## 2019-06-11 PROCEDURE — 0 GADOBENATE DIMEGLUMINE 529 MG/ML SOLUTION: Performed by: PHYSICIAN ASSISTANT

## 2019-06-11 RX ADMIN — GADOBENATE DIMEGLUMINE 17 ML: 529 INJECTION, SOLUTION INTRAVENOUS at 15:46

## 2019-06-11 NOTE — PROGRESS NOTES
Subjective   Patient ID: Dave Flynn is a 49 y.o. male is here today for follow-up to discuss cervical, thoracic and lumbar spine MRI and cervical spine plain films.    Patient was last seen 5.22.19 for bilateral leg pain,weakness and difficulty with his balance and gait.    Patient states that his symptoms are unchanged, he is having constant moderate bilateral anterior leg pain, bilateral leg weakness, he denies low back pain.  He has constant neck and bilateral upper arm and shoulder pain.  He denies arm weakness, but does have problems with his balance and gait.     He is taking Baclofen TID, Gabapentin 600 mg TID and Hydrocodone 7.5/325 TID as prescribed by [facility] pain management    Mr. Flynn has a history of a previous ACDF at C4-C5 C5-C6 C6-C7 in February 2016 as well as posterior cervical decompression and fusion at C3-C4 in September 2018.     It has been some 20 months since his posterior cervical decompression and fusion. He was seen last month complaining of some increasing stiffness and difficulty using his arms and legs. His exam is noted below. He does have intermittent pain in his thighs and he has his usual chronic neck pain. I discussed the new MRIs. There is certainly nothing new going on in his neck. He has some lumbar stenosis that probably accounts for the bilateral leg pain, but I think most of the other symptoms re related to his spasticity. He has been on baclofen at 10 mg t.i.d. and he does not notice much of a difference. I think we need to increase that dose to 10 mg q.6 h. I explained to him the nature of spasticity and how it is an aftereffect of the long-term compression from his cervical myelopathy. Will enlist the help of Dr. Cuello to medically manage his spasticity for now he might even benefit later on from some Botox injections. Will have him come back in 4 months. He continues to work and is tolerating it. I reassured him that I did not think he needed any surgery right  now.      Difficulty Walking   The problem occurs constantly. The problem has been unchanged. Associated symptoms include arthralgias, neck pain and weakness. Pertinent negatives include no myalgias or numbness.   Neck Pain    The problem occurs constantly. The problem has been unchanged. The pain is present in the midline. The pain is at a severity of 7/10. The pain is moderate. Associated symptoms include leg pain and weakness. Pertinent negatives include no numbness or tingling.   Arm Pain    The pain is present in the right forearm, upper left arm, upper right arm and left forearm. The pain is at a severity of 6/10. The pain is moderate. Pertinent negatives include no numbness or tingling.   Leg Pain    The pain is present in the left leg and right leg. The pain is at a severity of 6/10. The pain is moderate. Pertinent negatives include no numbness or tingling.   Extremity Weakness    The pain is present in the left lower leg, right upper leg and right lower leg. The problem occurs constantly. The problem has been unchanged. Pertinent negatives include no numbness or tingling.       The following portions of the patient's history were reviewed and updated as appropriate: allergies, current medications, past family history, past medical history, past social history, past surgical history and problem list.    Review of Systems   Musculoskeletal: Positive for arthralgias, extremity weakness, gait problem, neck pain and neck stiffness. Negative for back pain and myalgias.   Neurological: Positive for weakness. Negative for tingling and numbness.   All other systems reviewed and are negative.      Objective   Physical Exam   Constitutional: He is oriented to person, place, and time. He appears well-developed and well-nourished.   HENT:   Head: Normocephalic and atraumatic.   Eyes: Conjunctivae and EOM are normal. Pupils are equal, round, and reactive to light.   Fundoscopic exam:       The right eye shows no  papilledema. The right eye shows venous pulsations.        The left eye shows no papilledema. The left eye shows venous pulsations.   Neck: Carotid bruit is not present.   Neurological: He is oriented to person, place, and time. He has a normal Finger-Nose-Finger Test and a normal Heel to Shin Test. Gait normal.   Reflex Scores:       Tricep reflexes are 4+ on the right side and 4+ on the left side.       Bicep reflexes are 4+ on the right side and 4+ on the left side.       Brachioradialis reflexes are 4+ on the right side and 4+ on the left side.       Patellar reflexes are 4+ on the right side and 4+ on the left side.       Achilles reflexes are 4+ on the right side and 4+ on the left side.  Psychiatric: His speech is normal.     Neurologic Exam     Mental Status   Oriented to person, place, and time.   Registration of memory: Good recent and remote memory.   Attention: normal. Concentration: normal.   Speech: speech is normal   Level of consciousness: alert  Knowledge: consistent with education.     Cranial Nerves     CN II   Visual fields full to confrontation.   Visual acuity: normal    CN III, IV, VI   Pupils are equal, round, and reactive to light.  Extraocular motions are normal.     CN V   Facial sensation intact.   Right corneal reflex: normal  Left corneal reflex: normal    CN VII   Facial expression full, symmetric.   Right facial weakness: none  Left facial weakness: none    CN VIII   Hearing: intact    CN IX, X   Palate: symmetric    CN XI   Right sternocleidomastoid strength: normal  Left sternocleidomastoid strength: normal    CN XII   Tongue: not atrophic  Tongue deviation: none    Motor Exam   Muscle bulk: normal  Right arm tone: normal  Left arm tone: normal  Right leg tone: normal  Left leg tone: normal    Strength   Strength 5/5 except as noted.     Sensory Exam   Light touch normal.     Gait, Coordination, and Reflexes     Gait  Gait: normal    Coordination   Finger to nose coordination:  normal  Heel to shin coordination: normal    Reflexes   Right brachioradialis: 4+  Left brachioradialis: 4+  Right biceps: 4+  Left biceps: 4+  Right triceps: 4+  Left triceps: 4+  Right patellar: 4+  Left patellar: 4+  Right achilles: 4+  Left achilles: 4+  Right : 4+  Left : 4+  Right Butt: present  Left Butt: present  Right ankle clonus: present  Left ankle clonus: present      Assessment/Plan   Independent Review of Radiographic Studies:    Reviewed the cervical thoracic and lumbar MRI done on 6/11/2019.  He is well decompressed at C3-C4 anteriorly and posteriorly.  The other operated levels C4-C7 look fine.  He has a very mild amount of adjacent level disease at C2-C3 and C7-T1 but nothing dramatic.  The thoracic MRI is unremarkable.  The lumbar MRI showed some mild lateral recess stenosis at L4-L5.  Agree with the report.      Medical Decision Making:    I think most of his problems are related to some progression in his spasticity.  We will increase his baclofen to 10 mg every 6 hours and have him see Dr. Cuello for spasticity management.  If he begins having more pain in his legs he should let me or Dr. Barrientos know and we can consider epidural blocks but I think if the spasticity that is the main issue right now.  We will see him again in 4 months.      There are no diagnoses linked to this encounter.  No Follow-up on file.

## 2019-06-12 LAB — CREAT BLDA-MCNC: 0.9 MG/DL (ref 0.6–1.3)

## 2019-06-14 ENCOUNTER — OFFICE VISIT (OUTPATIENT)
Dept: NEUROSURGERY | Facility: CLINIC | Age: 49
End: 2019-06-14

## 2019-06-14 VITALS
BODY MASS INDEX: 26.36 KG/M2 | HEIGHT: 69 IN | SYSTOLIC BLOOD PRESSURE: 120 MMHG | DIASTOLIC BLOOD PRESSURE: 78 MMHG | WEIGHT: 178 LBS

## 2019-06-14 DIAGNOSIS — M54.2 CHRONIC NECK PAIN: ICD-10-CM

## 2019-06-14 DIAGNOSIS — R25.2 SPASTICITY: Primary | ICD-10-CM

## 2019-06-14 DIAGNOSIS — G89.29 CHRONIC NECK PAIN: ICD-10-CM

## 2019-06-14 DIAGNOSIS — M48.062 SPINAL STENOSIS OF LUMBAR REGION WITH NEUROGENIC CLAUDICATION: ICD-10-CM

## 2019-06-14 PROCEDURE — 99214 OFFICE O/P EST MOD 30 MIN: CPT | Performed by: NEUROLOGICAL SURGERY

## 2019-06-14 RX ORDER — BACLOFEN 10 MG/1
10 TABLET ORAL EVERY 6 HOURS
Qty: 120 TABLET | Refills: 3 | Status: SHIPPED | OUTPATIENT
Start: 2019-06-14 | End: 2019-11-07 | Stop reason: SDUPTHER

## 2019-06-14 NOTE — TELEPHONE ENCOUNTER
Medication Refill Request    Date of phone call: 2019    Medication being requested:Norco 7.5-325 mg  si tablet every 8 hours PRN  Qty: 90    Date of last visit:2019     Date of last refill: 2019    SINAN up to date?: yes    Next Follow up?:7/15/2019     Any new pertinent information? (i.e, new medication allergies, new use of medications, change in patient's health or condition, non-compliance or inconsistency with prescribing agreement?):

## 2019-06-19 RX ORDER — HYDROCODONE BITARTRATE AND ACETAMINOPHEN 7.5; 325 MG/1; MG/1
1 TABLET ORAL EVERY 8 HOURS PRN
Qty: 90 TABLET | Refills: 0 | Status: SHIPPED | OUTPATIENT
Start: 2019-06-19 | End: 2019-07-15 | Stop reason: SDUPTHER

## 2019-06-19 NOTE — TELEPHONE ENCOUNTER
James for a pill count and UDS in 2 weeks. I reviewed his UDS. It was negative for Hydrocodone and positive for oxycodone. He had been prescribed oxycodone recently. So it's not entirely inappropriate. Will refill with plan to perform pill count and random UDS. Thanks. SUZAN

## 2019-07-12 ENCOUNTER — TELEPHONE (OUTPATIENT)
Dept: PAIN MEDICINE | Facility: CLINIC | Age: 49
End: 2019-07-12

## 2019-07-12 NOTE — TELEPHONE ENCOUNTER
Pt is r/s from 7/15 to 8/1 on [facility] calendar - stated that he would need a refill on meds which are listed below       HYDROcodone-acetaminophen (NORCO) 7.5-325 MG per tablet    Original Order:  HYDROcodone-acetaminophen (NORCO) 7.5-325 MG per tablet [[id number]]      Pharmacy:  [facility] AT [facility] - [phone]  - [phone] FX Phone:  [phone] Fax:  [phone]    Address:  [address], [place] [address]

## 2019-07-15 NOTE — TELEPHONE ENCOUNTER
Medication Refill Request    Date of phone call: 7/15/19    Medication being requested: norco 7.5/325mg si tab po q 8 hours prn   Qty: 90    Date of last visit: 19    Date of last refill: 19    SINAN up to date?: yes    Next Follow up?: 19    Any new pertinent information? (i.e, new medication allergies, new use of medications, change in patient's health or condition, non-compliance or inconsistency with prescribing agreement?):

## 2019-07-16 RX ORDER — HYDROCODONE BITARTRATE AND ACETAMINOPHEN 7.5; 325 MG/1; MG/1
1 TABLET ORAL EVERY 8 HOURS PRN
Qty: 45 TABLET | Refills: 0 | Status: SHIPPED | OUTPATIENT
Start: 2019-07-16 | End: 2019-08-01 | Stop reason: SDUPTHER

## 2019-07-16 NOTE — TELEPHONE ENCOUNTER
It was sent in a message a few weeks ago. Had somewhat abnormal UDS. + for Oxycodone, negative for Hydrocodone. James For UDS at next office visit. Will refill until office visit on 8-1-19. Thanks. BB

## 2019-08-01 ENCOUNTER — OFFICE VISIT (OUTPATIENT)
Dept: PAIN MEDICINE | Facility: CLINIC | Age: 49
End: 2019-08-01

## 2019-08-01 VITALS
SYSTOLIC BLOOD PRESSURE: 122 MMHG | DIASTOLIC BLOOD PRESSURE: 81 MMHG | WEIGHT: 179.8 LBS | TEMPERATURE: 98.3 F | BODY MASS INDEX: 26.63 KG/M2 | OXYGEN SATURATION: 96 % | RESPIRATION RATE: 18 BRPM | HEIGHT: 69 IN | HEART RATE: 95 BPM

## 2019-08-01 DIAGNOSIS — Z79.891 ENCOUNTER FOR MONITORING OPIOID MAINTENANCE THERAPY: ICD-10-CM

## 2019-08-01 DIAGNOSIS — G89.29 OTHER CHRONIC PAIN: Primary | ICD-10-CM

## 2019-08-01 DIAGNOSIS — M48.062 SPINAL STENOSIS OF LUMBAR REGION WITH NEUROGENIC CLAUDICATION: ICD-10-CM

## 2019-08-01 DIAGNOSIS — M96.1 POSTLAMINECTOMY SYNDROME, CERVICAL REGION: ICD-10-CM

## 2019-08-01 DIAGNOSIS — Z51.81 ENCOUNTER FOR MONITORING OPIOID MAINTENANCE THERAPY: ICD-10-CM

## 2019-08-01 LAB
POC AMPHETAMINES: NEGATIVE
POC BARBITURATES: NEGATIVE
POC BENZODIAZEPHINES: POSITIVE
POC COCAINE: NEGATIVE
POC METHADONE: NEGATIVE
POC METHAMPHETAMINE SCREEN URINE: NEGATIVE
POC OPIATES: POSITIVE
POC OXYCODONE: POSITIVE
POC PHENCYCLIDINE: NEGATIVE
POC PROPOXYPHENE: NEGATIVE
POC THC: NEGATIVE
POC TRICYCLIC ANTIDEPRESSANTS: NEGATIVE

## 2019-08-01 PROCEDURE — 80305 DRUG TEST PRSMV DIR OPT OBS: CPT | Performed by: NURSE PRACTITIONER

## 2019-08-01 PROCEDURE — 99214 OFFICE O/P EST MOD 30 MIN: CPT | Performed by: NURSE PRACTITIONER

## 2019-08-01 RX ORDER — HYDROCODONE BITARTRATE AND ACETAMINOPHEN 7.5; 325 MG/1; MG/1
1 TABLET ORAL EVERY 8 HOURS PRN
Qty: 90 TABLET | Refills: 0 | Status: SHIPPED | OUTPATIENT
Start: 2019-08-01 | End: 2019-08-29

## 2019-08-01 RX ORDER — ZOLPIDEM TARTRATE 12.5 MG/1
TABLET, FILM COATED, EXTENDED RELEASE ORAL
COMMUNITY
Start: 2019-07-26

## 2019-08-01 RX ORDER — IBUPROFEN 800 MG/1
TABLET ORAL
Refills: 1 | COMMUNITY
Start: 2019-07-25

## 2019-08-01 RX ORDER — SILDENAFIL CITRATE 20 MG/1
TABLET ORAL
Refills: 6 | COMMUNITY
Start: 2019-06-21 | End: 2019-09-13

## 2019-08-01 NOTE — PROGRESS NOTES
CHIEF COMPLAINT  Follow-up for neck pain. Mr. Flynn states that his pain is unchanged.    Subjective   Dave Flynn is a 49 y.o. male  who presents to the office for follow-up.He has a history of neck pain.    Patient has been experiencing significant levels of spasticity.  Dr. Martinez has referred him to see Dr. Cuello for spasticity management.  He also is recommending that we try cervical epidural blocks.    Complains of pain in his neck. Today his pain is 2/10VAS. Continues with Hydrocodone 7.5/325 2-3/day, Gabapentin 600 mg BID, Baclofen 10 mg q 6 hours. D Denies any side effects from the regimen. The regimen helps decrease his pain by 40-50%. ADL's by self. Is working full-time.      Neck Pain     This is a chronic problem. The current episode started more than 1 year ago. The problem occurs daily. The problem has been gradually improving. The pain is associated with nothing. The pain is present in the left side, right side, midline and occipital region. The quality of the pain is described as aching and burning. The pain is at a severity of 2/10. The pain is severe. The symptoms are aggravated by position and twisting. Associated symptoms include numbness (bilateral arms) and weakness (bilateral arms). Pertinent negatives include no chest pain, fever, headaches (back of the head) or photophobia. He has tried NSAIDs, neck support, muscle relaxants, ice, heat, home exercises and acetaminophen (Percocet) for the symptoms. The treatment provided moderate relief.     PEG Assessment   What number best describes your pain on average in the past week?2  What number best describes how, during the past week, pain has interfered with your enjoyment of life?5  What number best describes how, during the past week, pain has interfered with your general activity?  2    The following portions of the patient's history were reviewed and updated as appropriate: allergies, current medications, past family history, past  "medical history, past social history, past surgical history and problem list.    Review of Systems   Constitutional: Positive for fatigue. Negative for fever.   HENT: Negative for congestion.    Eyes: Negative for photophobia and visual disturbance.   Respiratory: Negative for cough, shortness of breath and wheezing.    Cardiovascular: Negative.  Negative for chest pain.   Gastrointestinal: Negative for constipation and diarrhea.   Genitourinary: Negative for difficulty urinating.   Musculoskeletal: Positive for neck pain.   Neurological: Positive for weakness (bilateral arms) and numbness (bilateral arms). Negative for headaches (back of the head).   Psychiatric/Behavioral: Positive for sleep disturbance. Negative for suicidal ideas. The patient is not nervous/anxious.      Vitals:    08/01/19 1556   BP: 122/81   Pulse: 95   Resp: 18   Temp: 98.3 °F (36.8 °C)   SpO2: 96%   Weight: 81.6 kg (179 lb 12.8 oz)   Height: 175.3 cm (69.02\")   PainSc:   2   PainLoc: Neck     Objective   Physical Exam   Constitutional: He is oriented to person, place, and time. He appears well-developed and well-nourished. No distress.   HENT:   Head: Normocephalic and atraumatic.   Eyes: Conjunctivae are normal.   Neck: Trachea normal. Neck supple. No muscular tenderness present. Decreased range of motion present.   Cardiovascular: Normal rate.   Pulmonary/Chest: Effort normal. No respiratory distress.   Musculoskeletal:        Cervical back: He exhibits decreased range of motion, tenderness, pain and spasm. He exhibits no bony tenderness and no edema.   Neurological: He is alert and oriented to person, place, and time. No cranial nerve deficit. Gait normal.   Psychiatric: He has a normal mood and affect.   Nursing note and vitals reviewed.    Assessment/Plan   Dave was seen today for neck pain.    Diagnoses and all orders for this visit:    Other chronic pain    Postlaminectomy syndrome, cervical region  -     Case Request    Spinal " stenosis of lumbar region with neurogenic claudication    Encounter for monitoring opioid maintenance therapy      --- ABRAM X 2, 2-4 weeks apart.  Reviewed the procedure at length with the patient.  Included in the review was expectations, complications, risk and benefits.The procedure was described in detail and the risks, benefits and alternatives were discussed with the patient (including but not limited to: bleeding, infection, nerve damage, worsening of pain, inability to perform injection, paralysis, seizures, and death) who agreed to proceed.  Discussed the potential for sedation if warranted/wanted.  The procedure will plan to be performed at [facility] with fluoroscopic guidance(unless ultrasound is indicated). Questions were answered and in a way the patient could understand.  Patient verbalized understanding and wishes to proceed.  This intervention will be ordered.  Discussed with patient that all procedures are part of a multimodal plan of care and include either formal PT or a home exercise program.    --- Refill Hydrocodone. Patient appears stable with current regimen. No adverse effects. Regarding continuation of opioids, there is no evidence of aberrant behavior or any red flags.  The patient continues with appropriate response to opioid therapy. ADL's remain intact by self.   --- Routine UDS in office today as part of monitoring requirements for controlled substances.  The specimen was viewed and the immunoassay result reviewed and is +OPI, OXY, BZD.  This specimen will be sent to [facility] laboratory for confirmation.     --- Follow-up 2 months          SINAN REPORT  As part of the patient's treatment plan, I am prescribing controlled substances. The patient has been made aware of appropriate use of such medications, including potential risk of somnolence, limited ability to drive and/or work safely, and the potential for dependence or overdose. It has also bee made clear  that these medications are for use by this patient only, without concomitant use of alcohol or other substances unless prescribed.     Patient has completed prescribing agreement detailing terms of continued prescribing of controlled substances, including monitoring SINAN reports, urine drug screening, and pill counts if necessary. The patient is aware that inappropriate use will results in cessation of prescribing such medications.    SINAN report has been reviewed and scanned into the patient's chart.    As the clinician, I personally reviewed the SINAN from 8/1/19 while the patient was in the office today.    History and physical exam exhibit continued safe and appropriate use of controlled substances.    EMR Dragon/Transcription disclaimer:   Much of this encounter note is an electronic transcription/translation of spoken language to printed text. The electronic translation of spoken language may permit erroneous, or at times, nonsensical words or phrases to be inadvertently transcribed; Although I have reviewed the note for such errors, some may still exist.

## 2019-08-06 ENCOUNTER — RESULTS ENCOUNTER (OUTPATIENT)
Dept: PAIN MEDICINE | Facility: CLINIC | Age: 49
End: 2019-08-06

## 2019-08-06 DIAGNOSIS — M96.1 POSTLAMINECTOMY SYNDROME, CERVICAL REGION: ICD-10-CM

## 2019-08-06 DIAGNOSIS — M48.062 SPINAL STENOSIS OF LUMBAR REGION WITH NEUROGENIC CLAUDICATION: ICD-10-CM

## 2019-08-06 DIAGNOSIS — Z51.81 ENCOUNTER FOR MONITORING OPIOID MAINTENANCE THERAPY: ICD-10-CM

## 2019-08-06 DIAGNOSIS — G89.29 OTHER CHRONIC PAIN: ICD-10-CM

## 2019-08-06 DIAGNOSIS — Z79.891 ENCOUNTER FOR MONITORING OPIOID MAINTENANCE THERAPY: ICD-10-CM

## 2019-08-15 RX ORDER — GABAPENTIN 600 MG/1
TABLET ORAL
Qty: 90 TABLET | Refills: 4 | Status: SHIPPED | OUTPATIENT
Start: 2019-08-15 | End: 2020-02-21 | Stop reason: SDUPTHER

## 2019-08-26 ENCOUNTER — TELEPHONE (OUTPATIENT)
Dept: PAIN MEDICINE | Facility: CLINIC | Age: 49
End: 2019-08-26

## 2019-08-26 NOTE — TELEPHONE ENCOUNTER
The surgery center cancelled his procedure due to bad debt > 1 year.   Opioids won't do any more than he is getting now.  He could reschedule with JES to discuss other nonnarcotic medical options.

## 2019-08-26 NOTE — TELEPHONE ENCOUNTER
Patient called and stated that he spoke with someone from billing to discuss a payment plan for his procedure that he is supposed to have tomorrow. He stated that they wanted him to put $1,000 down and then pay the rest via a payment plan. Her states that he can not afford that at this time. He is not working as much as he used to and he is not at the same job he was at so he isn't making much money. He wanted to know what can be done to help with his pain since he cannot afford the procedures. He mentioned oral meds but it looks like he is already on norco. Please advise.

## 2019-08-27 ENCOUNTER — OFFICE VISIT (OUTPATIENT)
Dept: PAIN MEDICINE | Facility: CLINIC | Age: 49
End: 2019-08-27

## 2019-08-27 VITALS
WEIGHT: 180.4 LBS | RESPIRATION RATE: 20 BRPM | OXYGEN SATURATION: 98 % | DIASTOLIC BLOOD PRESSURE: 78 MMHG | BODY MASS INDEX: 26.72 KG/M2 | HEIGHT: 69 IN | HEART RATE: 82 BPM | TEMPERATURE: 98.1 F | SYSTOLIC BLOOD PRESSURE: 114 MMHG

## 2019-08-27 DIAGNOSIS — G89.29 OTHER CHRONIC PAIN: Primary | ICD-10-CM

## 2019-08-27 DIAGNOSIS — M47.12 CERVICAL SPONDYLOSIS WITH MYELOPATHY: ICD-10-CM

## 2019-08-27 DIAGNOSIS — Z79.891 ENCOUNTER FOR MONITORING OPIOID MAINTENANCE THERAPY: ICD-10-CM

## 2019-08-27 DIAGNOSIS — M50.320 DEGENERATION OF INTERVERTEBRAL DISC OF MID-CERVICAL REGION, UNSPECIFIED SPINAL LEVEL: ICD-10-CM

## 2019-08-27 DIAGNOSIS — M48.062 SPINAL STENOSIS OF LUMBAR REGION WITH NEUROGENIC CLAUDICATION: ICD-10-CM

## 2019-08-27 DIAGNOSIS — M96.1 POSTLAMINECTOMY SYNDROME, CERVICAL REGION: ICD-10-CM

## 2019-08-27 DIAGNOSIS — Z51.81 ENCOUNTER FOR MONITORING OPIOID MAINTENANCE THERAPY: ICD-10-CM

## 2019-08-27 PROCEDURE — 99214 OFFICE O/P EST MOD 30 MIN: CPT | Performed by: NURSE PRACTITIONER

## 2019-08-27 RX ORDER — METHYLPREDNISOLONE 4 MG/1
TABLET ORAL
Qty: 21 TABLET | Refills: 0 | Status: SHIPPED | OUTPATIENT
Start: 2019-08-27 | End: 2019-09-13

## 2019-08-27 RX ORDER — VARENICLINE TARTRATE 1 MG/1
TABLET, FILM COATED ORAL
COMMUNITY
Start: 2019-08-20

## 2019-08-27 RX ORDER — BUPRENORPHINE 10 UG/H
10 PATCH TRANSDERMAL
Qty: 4 PATCH | Refills: 0 | Status: CANCELLED | OUTPATIENT
Start: 2019-08-27

## 2019-08-27 RX ORDER — BUPRENORPHINE 7.5 UG/H
1 PATCH TRANSDERMAL
Qty: 4 PATCH | Refills: 0 | Status: SHIPPED | OUTPATIENT
Start: 2019-08-27 | End: 2019-08-29

## 2019-08-27 NOTE — PROGRESS NOTES
CHIEF COMPLAINT  F/u neck pain. Pt sts pain has remained the same since last ov.     Subjective   Dave Flynn is a 49 y.o. male  who presents to the office for follow-up.He has a history of chronic neck pain. Reports this is unchanged since last office visit.    Patient has been experiencing significant levels of spasticity.  Dr. Martinez has referred him to see Dr. Cuello for spasticity management.  He also is recommending that we try cervical epidural blocks. AT his last office visit, he was ordered to have ABRAM. Unfortunately, he had to cancel yesterday's injection due to a large outstanding balance.  The patient was expected to pay $1000 of his balance off before the surgery center with allow him to proceed with injections.  This is cost prohibitive for him.  Discussed with Dr. Barrientos this morning.  Nothing else to consider in terms of cervical.  If legs continue to be an issue as he had discussed with Dr. Martinez previously we could consider doing an in office caudal epidural injection.      Complains of pain in his neck. Today his pain is 3/10VAS. Continues with Hydrocodone 7.5/325 2-3/day, Gabapentin 600 mg BID (higher doses not tolerated), Baclofen 10 mg 4-6/day. Denies any side effects from the regimen. The regimen helps decrease his pain by 40-50%. ADL's by self. Is working full-time.     Was seen by Dr. Cuello - recommends continuation of baclofen, increased the dose.  Will follow up in the next week or two.      Neck Pain    This is a chronic problem. The current episode started more than 1 year ago. The problem has been gradually worsening ( unchanged since last office visit). The quality of the pain is described as aching, shooting and stabbing. The pain is at a severity of 3/10. The pain is moderate. The symptoms are aggravated by position, stress and twisting. Associated symptoms include weakness (bilateral arms and bilat shoulders). Pertinent negatives include no chest pain, fever,  "headaches (back of the head), numbness or photophobia. He has tried oral narcotics (gabapentin; surgery) for the symptoms.     PEG Assessment   What number best describes your pain on average in the past week?3  What number best describes how, during the past week, pain has interfered with your enjoyment of life?3  What number best describes how, during the past week, pain has interfered with your general activity?  3    The following portions of the patient's history were reviewed and updated as appropriate: allergies, current medications, past family history, past medical history, past social history, past surgical history and problem list.    Review of Systems   Constitutional: Positive for fatigue (occ). Negative for activity change and fever.   HENT: Negative for congestion.    Eyes: Negative for photophobia and visual disturbance.   Respiratory: Negative for cough, shortness of breath and wheezing.    Cardiovascular: Negative.  Negative for chest pain.   Gastrointestinal: Negative for constipation and diarrhea.   Genitourinary: Negative for difficulty urinating.   Musculoskeletal: Positive for neck pain and neck stiffness. Negative for back pain.   Neurological: Positive for weakness (bilateral arms and bilat shoulders). Negative for dizziness, numbness and headaches (back of the head).   Psychiatric/Behavioral: Positive for sleep disturbance. Negative for suicidal ideas. The patient is not nervous/anxious.      Vitals:    08/27/19 1125   BP: 114/78   Pulse: 82   Resp: 20   Temp: 98.1 °F (36.7 °C)   SpO2: 98%   Weight: 81.8 kg (180 lb 6.4 oz)   Height: 175.3 cm (69.02\")   PainSc:   3   PainLoc: Neck     Objective   Physical Exam   Constitutional: He is oriented to person, place, and time. He appears well-developed and well-nourished. No distress.   HENT:   Head: Normocephalic and atraumatic.   Eyes: Conjunctivae are normal.   Neck: Trachea normal. Neck supple. No muscular tenderness present. Decreased range of " motion present.   Cardiovascular: Normal rate.   Pulmonary/Chest: Effort normal. No respiratory distress.   Musculoskeletal:        Cervical back: He exhibits decreased range of motion, tenderness, pain and spasm. He exhibits no bony tenderness and no edema.   Neurological: He is alert and oriented to person, place, and time. No cranial nerve deficit. Gait normal.   Psychiatric: He has a normal mood and affect.   Nursing note and vitals reviewed.    Assessment/Plan   Dave was seen today for neck pain.    Diagnoses and all orders for this visit:    Other chronic pain  -     Ambulatory Referral to Psychology    Cervical spondylosis with myelopathy    Spinal stenosis of lumbar region with neurogenic claudication    Degeneration of intervertebral disc of mid-cervical region, unspecified spinal level  -     Ambulatory Referral to Hospital Pain Management Department    Postlaminectomy syndrome, cervical region    Encounter for monitoring opioid maintenance therapy    Other orders  -     methylPREDNISolone (MEDROL, RICARDA,) 4 MG tablet; Take as directed on package instructions.  -     Cancel: Buprenorphine (BUTRANS) 10 MCG/HR patch weekly; Place 10 mcg on the skin as directed by provider Every 7 (Seven) Days.  -     Buprenorphine 7.5 MCG/HR patch weekly; Place 1 patch on the skin as directed by provider Every 7 (Seven) Days.    --- No more oral pain medicaiton.  Only option will be Butrans moving forward.  Sent in Butrans 7.5mcg/hr patch to pharmacy to initiate therapy with.    --- Opioid risk assessment with Dr. Do   --- The urine drug screen confirmation from 8-1-19 has been reviewed and the result is ABNORMAL(PRESENCE OF NOROXYCODONE) based on patient history and SINAN report  --- The urine drug screen confirmation from 5-17-19 has been reviewed and the result is ABRNORMAL(NEGATIVE FOR HYDROCODONE, POSITIVE FOR OXYCODONE AND METABOLITES) based on patient history and SINAN report  --- Offered to send to the  hospital for ABRAM X 2 where he does not have an outstanding balance - he would like to proceed with this  --- Can always consider in office caudal BASSAM if leg symptoms worsen. He will continue working with spasticity specialist for now.    --- Medrol dose pack to pharmacy  --- Follow-up 1 month       SINAN REPORT  As part of the patient's treatment plan, I am prescribing controlled substances. The patient has been made aware of appropriate use of such medications, including potential risk of somnolence, limited ability to drive and/or work safely, and the potential for dependence or overdose. It has also bee made clear that these medications are for use by this patient only, without concomitant use of alcohol or other substances unless prescribed.     Patient has completed prescribing agreement detailing terms of continued prescribing of controlled substances, including monitoring SINAN reports, urine drug screening, and pill counts if necessary. The patient is aware that inappropriate use will results in cessation of prescribing such medications.    SINAN report has been reviewed and scanned into the patient's chart.    As the clinician, I personally reviewed the SINAN from 8-26-19 while the patient was in the office today.    History and physical exam exhibit continued safe and appropriate use of controlled substances.    EMR Dragon/Transcription disclaimer:   Much of this encounter note is an electronic transcription/translation of spoken language to printed text. The electronic translation of spoken language may permit erroneous, or at times, nonsensical words or phrases to be inadvertently transcribed; Although I have reviewed the note for such errors, some may still exist.

## 2019-08-28 ENCOUNTER — PRIOR AUTHORIZATION (OUTPATIENT)
Dept: PAIN MEDICINE | Facility: CLINIC | Age: 49
End: 2019-08-28

## 2019-09-05 ENCOUNTER — TELEPHONE (OUTPATIENT)
Dept: PAIN MEDICINE | Facility: CLINIC | Age: 49
End: 2019-09-05

## 2019-09-05 NOTE — TELEPHONE ENCOUNTER
Not a know side effect, in fact it usually lowers blood pressure.  When did he discontinue his oral pain medication? This could be a withdrawal symptom from that...

## 2019-09-05 NOTE — TELEPHONE ENCOUNTER
Withdrawal symptoms (if present) should be subsiding by now, usually resolve in 1 week.  When did this symptom begin, is it still going on? When did he start/stop the Belbuca specifically?

## 2019-09-05 NOTE — TELEPHONE ENCOUNTER
Pt called and sts that the Belbuca 75 mcg Gave him a headache and made his temple pop out. Pt sts he discontinued the medication. Pt would like to know if thr is something else he can try.

## 2019-09-06 NOTE — TELEPHONE ENCOUNTER
I spoke with Mr. Flynn and he states that his symptoms began after taking one dose of the Belbuca last Thurs. He states that he didn't take anymore medication after that and the symptoms resolved after 1 and half days. He states that he won't take that medication again.

## 2019-09-10 NOTE — TELEPHONE ENCOUNTER
The only other thing to offer is to try re-submitting BUtrans to insurance now that he has failed Belbuca. They may approve it. Otherwise, we will not prescribe anything other than these medications due to aberrant behavior.

## 2019-09-13 ENCOUNTER — ANESTHESIA EVENT (OUTPATIENT)
Dept: PAIN MEDICINE | Facility: HOSPITAL | Age: 49
End: 2019-09-13

## 2019-09-13 ENCOUNTER — HOSPITAL ENCOUNTER (OUTPATIENT)
Dept: PAIN MEDICINE | Facility: HOSPITAL | Age: 49
Discharge: HOME | End: 2019-09-13
Admitting: ANESTHESIOLOGY

## 2019-09-13 ENCOUNTER — ANESTHESIA (OUTPATIENT)
Dept: PAIN MEDICINE | Facility: HOSPITAL | Age: 49
End: 2019-09-13

## 2019-09-13 ENCOUNTER — APPOINTMENT (OUTPATIENT)
Dept: PAIN MEDICINE | Facility: HOSPITAL | Age: 49
End: 2019-09-13

## 2019-09-13 ENCOUNTER — DOCUMENTATION (OUTPATIENT)
Dept: NEUROSURGERY | Facility: CLINIC | Age: 49
End: 2019-09-13

## 2019-09-13 VITALS
HEART RATE: 79 BPM | RESPIRATION RATE: 16 BRPM | HEIGHT: 69 IN | SYSTOLIC BLOOD PRESSURE: 125 MMHG | BODY MASS INDEX: 26.66 KG/M2 | DIASTOLIC BLOOD PRESSURE: 92 MMHG | TEMPERATURE: 98.3 F | OXYGEN SATURATION: 98 % | WEIGHT: 180 LBS

## 2019-09-13 DIAGNOSIS — M54.16 LUMBAR NEURITIS: Primary | ICD-10-CM

## 2019-09-13 PROCEDURE — G0463 HOSPITAL OUTPT CLINIC VISIT: HCPCS

## 2019-09-13 NOTE — H&P
CHIEF COMPLAINT: Bilateral lower extremity pain      HISTORY OF PRESENT ILLNESS:  He is undergone 2 previous cervical spine procedures.  He was myelopathic prior to his cervical spine surgeries.  Since then he has suffered from spasticity in his lower extremities.  But he currently describes bilateral lower extremity pain which involves the entirety of his legs, constant timing although worse with activity.  6 out of 10 on the pain scale.  Aching in nature.  He has tried baclofen Advil without relief.  His MRI does show some foraminal stenosis    PAST MEDICAL HISTORY:  Current Outpatient Medications on File Prior to Encounter   Medication Sig Dispense Refill   • atenolol-chlorthalidone (TENORETIC) 50-25 MG per tablet Take 1 tablet by mouth Daily.     • atorvastatin (LIPITOR) 20 MG tablet Take  by mouth Daily.     • baclofen (LIORESAL) 10 MG tablet Take 1 tablet by mouth Every 6 (Six) Hours. Take 1 tablet po qhs x 1week, then bid x 1 week, then tid 120 tablet 3   • CHANTIX CONTINUING MONTH RICARDA 1 MG tablet      • gabapentin (NEURONTIN) 600 MG tablet TAKE ONE TABLET BY MOUTH THREE TIMES A DAY 90 tablet 4   • ibuprofen (ADVIL,MOTRIN) 800 MG tablet TAKE 1 TABLET BY MOUTH EVERY 8 HOURS AS NEEDED FOR PAIN-TAKE WITH FOOD  1   • levocetirizine (XYZAL) 5 MG tablet Take 5 mg by mouth Daily.  4   • lidocaine (LIDODERM) 5 % Place 2 patches on the skin Daily. Remove & Discard patch within 12 hours or as directed by MD 60 patch 0   • zolpidem CR (AMBIEN CR) 12.5 MG CR tablet      • [DISCONTINUED] ALPRAZolam (XANAX) 0.25 MG tablet Take 0.25 mg by mouth Daily.  0   • [DISCONTINUED] Buprenorphine HCl (BELBUCA) 75 MCG film Apply 75 mcg to cheek Every 12 (Twelve) Hours. 60 each 0   • [DISCONTINUED] methylPREDNISolone (MEDROL, RICARDA,) 4 MG tablet Take as directed on package instructions. 21 tablet 0   • [DISCONTINUED] sildenafil (REVATIO) 20 MG tablet TAKE 1 TO 5 TABLETS BY MOUTH DAILY AS NEEDED  6     No current facility-administered  "medications on file prior to encounter.        Past Medical History:   Diagnosis Date   • Cervical disc disorder     HERNIATION AND COMPRESSION, RADIATES DOWN BUE WITH TINGLING   • Hypercholesterolemia    • Hypertension    • Migraine    • Neck pain    • Peripheral neuropathy    • Prediabetes          SOCIAL HISTORY:  No tobacco    REVIEW OF SYSTEMS:  No hematologic infectious or constitutional symptoms  Other review of systems non-contributory  Negative screen for MAX      PHYSICAL EXAM:  /92 (BP Location: Right arm, Patient Position: Sitting)   Pulse 79   Temp 36.8 °C (98.3 °F) (Oral)   Resp 16   Ht 175.3 cm (69\")   Wt 81.6 kg (180 lb)   SpO2 98%   BMI 26.58 kg/m²   Well-developed well-nourished no acute distress  Extra ocular movements intact  Mallampati class 2 airway  Cardiac:  Regular rate and rhythm  Lungs:  Clear to auscultation bilaterally with good effort  Alert and oriented ×3  Deep tendon reflexes increased l in the bilateral patella  Negative straight leg raise bilaterally  5 out of 5 strength bilateral upper and lower extremities  Lumbar spine without obvious deformities ecchymoses  Lumbar spine nontender to palpation      DIAGNOSIS:  Post-Op Diagnosis Codes:     * Degenerative disc disease, cervical [M50.30]     * Lumbar neuritis [M54.16]    PLAN:  I discussed this with Dr. Martinez.  If we were treating for the spasticity we would need a cervical epidural however we feel its more of pain that he has in his legs which is worse while walking, therefore we will treat the lumbar neuritis with a lumbar epidural injection.  1.  Lumbar 4 epidural steroid injections, up to 3, spaced 1-2 weeks apart.  If pain control is acceptable after 1 or 2 injections, it was discussed with the patient that they may return for the subsequent injections if and when their pain returns.  The risks were discussed with the patient including failure of relief, worsening pain, Headache (post dural puncture " headache), bleeding (epidural hematoma) and infection (epidural abscess or skin infection).  2.  Physical therapy exercises at home as prescribed by physical therapy or from the pain clinic handout (given to the patient).  Continuation of these exercises every day, or multiple times per week, even when the patient has good pain relief, was stressed to the patient as a preventative measure to decrease the frequency and severity of future pain episodes.  3.  Continue pain medicines as already prescribed.  If patient not currently taking any, it is recommended to begin Acetaminophen 1000 mg po q 8 hours.  If other medicines containing Acetaminophen are currently prescribed, maintain daily dose at 3000 mg.    4.  If they can tolerate NSAIDS, it is recommended to take Ibuprofen 600 mg po q 6 hours for 7 days during pain exacerbations.  Alternatively, they may substitute an NSAID of their choice (e.g. Aleve).  This may be taken at the same time as Acetaminophen.  5.  Heat and ice to the affected area as tolerated for pain control.  It was discussed that heating pads can cause burns.  6.  Daily low impact exercise such as walking or water exercise was recommended to maintain overall health and aid in weight control.   7.  Follow up as needed for subsequent injections.  8.  Patient was counseled to abstain from tobacco products.

## 2019-09-13 NOTE — PROGRESS NOTES
I spoke with Dr. Kern about this patient.  He was sent by Dr. Barrientos to have blocks at the pain clinic.  The intention was to for him to have lumbar epidural blocks not cervical epidural blocks.  Dr. Kern noted that he was spastic which is a course from his myelopathy.  I told him to just hold off on doing blocks and have him see Dr. Barrientos next week.  I think lumbar epidural blocks are fine given that he has a history of lumbar stenosis and leg pain.

## 2019-09-13 NOTE — ADDENDUM NOTE
Addendum  created 09/13/19 8977 by Raymond Kern MD    Diagnosis association updated, Order Reconciliation Section accessed, Order list changed, Visit diagnoses modified

## 2019-09-19 ENCOUNTER — APPOINTMENT (OUTPATIENT)
Dept: PAIN MEDICINE | Facility: HOSPITAL | Age: 49
End: 2019-09-19

## 2019-09-25 ENCOUNTER — ANESTHESIA EVENT (OUTPATIENT)
Dept: PAIN MEDICINE | Facility: HOSPITAL | Age: 49
End: 2019-09-25

## 2019-09-25 ENCOUNTER — HOSPITAL ENCOUNTER (OUTPATIENT)
Dept: PAIN MEDICINE | Facility: HOSPITAL | Age: 49
Discharge: HOME | End: 2019-09-25
Admitting: ANESTHESIOLOGY

## 2019-09-25 ENCOUNTER — ANESTHESIA (OUTPATIENT)
Dept: PAIN MEDICINE | Facility: HOSPITAL | Age: 49
End: 2019-09-25

## 2019-09-25 ENCOUNTER — HOSPITAL ENCOUNTER (OUTPATIENT)
Dept: GENERAL RADIOLOGY | Facility: HOSPITAL | Age: 49
Discharge: HOME | End: 2019-09-25

## 2019-09-25 VITALS
HEART RATE: 57 BPM | DIASTOLIC BLOOD PRESSURE: 100 MMHG | OXYGEN SATURATION: 95 % | RESPIRATION RATE: 16 BRPM | SYSTOLIC BLOOD PRESSURE: 112 MMHG

## 2019-09-25 DIAGNOSIS — R52 PAIN: ICD-10-CM

## 2019-09-25 DIAGNOSIS — M54.16 LUMBAR NEURITIS: ICD-10-CM

## 2019-09-25 PROCEDURE — 77003 FLUOROGUIDE FOR SPINE INJECT: CPT

## 2019-09-25 PROCEDURE — C1755 CATHETER, INTRASPINAL: HCPCS

## 2019-09-25 PROCEDURE — 25010000002 METHYLPREDNISOLONE PER 80 MG: Performed by: ANESTHESIOLOGY

## 2019-09-25 PROCEDURE — 25010000002 MIDAZOLAM PER 1 MG: Performed by: ANESTHESIOLOGY

## 2019-09-25 RX ORDER — SODIUM CHLORIDE 0.9 % (FLUSH) 0.9 %
1-10 SYRINGE (ML) INJECTION AS NEEDED
Status: DISCONTINUED | OUTPATIENT
Start: 2019-09-25 | End: 2019-09-26 | Stop reason: HOSPADM

## 2019-09-25 RX ORDER — MIDAZOLAM HYDROCHLORIDE 1 MG/ML
1 INJECTION INTRAMUSCULAR; INTRAVENOUS AS NEEDED
Status: DISCONTINUED | OUTPATIENT
Start: 2019-09-25 | End: 2019-09-26 | Stop reason: HOSPADM

## 2019-09-25 RX ORDER — METHYLPREDNISOLONE ACETATE 80 MG/ML
80 INJECTION, SUSPENSION INTRA-ARTICULAR; INTRALESIONAL; INTRAMUSCULAR; SOFT TISSUE ONCE
Status: COMPLETED | OUTPATIENT
Start: 2019-09-25 | End: 2019-09-25

## 2019-09-25 RX ORDER — FENTANYL CITRATE 50 UG/ML
50 INJECTION, SOLUTION INTRAMUSCULAR; INTRAVENOUS AS NEEDED
Status: DISCONTINUED | OUTPATIENT
Start: 2019-09-25 | End: 2019-09-26 | Stop reason: HOSPADM

## 2019-09-25 RX ORDER — LIDOCAINE HYDROCHLORIDE 10 MG/ML
1 INJECTION, SOLUTION INFILTRATION; PERINEURAL ONCE AS NEEDED
Status: DISCONTINUED | OUTPATIENT
Start: 2019-09-25 | End: 2019-09-26 | Stop reason: HOSPADM

## 2019-09-25 RX ADMIN — MIDAZOLAM 2 MG: 1 INJECTION INTRAMUSCULAR; INTRAVENOUS at 13:04

## 2019-09-25 RX ADMIN — METHYLPREDNISOLONE ACETATE 80 MG: 80 INJECTION, SUSPENSION INTRA-ARTICULAR; INTRALESIONAL; INTRAMUSCULAR; SOFT TISSUE at 13:07

## 2019-09-25 NOTE — ANESTHESIA PROCEDURE NOTES
PAIN Epidural block      Patient reassessed immediately prior to procedure    Patient location during procedure: pain clinic  Start Time: 9/25/2019 1:03 PM  Stop Time: 9/25/2019 1:16 PM  Indication:procedure for pain  Performed By  Anesthesiologist: Raymond Kern MD  Preanesthetic Checklist  Completed: patient identified and risks and benefits discussed  Additional Notes  Diagnosis:     Post-Op Diagnosis Codes:     * Lumbar neuritis (M54.16)    Sedation: Versed 2 mg  A lumbar epidural steroid injection with fluoroscopic guidance was performed.  Under fluoroscopic guidance, the epidural space was identified and accessed, confirmed by loss of resistance to saline.  The above medications were injected uneventfully.    Prep:  Pt Position:prone  Sterile Tech:cap, gloves, mask and sterile barrier  Prep:chlorhexidine gluconate and isopropyl alcohol  Monitoring:blood pressure monitoring, continuous pulse oximetry and EKG  Procedure:Sedation: yes     Approach:midline  Guidance: fluoroscopy  Location:lumbar  Level:4-5  Needle Type:Tuohy  Needle Gauge:20  Aspiration:negative  Medications:  Depomedrol:80  Preservative Free Saline:1mL    Post Assessment:  Pt Tolerance:patient tolerated the procedure well with no apparent complications  Complications:no

## 2019-09-25 NOTE — H&P
INTERVAL HISTORY:    He was previously seen in consult.  He returns today for his lumbar epidural to treat his lumbar neuritis.      Current Outpatient Medications on File Prior to Encounter   Medication Sig Dispense Refill   • atenolol-chlorthalidone (TENORETIC) 50-25 MG per tablet Take 1 tablet by mouth Daily.     • atorvastatin (LIPITOR) 20 MG tablet Take  by mouth Daily.     • baclofen (LIORESAL) 10 MG tablet Take 1 tablet by mouth Every 6 (Six) Hours. Take 1 tablet po qhs x 1week, then bid x 1 week, then tid 120 tablet 3   • CHANTIX CONTINUING MONTH RICARDA 1 MG tablet      • gabapentin (NEURONTIN) 600 MG tablet TAKE ONE TABLET BY MOUTH THREE TIMES A DAY 90 tablet 4   • ibuprofen (ADVIL,MOTRIN) 800 MG tablet TAKE 1 TABLET BY MOUTH EVERY 8 HOURS AS NEEDED FOR PAIN-TAKE WITH FOOD  1   • levocetirizine (XYZAL) 5 MG tablet Take 5 mg by mouth Daily.  4   • lidocaine (LIDODERM) 5 % Place 2 patches on the skin Daily. Remove & Discard patch within 12 hours or as directed by MD 60 patch 0   • zolpidem CR (AMBIEN CR) 12.5 MG CR tablet        No current facility-administered medications on file prior to encounter.        Past Medical History:   Diagnosis Date   • Cervical disc disorder     HERNIATION AND COMPRESSION, RADIATES DOWN BUE WITH TINGLING   • Hypercholesterolemia    • Hypertension    • Migraine    • Neck pain    • Peripheral neuropathy    • Prediabetes        No hematologic infectious or constitutional symptoms  Negative screen for MAX      Exam:  There were no vitals taken for this visit.  Airway [place]ampatti 2  Alert and oriented      Diagnosis:  Post-Op Diagnosis Codes:     * Lumbar neuritis [M54.16]    Plan:  Lumbar 4 epidural steroid injection under fluoroscopic guidance    I have encouraged them to continue:  1.  Physical therapy exercises at home as prescribed by physical therapy or from the pain clinic handout (given to the patient).  Continuation of these exercises every day, or multiple times per week, even  when the patient has good pain relief, was stressed to the patient as a preventative measure to decrease the frequency and severity of future pain episodes.  2.  Continue pain medicines as already prescribed.  If patient not currently taking any, it is recommended to begin Acetaminophen 1000 mg po q 8 hours.  If other medicines containing Acetaminophen are currently prescribed, maintain daily dose at 3000mg.    3.  If they can tolerate NSAIDS, it is recommended to take Ibuprofen 600 mg po q 6 hours for 7 days during pain exacerbations.   Alternatively, they may substitute an NSAID of their choice (e.g. Aleve)  4.  Heat and ice to the affected area as tolerated for pain control.  It was discussed that heating pads can cause burns.  5.  Low impact exercise such as walking or water exercise was recommended to maintain overall health and aid in weight control.   6.  Follow up as needed for subsequent injections.  7.  Patient was counseled to abstain from tobacco products.

## 2019-10-11 NOTE — PROGRESS NOTES
Subjective   Patient ID: Dave Flynn is a 49 y.o. male is here today for follow-up after BASSAM at [facility] and evaluation with Dr Cuello. Patient states that Dr Cuello prescribed Baclofen (2) 10 mg TID and it is not helping much.  He is to call his office to make a follow up appt.  He is also taking Gabapentin 600 mg TID    He has had one BASSAM at [facility] that helped with his pain, but did not help with his balance and gait issues.  He is scheduled for a 2nd one next week.    Patient was last seen 6.14.19 for progression of his spasticity, bilateral leg pain, weakness, neck and upper arm pain.    He states that he is currently having constant spasticity that is constant and bilateral leg pain, he denies low back pain.  He still has problems with his balance and gait.  He still has intermittent neck pain/stiffness and bilateral arm pain.  He has intermittent arm weakness and constant weakness bilateral legs    Mr. Flynn has a history of a previous ACDF at C4-C5 C5-C6 C6-C7 in February 2016 as well as posterior cervical decompression and fusion at C3-C4 in September 2018.     It has been 4 months since I have seen him.  He has seen Dr. Cuello and his baclofen dose has been raised.  He did get a lumbar epidural block and it helped his back pain, but not the legs which really I think is probably more related to his spasticity than anything else.  He still has a lot of neck pain.  I told him that a cervical epidural block given his posterior fusion is not a good idea, but we might try some dry needling.  He has never tried that before.  He continues to work with difficulty.  Dr. Cuello had mentioned Botox injections, but he is not going that route for now.  Will try the dry needling and have him come back in 4 months.                  Difficulty Walking   The problem occurs constantly. The problem has been unchanged. Associated symptoms include neck pain. Pertinent negatives include no arthralgias or myalgias.   Extremity  Weakness    The pain is present in the left lower leg, right upper leg, right lower leg, left arm and right arm. The problem occurs constantly. The problem has been unchanged.   Leg Pain    The pain is present in the left leg and right leg. The pain is at a severity of 5/10. The pain is moderate. The pain has been improving since onset.   Neck Pain    The problem occurs intermittently. The problem has been gradually improving. The pain is present in the midline. The pain is at a severity of 2/10. The pain is mild. Associated symptoms include leg pain.   Arm Pain    The pain is present in the left forearm, upper left arm, upper right arm and right forearm. The pain is at a severity of 2/10. The pain is mild. The pain has been intermittent since the incident.       The following portions of the patient's history were reviewed and updated as appropriate: allergies, current medications, past family history, past medical history, past social history, past surgical history and problem list.    Review of Systems   Musculoskeletal: Positive for extremity weakness, gait problem, neck pain and neck stiffness. Negative for arthralgias and myalgias.   All other systems reviewed and are negative.      Objective   Physical Exam   Constitutional: He is oriented to person, place, and time. He appears well-developed and well-nourished.   HENT:   Head: Normocephalic and atraumatic.   Eyes: Conjunctivae and EOM are normal. Pupils are equal, round, and reactive to light.   Fundoscopic exam:       The right eye shows no papilledema. The right eye shows venous pulsations.        The left eye shows no papilledema. The left eye shows venous pulsations.   Neck: Carotid bruit is not present.   Neurological: He is oriented to person, place, and time. He has a normal Finger-Nose-Finger Test and a normal Heel to Shin Test. Gait normal.   Reflex Scores:       Tricep reflexes are 4+ on the right side and 4+ on the left side.       Bicep reflexes  are 4+ on the right side and 4+ on the left side.       Brachioradialis reflexes are 4+ on the right side and 4+ on the left side.       Patellar reflexes are 4+ on the right side and 4+ on the left side.       Achilles reflexes are 4+ on the right side and 4+ on the left side.  Psychiatric: His speech is normal.     Neurologic Exam     Mental Status   Oriented to person, place, and time.   Registration of memory: Good recent and remote memory.   Attention: normal. Concentration: normal.   Speech: speech is normal   Level of consciousness: alert  Knowledge: consistent with education.     Cranial Nerves     CN II   Visual fields full to confrontation.   Visual acuity: normal    CN III, IV, VI   Pupils are equal, round, and reactive to light.  Extraocular motions are normal.     CN V   Facial sensation intact.   Right corneal reflex: normal  Left corneal reflex: normal    CN VII   Facial expression full, symmetric.   Right facial weakness: none  Left facial weakness: none    CN VIII   Hearing: intact    CN IX, X   Palate: symmetric    CN XI   Right sternocleidomastoid strength: normal  Left sternocleidomastoid strength: normal    CN XII   Tongue: not atrophic  Tongue deviation: none    Motor Exam   Muscle bulk: normal  Right arm tone: normal  Left arm tone: normal  Right leg tone: normal  Left leg tone: normal    Strength   Strength 5/5 except as noted.     Sensory Exam   Light touch normal.     Gait, Coordination, and Reflexes     Gait  Gait: normal    Coordination   Finger to nose coordination: normal  Heel to shin coordination: normal    Reflexes   Right brachioradialis: 4+  Left brachioradialis: 4+  Right biceps: 4+  Left biceps: 4+  Right triceps: 4+  Left triceps: 4+  Right patellar: 4+  Left patellar: 4+  Right achilles: 4+  Left achilles: 4+  Right : 4+  Left : 4+  Right Butt: present  Left Butt: present      Assessment/Plan   Independent Review of Radiographic Studies:    I which did show some  lateral recess stenosis at L4-5.  The thoracic MRI done recently is unremarkable.      Medical Decision Making:    He will continue working with Dr. Cuello.  I will let him handle the baclofen for now.  We will try some dry needling of the neck and have him come back in 4 months.      Dave was seen today for difficulty walking, leg pain, extremity weakness, neck pain and arm pain.    Diagnoses and all orders for this visit:    Chronic neck pain  -     Ambulatory Referral to Physical Therapy    Spasticity  -     Ambulatory Referral to Physical Therapy    Spinal stenosis of lumbar region with neurogenic claudication      Return in about 4 months (around 2/25/2020).

## 2019-10-16 ENCOUNTER — APPOINTMENT (OUTPATIENT)
Dept: PAIN MEDICINE | Facility: HOSPITAL | Age: 49
End: 2019-10-16

## 2019-10-25 ENCOUNTER — OFFICE VISIT (OUTPATIENT)
Dept: NEUROSURGERY | Facility: CLINIC | Age: 49
End: 2019-10-25

## 2019-10-25 VITALS
BODY MASS INDEX: 26.57 KG/M2 | HEART RATE: 74 BPM | HEIGHT: 69 IN | SYSTOLIC BLOOD PRESSURE: 138 MMHG | DIASTOLIC BLOOD PRESSURE: 74 MMHG

## 2019-10-25 DIAGNOSIS — G89.29 CHRONIC NECK PAIN: Primary | ICD-10-CM

## 2019-10-25 DIAGNOSIS — M54.2 CHRONIC NECK PAIN: Primary | ICD-10-CM

## 2019-10-25 DIAGNOSIS — M48.062 SPINAL STENOSIS OF LUMBAR REGION WITH NEUROGENIC CLAUDICATION: ICD-10-CM

## 2019-10-25 DIAGNOSIS — R25.2 SPASTICITY: ICD-10-CM

## 2019-10-25 PROCEDURE — 99214 OFFICE O/P EST MOD 30 MIN: CPT | Performed by: NEUROLOGICAL SURGERY

## 2019-10-30 ENCOUNTER — ANESTHESIA (OUTPATIENT)
Dept: PAIN MEDICINE | Facility: HOSPITAL | Age: 49
End: 2019-10-30

## 2019-10-30 ENCOUNTER — ANESTHESIA EVENT (OUTPATIENT)
Dept: PAIN MEDICINE | Facility: HOSPITAL | Age: 49
End: 2019-10-30

## 2019-10-30 ENCOUNTER — HOSPITAL ENCOUNTER (OUTPATIENT)
Dept: GENERAL RADIOLOGY | Facility: HOSPITAL | Age: 49
Discharge: HOME | End: 2019-10-30

## 2019-10-30 ENCOUNTER — HOSPITAL ENCOUNTER (OUTPATIENT)
Dept: PAIN MEDICINE | Facility: HOSPITAL | Age: 49
Discharge: HOME | End: 2019-10-30
Admitting: ANESTHESIOLOGY

## 2019-10-30 VITALS
HEART RATE: 75 BPM | BODY MASS INDEX: 25.92 KG/M2 | TEMPERATURE: 97.9 F | HEIGHT: 69 IN | SYSTOLIC BLOOD PRESSURE: 128 MMHG | RESPIRATION RATE: 16 BRPM | OXYGEN SATURATION: 98 % | WEIGHT: 175 LBS | DIASTOLIC BLOOD PRESSURE: 89 MMHG

## 2019-10-30 DIAGNOSIS — R52 PAIN: ICD-10-CM

## 2019-10-30 DIAGNOSIS — M54.16 LUMBAR NEURITIS: ICD-10-CM

## 2019-10-30 PROCEDURE — 77003 FLUOROGUIDE FOR SPINE INJECT: CPT

## 2019-10-30 PROCEDURE — 25010000002 METHYLPREDNISOLONE PER 80 MG: Performed by: ANESTHESIOLOGY

## 2019-10-30 PROCEDURE — C1755 CATHETER, INTRASPINAL: HCPCS

## 2019-10-30 PROCEDURE — 25010000002 MIDAZOLAM PER 1 MG: Performed by: ANESTHESIOLOGY

## 2019-10-30 RX ORDER — SODIUM CHLORIDE 0.9 % (FLUSH) 0.9 %
3-10 SYRINGE (ML) INJECTION AS NEEDED
Status: DISCONTINUED | OUTPATIENT
Start: 2019-10-30 | End: 2019-10-31 | Stop reason: HOSPADM

## 2019-10-30 RX ORDER — MIDAZOLAM HYDROCHLORIDE 1 MG/ML
1 INJECTION INTRAMUSCULAR; INTRAVENOUS
Status: DISCONTINUED | OUTPATIENT
Start: 2019-10-30 | End: 2019-10-31 | Stop reason: HOSPADM

## 2019-10-30 RX ORDER — METHYLPREDNISOLONE ACETATE 80 MG/ML
80 INJECTION, SUSPENSION INTRA-ARTICULAR; INTRALESIONAL; INTRAMUSCULAR; SOFT TISSUE ONCE
Status: COMPLETED | OUTPATIENT
Start: 2019-10-30 | End: 2019-10-30

## 2019-10-30 RX ORDER — LIDOCAINE HYDROCHLORIDE 10 MG/ML
1 INJECTION, SOLUTION INFILTRATION; PERINEURAL ONCE
Status: DISCONTINUED | OUTPATIENT
Start: 2019-10-30 | End: 2019-10-31 | Stop reason: HOSPADM

## 2019-10-30 RX ORDER — FENTANYL CITRATE 50 UG/ML
50 INJECTION, SOLUTION INTRAMUSCULAR; INTRAVENOUS AS NEEDED
Status: DISCONTINUED | OUTPATIENT
Start: 2019-10-30 | End: 2019-10-31 | Stop reason: HOSPADM

## 2019-10-30 RX ORDER — SODIUM CHLORIDE 0.9 % (FLUSH) 0.9 %
3 SYRINGE (ML) INJECTION EVERY 12 HOURS SCHEDULED
Status: DISCONTINUED | OUTPATIENT
Start: 2019-10-30 | End: 2019-10-31 | Stop reason: HOSPADM

## 2019-10-30 RX ADMIN — MIDAZOLAM 2 MG: 1 INJECTION INTRAMUSCULAR; INTRAVENOUS at 07:49

## 2019-10-30 RX ADMIN — METHYLPREDNISOLONE ACETATE 80 MG: 80 INJECTION, SUSPENSION INTRA-ARTICULAR; INTRALESIONAL; INTRAMUSCULAR; SOFT TISSUE at 07:53

## 2019-10-30 NOTE — H&P
INTERVAL HISTORY:    The patient returns for another Lumbar epidural steroid injection 2 today.  They have received 10 % improvement since their last injection with a pain level of 10 /10 at its worst recently.    Conservative measures tried in the interim. Daily activities are still affected by the pain.    Radiology reports and/or previous notes have been reviewed and are consistent with their diagnosis of Post-Op Diagnosis Codes:     * Lumbar neuritis [M54.16]    Alert and oriented  MP - 2  Lungs - clear  CV - rrr    Diagnosis:  Post-Op Diagnosis Codes:     * Lumbar neuritis [M54.16]      Plan:  Lumbar epidural steroid injection under fluoroscopic guidance        Target : L4-5    I have encouraged them to continue:  1.  Physical therapy exercises at home as prescribed by physical therapy or from the pain clinic handout (given to the patient).  Continuation of these exercises every day, or multiple times per week, even when the patient has good pain relief, was stressed to the patient as a preventative measure to decrease the frequency and severity of future pain episodes.  2.  Continue pain medicines as already prescribed.  If patient not currently taking any, it is recommended to begin Acetaminophen 1000 mg po q 8 hours.  If other medicines containing Acetaminophen are currently prescribed, maintain daily dose at 3000mg.    3.  If they can tolerate NSAIDS, it is recommended to take Ibuprofen 600 mg po q 6 hours for 7 days during pain exacerbations.   Alternatively, they may substitute an NSAID of their choice (e.g. Aleve)  4.  Heat and ice to the affected area as tolerated for pain control.  It was discussed that heating pads can cause burns.  5.  Low impact exercise such as walking or water exercise was recommended to maintain overall health and aid in weight control.   6.  Follow up as needed for subsequent injections.  7.  Patient was counseled to abstain from tobacco products.

## 2019-10-30 NOTE — ANESTHESIA PROCEDURE NOTES
PAIN Epidural block    Pre-sedation assessment completed: 10/30/2019 7:46 AM    Patient reassessed immediately prior to procedure    Patient location during procedure: pain clinic  Start Time: 10/30/2019 7:46 AM  Stop Time: 10/30/2019 7:54 AM  Indication:at surgeon's request and procedure for pain  Performed By  Anesthesiologist: Juan Carlos Lowe MD  Preanesthetic Checklist  Completed: patient identified, site marked, surgical consent, pre-op evaluation, timeout performed, IV checked, risks and benefits discussed and monitors and equipment checked  Additional Notes  Dx:  Post-Op Diagnosis Codes:     * Lumbar neuritis (M54.16)  80 mg depomedrol in epid    Plan : return to clinic as needed  Prep:  Pt Position:prone (prone)  Sterile Tech:cap, gloves, mask and sterile barrier  Prep:chlorhexidine gluconate and isopropyl alcohol  Monitoring:blood pressure monitoring, EKG and continuous pulse oximetry  Procedure:Sedation: yes     Approach:midline  Guidance: fluoroscopy and c arm pa and lat and loss of resistance  Location:lumbar  Level:4-5 (interlaminar)  Needle Type:DataSpheretead  Needle Gauge:20  Aspiration:negative  Medications:  Depomedrol:80 mg  Preservative Free Saline:3mL    Post Assessment:  Post-procedure: bandaid.  Pt Tolerance:patient tolerated the procedure well with no apparent complications  Complications:no

## 2019-11-07 ENCOUNTER — TELEPHONE (OUTPATIENT)
Dept: NEUROSURGERY | Facility: CLINIC | Age: 49
End: 2019-11-07

## 2019-11-07 RX ORDER — BACLOFEN 10 MG/1
10 TABLET ORAL EVERY 6 HOURS
Qty: 120 TABLET | Refills: 3 | Status: SHIPPED | OUTPATIENT
Start: 2019-11-07

## 2020-02-15 RX ORDER — GABAPENTIN 600 MG/1
TABLET ORAL
Qty: 90 TABLET | Refills: 3 | OUTPATIENT
Start: 2020-02-15

## 2020-02-18 ENCOUNTER — HOSPITAL ENCOUNTER (OUTPATIENT)
Dept: GENERAL RADIOLOGY | Facility: HOSPITAL | Age: 50
Discharge: HOME | End: 2020-02-18
Admitting: FAMILY MEDICINE

## 2020-02-18 DIAGNOSIS — R52 PAIN: ICD-10-CM

## 2020-02-18 PROCEDURE — 73560 X-RAY EXAM OF KNEE 1 OR 2: CPT

## 2020-02-21 ENCOUNTER — TELEPHONE (OUTPATIENT)
Dept: NEUROSURGERY | Facility: CLINIC | Age: 50
End: 2020-02-21

## 2020-02-21 RX ORDER — GABAPENTIN 600 MG/1
TABLET ORAL
Qty: 90 TABLET | Refills: 3 | OUTPATIENT
Start: 2020-02-21

## 2020-02-21 RX ORDER — GABAPENTIN 600 MG/1
600 TABLET ORAL 3 TIMES DAILY
Qty: 90 TABLET | Refills: 4 | Status: SHIPPED | OUTPATIENT
Start: 2020-02-21 | End: 2020-07-08

## 2020-06-01 ENCOUNTER — HOSPITAL ENCOUNTER (OUTPATIENT)
Dept: GENERAL RADIOLOGY | Facility: HOSPITAL | Age: 50
Discharge: HOME | End: 2020-06-01
Admitting: FAMILY MEDICINE

## 2020-06-01 ENCOUNTER — HOSPITAL ENCOUNTER (OUTPATIENT)
Dept: GENERAL RADIOLOGY | Facility: HOSPITAL | Age: 50
Discharge: HOME | End: 2020-06-01

## 2020-06-01 DIAGNOSIS — R52 PAIN: ICD-10-CM

## 2020-06-01 PROCEDURE — 74018 RADEX ABDOMEN 1 VIEW: CPT

## 2020-07-08 RX ORDER — GABAPENTIN 600 MG/1
TABLET ORAL
Qty: 90 TABLET | Refills: 3 | Status: SHIPPED | OUTPATIENT
Start: 2020-07-08

## 2020-07-09 ENCOUNTER — TELEPHONE (OUTPATIENT)
Dept: NEUROSURGERY | Facility: CLINIC | Age: 50
End: 2020-07-09

## 2020-07-09 NOTE — TELEPHONE ENCOUNTER
LM for patient letting him know that his medication has been refilled, however, he needs to make a follow up appt

## 2020-07-09 NOTE — TELEPHONE ENCOUNTER
----- Message from Fred Martinez MD sent at 7/8/2020  5:31 PM EDT -----  I refilled his gabapentin but let him know he needs to come back at some point to see us so that we can reevaluate him and continue to prescribe this.  The visit does not need to be urgent.

## 2020-11-05 RX ORDER — GABAPENTIN 600 MG/1
TABLET ORAL
Qty: 90 TABLET | Refills: 2 | OUTPATIENT
Start: 2020-11-05

## 2021-03-07 ENCOUNTER — HOSPITAL ENCOUNTER (OUTPATIENT)
Dept: GENERAL RADIOLOGY | Facility: HOSPITAL | Age: 51
Discharge: HOME | End: 2021-03-07
Admitting: FAMILY MEDICINE

## 2021-03-07 DIAGNOSIS — R52 PAIN: ICD-10-CM

## 2021-03-07 PROCEDURE — 72050 X-RAY EXAM NECK SPINE 4/5VWS: CPT

## 2021-03-26 ENCOUNTER — BULK ORDERING (OUTPATIENT)
Dept: CASE MANAGEMENT | Facility: OTHER | Age: 51
End: 2021-03-26

## 2021-03-26 DIAGNOSIS — Z23 IMMUNIZATION DUE: ICD-10-CM

## 2022-02-23 NOTE — DISCHARGE INSTRUCTIONS
Lumbar Epidural Steroid Injection Instructions  Plan includes:  1.  Lumbar epidural steroid injections, up to 3, spaced 4 weeks apart.  If pain control is acceptable after 1 or 2 injections, it was discussed with the patient that they may return for the subsequent injections if and when their pain returns.  The risks were discussed with the patient including failure of relief, worsening pain, Headache (post dural puncture headache), bleeding (epidural hematoma) and infection (epidural abscess or skin infection).  2.  Physical therapy exercises at home as prescribed by physical therapy or from the pain clinic handout (given to the patient).  Continuation of these exercises every day, or multiple times per week, even when the patient has good pain relief, was stressed to the patient as a preventative measure to decrease the frequency and severity of future pain episodes.  3.  Continue pain medicines as already prescribed.  If patient not currently taking any, it is recommended to begin Acetaminophen 1000 mg po q 8 hours.  If other medicines containing Acetaminophen are currently prescribed, maintain daily dose at 3000 mg.    4.  If they can tolerate NSAIDS, it is recommended to take Ibuprofen 600 mg po q 6 hours for 7 days during pain exacerbations.  Alternatively, they may substitute an NSAID of their choice (e.g. Aleve).  This may be taken at the same time as Acetaminophen.  5.  Heat and ice to the affected area as tolerated for pain control.  It was discussed that heating pads can cause burns.  6.  Daily low impact exercise such as walking or water exercise was recommended to maintain overall health and aid in weight control.   7.  Follow up as needed for subsequent injections.  8.  Patient was counseled to abstain from tobacco products.    
Patient

## 2022-05-22 NOTE — TELEPHONE ENCOUNTER
Ok. Nothing else to offer then other than interventional options.   Pt has no pain or complaints at this time

## (undated) DEVICE — DISPOSABLE BIPOLAR FORCEPS 7 3/4" (19.7CM) SCOVILLE BAYONET, 1.5MM TIP AND 12 FT. (3.6M) CABLE: Brand: KIRWAN

## (undated) DEVICE — ANTIBACTERIAL UNDYED BRAIDED (POLYGLACTIN 910), SYNTHETIC ABSORBABLE SUTURE: Brand: COATED VICRYL

## (undated) DEVICE — OIL CARTRIDGE: Brand: CORE, MAESTRO

## (undated) DEVICE — DRAPE,REIN 53X77,STERILE: Brand: MEDLINE

## (undated) DEVICE — 3.0MM PRECISION NEURO (MATCH HEAD)

## (undated) DEVICE — DRP MICROSCP LEICA VARILENS2 132X406CM

## (undated) DEVICE — DRSNG WND GZ PAD BORDERED 4X8IN STRL

## (undated) DEVICE — 1.1MM X 6.0MM STRAIGHT ROUTER

## (undated) DEVICE — SYR CONTRL LUERLOK 10CC

## (undated) DEVICE — GLV SURG BIOGEL LTX PF 7

## (undated) DEVICE — DIFFUSER: Brand: CORE, MAESTRO

## (undated) DEVICE — INSTRUMENT 7756010 2.4MM DRILL BIT

## (undated) DEVICE — SUT SILK 2/0 FS BLK 18IN 685G

## (undated) DEVICE — DRN JP FLT NO TROC SIL FUL/PERF 7MM

## (undated) DEVICE — JACKSON-PRATT 100CC BULB RESERVOIR: Brand: CARDINAL HEALTH

## (undated) DEVICE — PK NEURO SPINE 40

## (undated) DEVICE — 3M™ STERI-STRIP™ REINFORCED ADHESIVE SKIN CLOSURES, R1547, 1/2 IN X 4 IN (12 MM X 100 MM), 6 STRIPS/ENVELOPE: Brand: 3M™ STERI-STRIP™

## (undated) DEVICE — NDL SPINE 22G 31/2IN BLK

## (undated) DEVICE — APPL CHLORAPREP W/TINT 10.5ML PERC STRL

## (undated) DEVICE — PK ATS CUST W CARDIOTOMY RESEVOIR

## (undated) DEVICE — ENCORE® LATEX ORTHO SIZE 8, STERILE LATEX POWDER-FREE SURGICAL GLOVE: Brand: ENCORE

## (undated) DEVICE — STPLR SKIN VISISTAT WD 35CT

## (undated) DEVICE — GOWN,NON-REINFORCED,SIRUS,SET IN SLV,XXL: Brand: MEDLINE